# Patient Record
Sex: MALE | Race: WHITE | NOT HISPANIC OR LATINO | Employment: FULL TIME | URBAN - METROPOLITAN AREA
[De-identification: names, ages, dates, MRNs, and addresses within clinical notes are randomized per-mention and may not be internally consistent; named-entity substitution may affect disease eponyms.]

---

## 2017-06-06 ENCOUNTER — OFFICE VISIT (OUTPATIENT)
Dept: FAMILY MEDICINE | Facility: CLINIC | Age: 47
End: 2017-06-06
Payer: COMMERCIAL

## 2017-06-06 VITALS
WEIGHT: 187 LBS | SYSTOLIC BLOOD PRESSURE: 137 MMHG | DIASTOLIC BLOOD PRESSURE: 88 MMHG | HEART RATE: 64 BPM | BODY MASS INDEX: 27.7 KG/M2 | HEIGHT: 69 IN | TEMPERATURE: 99.7 F | OXYGEN SATURATION: 96 %

## 2017-06-06 DIAGNOSIS — Z00.01 ENCOUNTER FOR ROUTINE ADULT HEALTH EXAMINATION WITH ABNORMAL FINDINGS: Primary | ICD-10-CM

## 2017-06-06 DIAGNOSIS — Z13.1 SCREENING FOR DIABETES MELLITUS: ICD-10-CM

## 2017-06-06 DIAGNOSIS — Z13.6 CARDIOVASCULAR SCREENING; LDL GOAL LESS THAN 160: ICD-10-CM

## 2017-06-06 DIAGNOSIS — J01.90 ACUTE SINUSITIS WITH SYMPTOMS GREATER THAN 10 DAYS: ICD-10-CM

## 2017-06-06 LAB
CHOLEST SERPL-MCNC: 159 MG/DL
GLUCOSE SERPL-MCNC: 97 MG/DL (ref 70–99)
HDLC SERPL-MCNC: 45 MG/DL
LDLC SERPL CALC-MCNC: 61 MG/DL
NONHDLC SERPL-MCNC: 114 MG/DL
TRIGL SERPL-MCNC: 263 MG/DL

## 2017-06-06 PROCEDURE — 82947 ASSAY GLUCOSE BLOOD QUANT: CPT | Performed by: FAMILY MEDICINE

## 2017-06-06 PROCEDURE — 36415 COLL VENOUS BLD VENIPUNCTURE: CPT | Performed by: FAMILY MEDICINE

## 2017-06-06 PROCEDURE — 99213 OFFICE O/P EST LOW 20 MIN: CPT | Mod: 25 | Performed by: FAMILY MEDICINE

## 2017-06-06 PROCEDURE — 99386 PREV VISIT NEW AGE 40-64: CPT | Performed by: FAMILY MEDICINE

## 2017-06-06 PROCEDURE — 80061 LIPID PANEL: CPT | Performed by: FAMILY MEDICINE

## 2017-06-06 RX ORDER — AMOXICILLIN 500 MG/1
500 CAPSULE ORAL 3 TIMES DAILY
Qty: 30 CAPSULE | Refills: 0 | Status: SHIPPED | OUTPATIENT
Start: 2017-06-06 | End: 2017-06-16

## 2017-06-06 RX ORDER — AMOXICILLIN 500 MG/1
500 CAPSULE ORAL 3 TIMES DAILY
Qty: 30 CAPSULE | Refills: 0 | Status: SHIPPED | OUTPATIENT
Start: 2017-06-06 | End: 2017-06-06

## 2017-06-06 NOTE — PROGRESS NOTES
SUBJECTIVE:     CC: Pedrito Jenkins is an 46 year old male who presents for preventative health visit.     Healthy Habits:    Do you get at least three servings of calcium containing foods daily (dairy, green leafy vegetables, etc.)? yes    Amount of exercise or daily activities, outside of work: 4-5 day(s) per week    Problems taking medications regularly No    Medication side effects: No    Have you had an eye exam in the past two years? no    Do you see a dentist twice per year? yes    Do you have sleep apnea, excessive snoring or daytime drowsiness? yes      RESPIRATORY SYMPTOMS      Duration: 1 month    Description  cough    Severity: moderate    Accompanying signs and symptoms: headache and nausea    History (predisposing factors):  none    Precipitating or alleviating factors: None    Therapies tried and outcome:  Cough med otc, no improvement     Cough is dry. Started with cold symptoms about 1 month. After a week from onset, cold symptoms had resolved, however coughing had not. Also reports mild nausea with coughing.     For exercise, patient runs 20-25 miles a week.    Today's PHQ-2 Score:   PHQ-2 ( 1999 Pfizer) 6/6/2017 10/18/2013   Q1: Little interest or pleasure in doing things 0 0   Q2: Feeling down, depressed or hopeless 0 0   PHQ-2 Score 0 0       Abuse: Current or Past(Physical, Sexual or Emotional)- No  Do you feel safe in your environment - Yes    Social History   Substance Use Topics     Smoking status: Never Smoker     Smokeless tobacco: Never Used     Alcohol use 1.2 - 1.8 oz/week     2 - 3 Standard drinks or equivalent per week     The patient does not drink >3 drinks per day nor >7 drinks per week.    Last PSA: No results found for: PSA    No results for input(s): CHOL, HDL, LDL, TRIG, CHOLHDLRATIO, NHDL in the last 41450 hours.      ROS:  C: NEGATIVE for fever, chills, change in weight  I: NEGATIVE for worrisome rashes, moles or lesions  E: NEGATIVE for vision changes or irritation  ENT: See  "above.  R: NEGATIVE for significant SOB  CV: NEGATIVE for chest pain, palpitations or peripheral edema  GI: NEGATIVE for nausea, abdominal pain, heartburn, or change in bowel habits   male: negative for dysuria, hematuria, decreased urinary stream, erectile dysfunction, urethral discharge  M: NEGATIVE for significant arthralgias or myalgia  N: NEGATIVE for weakness, dizziness or paresthesias  P: NEGATIVE for changes in mood or affect    Problem list, Medication list, Allergies, and Medical/Social/Surgical histories reviewed in [facility] and updated as appropriate.    This document serves as a record of the services and decisions personally performed and made by Derrick Carter MD. It was created on his behalf by Radha Whitney, a trained medical scribe. The creation of this document is based the provider's statements to the medical scribe.    Scribe Radha Whitney 3:03 PM 6/6/2017    OBJECTIVE:     /88 (BP Location: Left arm, Patient Position: Chair, Cuff Size: Adult Regular)  Pulse 64  Temp 99.7  F (37.6  C) (Oral)  Ht 1.74 m (5' 8.5\")  Wt 84.8 kg (187 lb)  SpO2 96%  BMI 28.02 kg/m2  EXAM:  GENERAL: healthy, alert and no distress, coughing frequently.  EYES: Eyes grossly normal to inspection, PERRL and conjunctivae and sclerae normal  HENT: ear canals and TM's normal, nose and mouth without ulcers or lesions  NECK: no adenopathy, no asymmetry, masses, or scars and thyroid normal to palpation  RESP: lungs clear to auscultation - no rales, rhonchi or wheezes  CV: regular rate and rhythm, normal S1 S2, no S3 or S4, no murmur, click or rub, no peripheral edema and peripheral pulses strong  ABDOMEN: soft, nontender, no hepatosplenomegaly, no masses and bowel sounds normal   (male): normal male genitalia without lesions or urethral discharge, no hernia  MS: no gross musculoskeletal defects noted, no edema  SKIN: no suspicious lesions or rashes  NEURO: Normal strength and tone, mentation intact and speech " "normal  PSYCH: mentation appears normal, affect normal/bright    ASSESSMENT/PLAN:     (Z00.01) Encounter for routine adult health examination with abnormal findings  (primary encounter diagnosis)  Comment: Negative screening exam; up-to-date on preventive services.   Plan: GLUCOSE, Lipid panel reflex to direct LDL          (J01.90) Acute sinusitis with symptoms greater than 10 days  Comment: Based on history  Plan: amoxicillin (AMOXIL) 500 MG capsule,         Follow if symptoms fail to resolve within 14 days.    (Z13.6) CARDIOVASCULAR SCREENING; LDL GOAL LESS THAN 160  Comment: Non-fasting  Plan: Lipid panel reflex to direct LDL        Follow up as needed     (Z13.1) Screening for diabetes mellitus  Comment:   Plan: GLUCOSE              COUNSELING:  Reviewed preventive health counseling, as reflected in patient instructions       reports that he has never smoked. He has never used smokeless tobacco.    Estimated body mass index is 28.02 kg/(m^2) as calculated from the following:    Height as of this encounter: 1.74 m (5' 8.5\").    Weight as of this encounter: 84.8 kg (187 lb).   Weight management plan: Exercise routine reviewed and approved (runner).    Counseling Resources:  ATP IV Guidelines  Pooled Cohorts Equation Calculator  FRAX Risk Assessment  ICSI Preventive Guidelines  Dietary Guidelines for Americans, 2010  USDA's MyPlate  ASA Prophylaxis  Lung CA Screening    The information in this document, created by a scribe for me, accurately reflects the services I personally performed and the decisions made by me. I have reviewed and approved this document for accuracy.      Derrick Carter MD  [facility]  "

## 2017-06-06 NOTE — NURSING NOTE
".  Chief Complaint   Patient presents with     Physical       Initial /88 (BP Location: Left arm, Patient Position: Chair, Cuff Size: Adult Regular)  Pulse 64  Temp 99.7  F (37.6  C) (Oral)  Ht 5' 8.5\" (1.74 m)  Wt 187 lb (84.8 kg)  SpO2 96%  BMI 28.02 kg/m2 Estimated body mass index is 28.02 kg/(m^2) as calculated from the following:    Height as of this encounter: 5' 8.5\" (1.74 m).    Weight as of this encounter: 187 lb (84.8 kg).  Medication Reconciliation: complete     Kris Garibay MA      "

## 2017-06-06 NOTE — PATIENT INSTRUCTIONS
Preventive Health Recommendations  Male Ages 40 to 49    Yearly exam:             See your health care provider every year in order to  o   Review health changes.   o   Discuss preventive care.    o   Review your medicines if your doctor has prescribed any.    You should be tested each year for STDs (sexually transmitted diseases) if you re at risk.     Have a cholesterol test every 5 years.     Have a colonoscopy (test for colon cancer) if someone in your family has had colon cancer or polyps before age 50.     After age 45, have a diabetes test (fasting glucose). If you are at risk for diabetes, you should have this test every 3 years.      Talk with your health care provider about whether or not a prostate cancer screening test (PSA) is right for you.    Shots: Get a flu shot each year. Get a tetanus shot every 10 years.     Nutrition:    Eat at least 5 servings of fruits and vegetables daily.     Eat whole-grain bread, whole-wheat pasta and brown rice instead of white grains and rice.     Talk to your provider about Calcium and Vitamin D.     Lifestyle    Exercise for at least 150 minutes a week (30 minutes a day, 5 days a week). This will help you control your weight and prevent disease.     Limit alcohol to one drink per day.     No smoking.     Wear sunscreen to prevent skin cancer.     See your dentist every six months for an exam and cleaning.      This summary includes the important diagnoses, test, medications and other important parts of your medical history.  Below are a few good we sites you can use to learn more about these.     Www.comment.com.org : Up to date and easily searchable information on multiple topics.  Www.comment.com.org/Pharmacy/c_539084.asp : [facility] $4.99 medications  Www.medlineplus.gov : medication info, interactive tutorials, watch real surgeries online  Www.familydoctor.org : good info from the Academy of Family Physicians  Www.mayoclinic.com : good info from the [facility]  Www.cdc.gov : public health info, travel advisories, epidemics (H1N1)  Www.aap.org : children's health info, normal development, vaccinations  Www.health.[facility].[place].us : [place] dept of heat, public health issues in [place], N1N1    Based on your medical history and these are the current health maintenance or preventive care services that you are due for (some may have been done at this visit:)  Health Maintenance Due   Topic Date Due     LIPID SCREEN Q5 YR MALE (SYSTEM ASSIGNED)  10/24/2013     =================================================================================  Normal Values   Blood pressure  <140/90 for most adults    <130/80 for some chronic diseases (ask your care team about yours)    BMI (body mass index)  18.5-25 kg/m2 (based on height and weight)     Thank you for visiting [facility]    Normal or non-critical lab and imaging results will be communicated to you by MyChart, letter or phone within 7 days.  If you do not hear from us within 10 days, please call the clinic. If you have a critical or abnormal lab result, we will notify you by phone as soon as possible.     If you have any questions regarding your visit please contact:     Team Comfort:   Clinic Hours Telephone Number   Dr. Derrick Moralez   [facility]   7am-5pm  Monday - Friday [phone]  [facility]   Pharmacy 8am-8pm Monday-Thursday      8am-6pm Friday  9am-5pm Saturday-Sunday [phone]   Urgent Care 11am-8pm Monday-Friday        9am-5pm Saturday-Sunday [phone]     After hours, weekend or if you need to make an appointment with your primary provider please call [phone].   After Hours nurse advise: call [facility]: [phone]    Medication Refills:  Call your pharmacy and they will forward the refill to us. Please allow 3 business days for your refills to be completed.    Use Sendoid (secure email communication and  access to your chart) to send your primary care provider a message or make an appointment. Ask someone on your Team how to sign up for Keepy. To log on to [employer] or for more information in NanoInk please visit the website at [email or web address].  As of October 8, 2013, all password changes, disabled accounts, or ID changes in Keepy/MyHealth will be done by our Access Services Department.   If you need help with your account or password, call: [phone]. Clinic staff no longer has the ability to change passwords.

## 2017-06-06 NOTE — MR AVS SNAPSHOT
After Visit Summary   6/6/2017    Pedrito Jenkins    MRN: [id number]           Patient Information     Date Of Birth          1970        Visit Information        Provider Department      6/6/2017 2:00 PM Derrick Carter MD [facility]        Today's Diagnoses     Encounter for routine adult health examination with abnormal findings    -  1    Acute sinusitis with symptoms greater than 10 days        CARDIOVASCULAR SCREENING; LDL GOAL LESS THAN 160        Screening for diabetes mellitus          Care Instructions      Preventive Health Recommendations  Male Ages 40 to 49    Yearly exam:             See your health care provider every year in order to  o   Review health changes.   o   Discuss preventive care.    o   Review your medicines if your doctor has prescribed any.    You should be tested each year for STDs (sexually transmitted diseases) if you re at risk.     Have a cholesterol test every 5 years.     Have a colonoscopy (test for colon cancer) if someone in your family has had colon cancer or polyps before age 50.     After age 45, have a diabetes test (fasting glucose). If you are at risk for diabetes, you should have this test every 3 years.      Talk with your health care provider about whether or not a prostate cancer screening test (PSA) is right for you.    Shots: Get a flu shot each year. Get a tetanus shot every 10 years.     Nutrition:    Eat at least 5 servings of fruits and vegetables daily.     Eat whole-grain bread, whole-wheat pasta and brown rice instead of white grains and rice.     Talk to your provider about Calcium and Vitamin D.     Lifestyle    Exercise for at least 150 minutes a week (30 minutes a day, 5 days a week). This will help you control your weight and prevent disease.     Limit alcohol to one drink per day.     No smoking.     Wear sunscreen to prevent skin cancer.     See your dentist every six months for an exam and cleaning.      This  summary includes the important diagnoses, test, medications and other important parts of your medical history.  Below are a few good we sites you can use to learn more about these.     Www.BonitaSoft.org : Up to date and easily searchable information on multiple topics.  Www.BonitaSoft.org/Pharmacy/c_539084.asp : [facility] $4.99 medications  Www.ZoomForth.gov : medication info, interactive tutorials, watch real surgeries online  Www.familydoctor.org : good info from the Academy of Family Physicians  Www.mayoAginovainic.com : good info from the [facility]  Www.cdc.gov : public health info, travel advisories, epidemics (H1N1)  Www.aap.org : children's health info, normal development, vaccinations  Www.health.[facility].[place].us : [place] dept of heatl, public health issues in [place], N1N1    Based on your medical history and these are the current health maintenance or preventive care services that you are due for (some may have been done at this visit:)  Health Maintenance Due   Topic Date Due     LIPID SCREEN Q5 YR MALE (SYSTEM ASSIGNED)  10/24/2013     =================================================================================  Normal Values   Blood pressure  <140/90 for most adults    <130/80 for some chronic diseases (ask your care team about yours)    BMI (body mass index)  18.5-25 kg/m2 (based on height and weight)     Thank you for visiting [facility]    Normal or non-critical lab and imaging results will be communicated to you by MyChart, letter or phone within 7 days.  If you do not hear from us within 10 days, please call the clinic. If you have a critical or abnormal lab result, we will notify you by phone as soon as possible.     If you have any questions regarding your visit please contact:     Team Comfort:   Clinic Hours Telephone Number   Dr. Derrick Turner   7am-5pm  Monday - Friday [phone]  [facility]    Pharmacy 8am-8pm Monday-Thursday      8am-6pm Friday  9am-5pm Saturday-Sunday [phone]   Urgent Care 11am-8pm Monday-Friday        9am-5pm Saturday-Sunday [phone]     After hours, weekend or if you need to make an appointment with your primary provider please call [phone].   After Hours nurse advise: call [facility]: [phone]    Medication Refills:  Call your pharmacy and they will forward the refill to us. Please allow 3 business days for your refills to be completed.    Use iCrumz (secure email communication and access to your chart) to send your primary care provider a message or make an appointment. Ask someone on your Team how to sign up for iCrumz. To log on to [employer] or for more information in [employer] please visit the website at [email or web address].  As of October 8, 2013, all password changes, disabled accounts, or ID changes in iCrumz/MyHealth will be done by our Access Services Department.   If you need help with your account or password, call: [phone]. Clinic staff no longer has the ability to change passwords.             Follow-ups after your visit        Follow-up notes from your care team     Return if symptoms worsen or fail to resolve by 6/20/2017.      Your next 10 appointments already scheduled     Jun 29, 2017  4:40 PM CDT   PHYSICAL with Derrick Carter MD   [facility]    [address] [place] [id number] [phone]              Who to contact     If you have questions or need follow up information about today's clinic visit or your schedule please contact [facility] directly at [phone].  Normal or non-critical lab and imaging results will be communicated to you by MyChart, letter or phone within 4 business days after the clinic has received the results. If you do not hear from us within 7 days, please contact the clinic through iCrumz or  "phone. If you have a critical or abnormal lab result, we will notify you by phone as soon as possible.  Submit refill requests through InnerRewards or call your pharmacy and they will forward the refill request to us. Please allow 3 business days for your refill to be completed.          Additional Information About Your Visit        [employer] Information     InnerRewards lets you send messages to your doctor, view your test results, renew your prescriptions, schedule appointments and more. To sign up, go to [email or web address] . Click on \"Log in\" on the left side of the screen, which will take you to the Welcome page. Then click on \"Sign up Now\" on the right side of the page.     You will be asked to enter the access code listed below, as well as some personal information. Please follow the directions to create your username and password.     Your access code is: [email or web address]  Expires: 2017  2:54 PM     Your access code will  in 90 days. If you need help or a new code, please call your [facility] clinic or [phone].        Care EveryWhere ID     This is your Care EveryWhere ID. This could be used by other organizations to access your [facility] medical records  [id number]        Your Vitals Were     Pulse Temperature Height Pulse Oximetry BMI (Body Mass Index)       64 99.7  F (37.6  C) (Oral) 5' 8.5\" (1.74 m) 96% 28.02 kg/m2        Blood Pressure from Last 3 Encounters:   17 137/88   10/18/13 136/86   12 124/77    Weight from Last 3 Encounters:   17 187 lb (84.8 kg)   10/18/13 182 lb 4 oz (82.7 kg)   12 180 lb (81.6 kg)              We Performed the Following     GLUCOSE     Lipid panel reflex to direct LDL          Today's Medication Changes          These changes are accurate as of: 17  2:55 PM.  If you have any questions, ask your nurse or doctor.               Start taking these medicines.        Dose/Directions    amoxicillin 500 MG capsule   Commonly known as:  AMOXIL   Used " for:  Acute sinusitis with symptoms greater than 10 days   Started by:  Derrick Carter MD        Dose:  500 mg   Take 1 capsule (500 mg) by mouth 3 times daily for 10 days for sinus infection   Quantity:  30 capsule   Refills:  0            Where to get your medicines      These medications were sent to [facility] [place], [place] - [address]  [address], [facility] [address]     Phone:  [phone]     amoxicillin 500 MG capsule                Primary Care Provider Office Phone # Fax #    Angela Porshamadiha Jean-Baptiste [phone]       [facility] [address]  [facility] [address]        Thank you!     Thank you for choosing [facility]  for your care. Our goal is always to provide you with excellent care. Hearing back from our patients is one way we can continue to improve our services. Please take a few minutes to complete the written survey that you may receive in the mail after your visit with us. Thank you!             Your Updated Medication List - Protect others around you: Learn how to safely use, store and throw away your medicines at www.disposemymeds.org.          This list is accurate as of: 6/6/17  2:55 PM.  Always use your most recent med list.                   Brand Name Dispense Instructions for use    amoxicillin 500 MG capsule    AMOXIL    30 capsule    Take 1 capsule (500 mg) by mouth 3 times daily for 10 days for sinus infection

## 2023-01-27 ENCOUNTER — TELEPHONE (OUTPATIENT)
Dept: FAMILY MEDICINE | Facility: CLINIC | Age: 53
End: 2023-01-27

## 2023-01-27 ENCOUNTER — OFFICE VISIT (OUTPATIENT)
Dept: FAMILY MEDICINE | Facility: CLINIC | Age: 53
End: 2023-01-27
Payer: COMMERCIAL

## 2023-01-27 VITALS
HEIGHT: 69 IN | SYSTOLIC BLOOD PRESSURE: 132 MMHG | BODY MASS INDEX: 28.88 KG/M2 | DIASTOLIC BLOOD PRESSURE: 74 MMHG | TEMPERATURE: 98.9 F | OXYGEN SATURATION: 97 % | HEART RATE: 59 BPM | RESPIRATION RATE: 16 BRPM | WEIGHT: 195 LBS

## 2023-01-27 DIAGNOSIS — Z12.11 SCREEN FOR COLON CANCER: ICD-10-CM

## 2023-01-27 DIAGNOSIS — Z28.21 INFLUENZA VACCINATION DECLINED: ICD-10-CM

## 2023-01-27 DIAGNOSIS — Z12.5 SCREENING FOR PROSTATE CANCER: ICD-10-CM

## 2023-01-27 DIAGNOSIS — H61.23 BILATERAL IMPACTED CERUMEN: ICD-10-CM

## 2023-01-27 DIAGNOSIS — R35.0 URINARY FREQUENCY: Primary | ICD-10-CM

## 2023-01-27 DIAGNOSIS — Z00.00 ROUTINE GENERAL MEDICAL EXAMINATION AT A HEALTH CARE FACILITY: Primary | ICD-10-CM

## 2023-01-27 DIAGNOSIS — Z28.21 HERPES ZOSTER VACCINATION DECLINED: ICD-10-CM

## 2023-01-27 DIAGNOSIS — Z13.1 SCREENING FOR DIABETES MELLITUS: ICD-10-CM

## 2023-01-27 DIAGNOSIS — R35.0 URINARY FREQUENCY: ICD-10-CM

## 2023-01-27 DIAGNOSIS — E66.3 OVERWEIGHT (BMI 25.0-29.9): ICD-10-CM

## 2023-01-27 DIAGNOSIS — Z28.21 COVID-19 VACCINATION DECLINED: ICD-10-CM

## 2023-01-27 DIAGNOSIS — Z28.21 TETANUS, DIPHTHERIA, AND ACELLULAR PERTUSSIS (TDAP) VACCINATION DECLINED: ICD-10-CM

## 2023-01-27 DIAGNOSIS — Z13.220 SCREENING FOR HYPERLIPIDEMIA: ICD-10-CM

## 2023-01-27 LAB
ALBUMIN UR-MCNC: NEGATIVE MG/DL
ANION GAP SERPL CALCULATED.3IONS-SCNC: 7 MMOL/L (ref 3–14)
APPEARANCE UR: CLEAR
BACTERIA #/AREA URNS HPF: ABNORMAL /HPF
BILIRUB UR QL STRIP: NEGATIVE
BUN SERPL-MCNC: 18 MG/DL (ref 7–30)
CALCIUM SERPL-MCNC: 9.4 MG/DL (ref 8.5–10.1)
CHLORIDE BLD-SCNC: 107 MMOL/L (ref 94–109)
CHOLEST SERPL-MCNC: 194 MG/DL
CO2 SERPL-SCNC: 25 MMOL/L (ref 20–32)
COLOR UR AUTO: YELLOW
CREAT SERPL-MCNC: 0.96 MG/DL (ref 0.66–1.25)
FASTING STATUS PATIENT QL REPORTED: YES
GFR SERPL CREATININE-BSD FRML MDRD: >90 ML/MIN/1.73M2
GLUCOSE BLD-MCNC: 105 MG/DL (ref 70–99)
GLUCOSE UR STRIP-MCNC: NEGATIVE MG/DL
HDLC SERPL-MCNC: 51 MG/DL
HGB UR QL STRIP: NEGATIVE
KETONES UR STRIP-MCNC: NEGATIVE MG/DL
LDLC SERPL CALC-MCNC: 120 MG/DL
LEUKOCYTE ESTERASE UR QL STRIP: NEGATIVE
MUCOUS THREADS #/AREA URNS LPF: PRESENT /LPF
NITRATE UR QL: NEGATIVE
NONHDLC SERPL-MCNC: 143 MG/DL
PH UR STRIP: 6 [PH] (ref 5–7)
POTASSIUM BLD-SCNC: 4.2 MMOL/L (ref 3.4–5.3)
PSA SERPL-MCNC: 0.75 UG/L (ref 0–4)
RBC #/AREA URNS AUTO: ABNORMAL /HPF
SODIUM SERPL-SCNC: 139 MMOL/L (ref 133–144)
SP GR UR STRIP: 1.02 (ref 1–1.03)
SQUAMOUS #/AREA URNS AUTO: ABNORMAL /LPF
TRIGL SERPL-MCNC: 113 MG/DL
UROBILINOGEN UR STRIP-ACNC: 0.2 E.U./DL
WBC #/AREA URNS AUTO: ABNORMAL /HPF

## 2023-01-27 PROCEDURE — 81001 URINALYSIS AUTO W/SCOPE: CPT | Performed by: FAMILY MEDICINE

## 2023-01-27 PROCEDURE — 69209 REMOVE IMPACTED EAR WAX UNI: CPT | Mod: 50 | Performed by: FAMILY MEDICINE

## 2023-01-27 PROCEDURE — 99386 PREV VISIT NEW AGE 40-64: CPT | Mod: 25 | Performed by: FAMILY MEDICINE

## 2023-01-27 PROCEDURE — G0103 PSA SCREENING: HCPCS | Performed by: FAMILY MEDICINE

## 2023-01-27 PROCEDURE — 87086 URINE CULTURE/COLONY COUNT: CPT | Performed by: FAMILY MEDICINE

## 2023-01-27 PROCEDURE — 36415 COLL VENOUS BLD VENIPUNCTURE: CPT | Performed by: FAMILY MEDICINE

## 2023-01-27 PROCEDURE — 99213 OFFICE O/P EST LOW 20 MIN: CPT | Mod: 25 | Performed by: FAMILY MEDICINE

## 2023-01-27 PROCEDURE — 80061 LIPID PANEL: CPT | Performed by: FAMILY MEDICINE

## 2023-01-27 PROCEDURE — 80048 BASIC METABOLIC PNL TOTAL CA: CPT | Performed by: FAMILY MEDICINE

## 2023-01-27 ASSESSMENT — ENCOUNTER SYMPTOMS
NAUSEA: 0
PALPITATIONS: 0
CONSTIPATION: 0
FEVER: 0
FREQUENCY: 0
ABDOMINAL PAIN: 0
EYE PAIN: 0
NERVOUS/ANXIOUS: 0
HEMATOCHEZIA: 0
WEAKNESS: 0
DIZZINESS: 0
HEMATURIA: 0
CHILLS: 0
DYSURIA: 0
HEADACHES: 0
JOINT SWELLING: 0
HEARTBURN: 0
MYALGIAS: 0
SHORTNESS OF BREATH: 0
PARESTHESIAS: 0
ARTHRALGIAS: 0
DIARRHEA: 0
SORE THROAT: 0
COUGH: 0

## 2023-01-27 ASSESSMENT — PAIN SCALES - GENERAL: PAINLEVEL: NO PAIN (0)

## 2023-01-27 NOTE — RESULT ENCOUNTER NOTE
"Please inform of results if patient has not viewed in [email or web address] within 3 business days.    PSA - Your Prostate cancer screening lab results were normal.    Lipids - Your \"bad\" cholesterol was elevated. This can be improved with diet and exercise. All animal based foods such as meat, dairy, and eggs contain cholesterol. All plant based foods such as fruits, nuts, and vegetables do not.    You do not need a medication for this at this time.    BMP - Your blood sugar was 105. Your kidney function and electrolytes were normal.    Please call the clinic with any questions you may have.     Have a great day,    Dr. Saucedo    The 10-year ASCVD risk score (Stephy CABALLERO, et al., 2019) is: 4.2%    Values used to calculate the score:      Age: 52 years      Sex: Male      Is Non- : No      Diabetic: No      Tobacco smoker: No      Systolic Blood Pressure: 132 mmHg      Is BP treated: No      HDL Cholesterol: 51 mg/dL      Total Cholesterol: 194 mg/dL"

## 2023-01-27 NOTE — TELEPHONE ENCOUNTER
----- Message from Loulou Reid sent at 1/27/2023  9:46 AM CST -----    ----- Message -----  From: Migue Martinez MD  Sent: 1/27/2023   9:37 AM CST  To: [facility] Patient Care Team    Please inform of results if patient has not viewed in scPharmaceuticalst within 3 business days.    There were a few bacteria present on your urine, test. However, no other signs of infection. I will send this for a culture to determine if this could be a actual UTI or a contaminant from your skin.    Please call the clinic with any questions you may have.     Have a great day,    Dr. Saucedo

## 2023-01-27 NOTE — PROGRESS NOTES
SUBJECTIVE:   CC: Pedrito is an 52 year old who presents for preventative health visit.     Patient has been advised of split billing requirements and indicates understanding: Yes  Healthy Habits:     Getting at least 3 servings of Calcium per day:  Yes    Bi-annual eye exam:  NO    Dental care twice a year:  Yes    Sleep apnea or symptoms of sleep apnea:  None    Diet:  Regular (no restrictions)    Frequency of exercise:  2-3 days/week    Duration of exercise:  15-30 minutes    Taking medications regularly:  Yes    Medication side effects:  Not applicable    PHQ-2 Total Score: 0    Additional concerns today:  No    Having some frequency at night and during the day. Has to start planning around meetings etc. No dysuria, hematuria, abdominal pain, etc.    No personal or family history of prostate cancers.    Today's PHQ-2 Score:   PHQ-2 ( 1999 [employer]) 1/27/2023   Q1: Little interest or pleasure in doing things 0   Q2: Feeling down, depressed or hopeless 0   PHQ-2 Score 0   Q1: Little interest or pleasure in doing things Not at all   Q2: Feeling down, depressed or hopeless Not at all   PHQ-2 Score 0     Have you ever done Advance Care Planning? (For example, a Health Directive, POLST, or a discussion with a medical provider or your loved ones about your wishes): No, advance care planning information given to patient to review.  Advanced care planning was discussed at today's visit.    Social History     Tobacco Use     Smoking status: Never     Smokeless tobacco: Never   Substance Use Topics     Alcohol use: Yes     Alcohol/week: 2.0 - 3.0 standard drinks     Types: 2 - 3 Standard drinks or equivalent per week     Alcohol Use 1/27/2023   Prescreen: >3 drinks/day or >7 drinks/week? No   Prescreen: >3 drinks/day or >7 drinks/week? -     Last PSA: No results found for: PSA    Reviewed orders with patient. Reviewed health maintenance and updated orders accordingly - Yes  Lab work is in process  BP Readings from Last 3  Encounters:   01/27/23 132/74   06/06/17 137/88   10/18/13 136/86    Wt Readings from Last 3 Encounters:   01/27/23 88.5 kg (195 lb)   06/06/17 84.8 kg (187 lb)   10/18/13 82.7 kg (182 lb 4 oz)          Patient Active Problem List   Diagnosis     CARDIOVASCULAR SCREENING; LDL GOAL LESS THAN 160     Past Surgical History:   Procedure Laterality Date     VASECTOMY      Dr. Narayanan       Social History     Tobacco Use     Smoking status: Never     Smokeless tobacco: Never   Substance Use Topics     Alcohol use: Yes     Alcohol/week: 2.0 - 3.0 standard drinks     Types: 2 - 3 Standard drinks or equivalent per week     Family History   Problem Relation Age of Onset     Parkinsonism Father      Hypertension No family hx of      Coronary Artery Disease No family hx of      Cerebrovascular Disease No family hx of      Diabetes No family hx of      Cancer No family hx of          No current outpatient medications on file.     No Known Allergies    Reviewed and updated as needed this visit by clinical staff   Tobacco  Allergies  Meds  Problems  Med Hx  Surg Hx  Fam Hx          Reviewed and updated as needed this visit by Provider   Tobacco  Allergies  Meds  Problems  Med Hx  Surg Hx  Fam Hx       Social history reviewed  Past Medical History:   Diagnosis Date     NO ACTIVE PROBLEMS       Past Surgical History:   Procedure Laterality Date     VASECTOMY      Dr. Narayanan       Review of Systems   Constitutional: Negative for chills and fever.   HENT: Negative for congestion, ear pain, hearing loss and sore throat.    Eyes: Negative for pain and visual disturbance.   Respiratory: Negative for cough and shortness of breath.    Cardiovascular: Negative for chest pain, palpitations and peripheral edema.   Gastrointestinal: Negative for abdominal pain, constipation, diarrhea, heartburn, hematochezia and nausea.   Genitourinary: Positive for urgency. Negative for dysuria, frequency, genital sores, hematuria, impotence  "and penile discharge.   Musculoskeletal: Negative for arthralgias, joint swelling and myalgias.   Skin: Negative for rash.   Neurological: Negative for dizziness, weakness, headaches and paresthesias.   Psychiatric/Behavioral: Negative for mood changes. The patient is not nervous/anxious.      OBJECTIVE:   /74   Pulse 59   Temp 98.9  F (37.2  C) (Temporal)   Resp 16   Ht 1.744 m (5' 8.66\")   Wt 88.5 kg (195 lb)   SpO2 97%   BMI 29.08 kg/m      Physical Exam  GENERAL: healthy, alert and no distress  EYES: Eyes grossly normal to inspection, PERRL and conjunctivae and sclerae normal  HENT: ear canals and TM's normal, nose and mouth without ulcers or lesions  NECK: no adenopathy, no asymmetry, masses, or scars and thyroid normal to palpation  RESP: lungs clear to auscultation - no rales, rhonchi or wheezes  CV: regular rate and rhythm, normal S1 S2, no S3 or S4, no murmur, click or rub, no peripheral edema and peripheral pulses strong  ABDOMEN: soft, nontender, no hepatosplenomegaly, no masses and bowel sounds normal  MS: no gross musculoskeletal defects noted, no edema  SKIN: no suspicious lesions or rashes  NEURO: Normal strength and tone, mentation intact and speech normal  PSYCH: mentation appears normal, affect normal/bright    Labs: pending    ASSESSMENT/PLAN:   1. Routine general medical examination at a health care facility: Discussed personal health and safety. Routine screenings as below. Appropriate anticipatory guidance, vaccinations, and health screening recommendations delivered according to the USPSTF and other appropriate society guidelines.  Patient understands and is agreeable with the plan ordered below.  - REVIEW OF HEALTH MAINTENANCE PROTOCOL ORDERS  - Colonoscopy Screening  Referral; Future  - PRIMARY CARE FOLLOW-UP SCHEDULING; Future  - Lipid panel reflex to direct LDL Fasting; Future  - Basic metabolic panel; Future  - PSA, screen; Future    2. Screening for " hyperlipidemia  - Lipid panel reflex to direct LDL Fasting; Future    3. Screening for diabetes mellitus  - Basic metabolic panel; Future    4. Screen for colon cancer  - Colonoscopy Screening  Referral; Future    5. Overweight (BMI 25.0-29.9): Encourage diet and exercise changes for overall health improvement.    6. Urinary frequency: Work up as below. Not interested in medication at this time.  - UA with Microscopic reflex to Culture - lab collect; Future  - PSA, screen; Future    7. Screening for prostate cancer: Patient elects to undergo PSA screening after informed decision making process. This discussion with the patient included reviewing the benefits of testing such as: Ability to easily add on to existing blood work, screening for a common cancer in men which has treatment options and otherwise may have been silent, and possibility for early detection to prevent morbidity from future metastasis and/or death. Additionally, risks were discussed including possibility of false positive testing (leading to anxiety and further unnecessary testing/biopsies), possibility of over treating a cancer that may not affect a man in his lifetime, and the side effects of the current treatment options for prosate cancer (urinary incontinence, ED, etc).  - PSA, screen; Future    8. Bilateral impacted cerumen: Noted on exam and flushed by MA staff.  - AR REMOVAL IMPACTED CERUMEN IRRIGATION/LVG UNILAT    9. Tetanus, diphtheria, and acellular pertussis (Tdap) vaccination declined    10. Influenza vaccination declined    11. COVID-19 vaccination declined    12. Herpes zoster vaccination declined    COUNSELING:   Reviewed preventive health counseling, as reflected in patient instructions       Regular exercise       Healthy diet/nutrition       Vision screening       Hearing screening       Colorectal cancer screening       Prostate cancer screening    He reports that he has never smoked. He has never used smokeless  tobacco.    Migue Martinez MD  [facility]    Disclaimer: This note consists of symbols derived from keyboarding, dictation and/or voice recognition software. As a result, there may be errors in the script that have gone undetected. Please consider this when interpreting information found in this chart.

## 2023-01-28 LAB — BACTERIA UR CULT: NO GROWTH

## 2023-01-30 NOTE — RESULT ENCOUNTER NOTE
Please inform of results if patient has not viewed in [facility] within 3 business days.    Your urine culture was normal without signs of infection.    Please call the clinic with any questions you may have.     Have a great day,    Dr. Saucedo

## 2023-02-09 ENCOUNTER — TELEPHONE (OUTPATIENT)
Dept: GASTROENTEROLOGY | Facility: CLINIC | Age: 53
End: 2023-02-09
Payer: COMMERCIAL

## 2023-02-09 NOTE — TELEPHONE ENCOUNTER
Screening Questions  BLUE  KIND OF PREP RED  LOCATION [review exclusion criteria] GREEN  SEDATION TYPE        Y Are you active on mychart?       Migue Martinez MD    Ordering/Referring Provider?        [facility] What type of coverage do you have?      N Have you had a positive covid test in the last 14 days?     28.1 1. BMI  [BMI 40+ - review exclusion criteria]    Y  2. Are you able to give consent for your medical care? [IF NO,RN REVIEW]          N  3. Are you taking any prescription pain medications on a routine schedule   (ex narcotics: oxycodone, roxicodone, oxycontin,  and percocet)? [RN Review]          3a. EXTENDED PREP What kind of prescription?     N 4. Do you have any chemical dependencies such as alcohol, street drugs, or methadone?        **If yes 3- 5 , please schedule with MAC sedation.**          IF YES TO ANY 6 - 10 - HOSPITAL SETTING ONLY.     N 6.   Do you need assistance transferring?     N 7.   Have you had a heart or lung transplant?    N 8.   Are you currently on dialysis?   N 9.   Do you use daily home oxygen?   N 10. Do you take nitroglycerin?   10a.  If yes, how often?     11. [FEMALES]  N/A Are you currently pregnant?    11a.  If yes, how many weeks? [ Greater than 12 weeks, OR NEEDED]    N 12. Do you have Pulmonary Hypertension? *NEED PAC APPT AT [facility] w/ [facility]*     N 13. [review exclusion criteria]  Do you have any implantable devices in your body (pacemaker, defib, LVAD)?    N 14. In the past 6 months, have you had any heart related issues including cardiomyopathy or heart attack?     14a.  If yes, did it require cardiac stenting if so when?     N 15. Have you had a stroke or Transient ischemic attack (TIA - aka  mini stroke ) within 6 months?      N 16. Do you have mod to severe Obstructive Sleep Apnea?  [Hospital only]    N 17. Do you have SEVERE AND UNCONTROLLED asthma? *NEED PAC APPT AT [facility] w/MAC*     N 18. Are you currently taking any blood thinners?     18a. If yes, inform  "patient to \"follow up w/ ordering provider for bridging instructions.\"    N 19. Do you take the medication Phentermine?    19a. If yes, \"Hold for 7 days before procedure.  Please consult your prescribing provider if you have questions about holding this medication.\"     N  20. Do you have chronic kidney disease?      N  21. Do you have a diagnosis of diabetes?     N  22. On a regular basis do you go 3-5 days between bowel movements?      23. Preferred [facility] for Pre Prescription    [ LIST ONLY ONE PHARMACY]        [id number] - [place], [place] - [id number]      - CLOSING REMINDERS -    Informed patient they will need an adult    Cannot take any type of public or medical transportation alone    Conscious Sedation- Needs  for 6 hours after the procedure       MAC/General-Needs  for 24 hours after procedure    Pre-Procedure Covid test to be completed [[facility] PCR Testing Required]    Confirmed Nurse will call to complete assessment       - SCHEDULING DETAILS -  NO Hospital Setting Required? If yes, what is the exclusion?:    BILLIE  Surgeon    4/6  Date of Procedure  Lower Endoscopy [Colonoscopy]  Type of Procedure Scheduled  [facility]   STANDARD [facility]-If you answer yes to questions #8, #20, #21Which Colonoscopy Prep was Sent?     MODERATE Sedation Type     N PAC / Pre-op Required                 "

## 2023-02-27 ENCOUNTER — TELEPHONE (OUTPATIENT)
Dept: GASTROENTEROLOGY | Facility: CLINIC | Age: 53
End: 2023-02-27
Payer: COMMERCIAL

## 2023-02-27 NOTE — TELEPHONE ENCOUNTER
Caller: Writer to patient   Reason for Reschedule/Cancellation (please be detailed, any staff messages or encounters to note?): [facility] out      Prior to reschedule please review:    Ordering Provider:REBECCA MAHONEY    Sedation per order: Moderate    Does patient have any ASC Exclusions, please identify?: No      Notes on Cancelled Procedure:    Procedure:Lower Endoscopy [Colonoscopy]     Date: 4/6/2023    Location:[facility]; [address], [place] [address]    Surgeon: Aundrea        Rescheduled: Yes    Procedure: Lower Endoscopy [Colonoscopy]     Date: 4/18/2023    Location:[facility]; [address], [place] [address]    Surgeon: Kelsey    Sedation Level Scheduled  Moderate,  Reason for Sedation Level Order    Prep/Instructions updated and sent: Yes, mailed.

## 2023-04-05 ENCOUNTER — TELEPHONE (OUTPATIENT)
Dept: GASTROENTEROLOGY | Facility: CLINIC | Age: 53
End: 2023-04-05
Payer: COMMERCIAL

## 2023-04-05 DIAGNOSIS — Z12.11 ENCOUNTER FOR SCREENING COLONOSCOPY: Primary | ICD-10-CM

## 2023-04-05 RX ORDER — BISACODYL 5 MG/1
TABLET, DELAYED RELEASE ORAL
Qty: 4 TABLET | Refills: 0 | Status: SHIPPED | OUTPATIENT
Start: 2023-04-05 | End: 2024-01-29

## 2023-04-05 NOTE — TELEPHONE ENCOUNTER
Attempted to contact patient regarding upcoming Colonoscopy  procedure on 4/18/23 for pre assessment questions. No answer.     Left message to return call to [phone] #4    Discuss Covid policy and designated  policy.    Pre op exam? N/A    Arrival time: 0930. Procedure time: 1015    Facility location: [facility]; [address], [place] [address]    Sedation type: Conscious sedation     Anticoagulants: No    Electronic implanted devices? No    Diabetic? No    Indication for procedure: screening colonoscopy    Bowel prep recommendation: Standard Golytely  d/t mg citrate recall    Prep instructions sent via letter at time of scheduling. Writer will resend.   Bowel prep script sent to    [id number] - [place], [place] - [id number], RN  Endoscopy Procedure Pre Assessment RN

## 2023-04-05 NOTE — LETTER
April 5, 2023      Pedrito Jenkins  [address] [place] [address]              Dear Pedrito,          Colonoscopy      Procedure date: 4/18/23    Anticipated arrival time: 9:30 AM   (Procedure Times are Subject to Change)    Facility location: [facility]; [address], [place] [address] - Check in location: 2nd Floor at Surgery desk    Prep Instructions: Instructions on how to prepare for your upcoming procedure are found below. Deviation from instructions may result in less than desired outcomes and procedure may need to be rescheduled.      Policy: Please arrive with an adult who can drive you home after the exam and stay with you for the next 24 hours, unless your provider says otherwise. The  will need to be confirmed prior to the start of your procedure.     The medicines used in the exam will make you sleepy. You will not be able to drive. You cannot take public transportation,  ride share services, or non-medical taxi service without a responsible caregiver.  Medical transport services are allowed with the requirement that a responsible caregiver will receive you at your destination.  We will require confirmation of availability from caregiver prior to procedure.      If you are taking blood thinning medication: Medications such as Coumadin, Plavix, Rivaroxaban (Xarelto)..etc, may need to be temporarily stopped for multiple days before your scheduled procedure. Talk to your doctor. Your prescribing doctor will give you directions to see if these medications should be changed or stopped prior to your exam. Procedure may be canceled if medications are not accurately held.     If you have Diabetes: Ask to have your exam early in the morning if possible. Call your doctor if you have questions regarding your diet modifications and/or diabetic medications during prep.       If you take Phentermine (Adipex-P, Lomaira): This MUST be held 7 days prior to  your scheduled procedure. Your procedure will be canceled if this medication has not been held.     To reschedule or cancel your procedure: Please call our scheduling team at:   [facility]: [phone], option 2  Monday through Friday, 8 a.m. to 4:30 p.m.    ---------------------------------------------------------------------------------------------------------------------   STANDARD Golytely (Colyte, Nulytely)  Prep Instructions for your Colonoscopy      Please read these instructions carefully at least 7 days prior to your colonoscopy procedure. Be sure to follow all directions completely. The inside of your colon must be clean to allow for a complete examination for the presence of any growths, polyps, and/or abnormalities, as well as their biopsy or removal. A number of tips are included in order to make this part of the procedure as comfortable as possible.    Getting ready:   A nurse will call you within a week of your exam to prescribe your bowel prep, review the prep instructions, and answer any other questions you have.   You must arrange for an adult to drive you home after your exam. Your colonoscopy cannot be done unless you have a ride. If you need to use public transportation, someone must ride with you and stay with you for a minimum of 6-24 hours.  Check with your insurance company to be sure they will cover this exam.    7 days before the exam:  Talk to your doctor:  If you take blood-thinners (such as Coumadin, Plavix, Xarelto), your prescription or schedule may need to change before the test.  Stop taking fiber supplements, multi-vitamins with iron, and medicines that contain iron.  Continue taking prescribed aspirin; talk to your prescribing doctor with any concerns.  Stop eating corn, nuts and foods with seeds.  These can stay in the colon for days.  If you have diabetes:  Ask to have your exam early in the morning.  Also, ask your doctor if you should change your diet  or medicines.    3 days before the exam:  Begin a low-fiber diet: No raw fruits or vegetables, whole wheat, seeds, nuts, popcorn or other high-fiber foods (see list on page). No binding agents: (bran, Metamucil, Fibercon) and no Olestra (a fat substitute).    One day before the exam:  You can have a light, low-fiber breakfast. But drink only clear liquids after 9 a.m. (see list below). Drink at least 8 to 10 full glasses of clear liquids during the day.   Fill the jug that contains the Golytely powder with warm water. Cover and shake until well mixed. Use a full gallon of water. Chill for 3 hours, but do not add ice.  You will start drinking half of the Golytely solution at 6 p.m. The timing of drinking the 2nd half of the Golytely solution depends on your exam time. See Step 2 below.  After you start drinking the solution, stay near a toilet. You may have watery stools (diarrhea), mild cramping, bloating , and nausea.     Step One:  At 3 p.m., take 2 tablets of Dulcolax (bisacodyl).  At 6 p.m. start drinking the Golytely solution. Drink an 8-ounce glass every 15 minutes until the jug is half empty. Drink each glass quickly.     Step Two:   If you arrive before 11 AM:  At 11 p.m. on the day before your exam:  Take 2 Dulcolax (Bisacodyl) tablets.   Start drinking the other half of the Golytely jug. Drink one 8-ounce glass every 15 minutes until the jug is empty. Drink each glass quickly.  If you arrive after 11 AM:  At 6 AM on the day of the exam:  Take 2 tablets of Dulcolax (Bisacodyl).   Drink the other half of the Golytely jug.   Drink one 8-ounce glass every 15 minutes until the jug is empty.   Drink each glass quickly.   You should finish the prep 4 hours before the exam.      Day of exam:    You may take your necessary morning medications with sips of water  Do not take diabetes medicine by mouth until after your exam.  You may drink clear liquids only up until 2 hours before your arrival time.  Do not smoke,  chew tobacco, or swallow anything, including water or gum for at least 2 hours before your arrival time. This is a safety issue. Your procedure could be cancelled if you do not follow directions.  Please arrive with a responsible adult who can take you home after the test and stay with you for a minimum of 24 hours: The medicine used will make you sleepy and forgetful. If you do not have someone to take you home, we will cancel your procedure. If using public transportation you must have someone to ride with you.  Please perform your nebulizer treatments and airway clearance therapy in the morning prior to the procedure (if applicable).  If you have asthma, bring your inhalers.      CLEAR LIQUIDS   You may have:  Water, tea, coffee (no milk or cream)  Soda pop, Gatorade (not red or purple)  Jell-O, Popsicles (no milk or fruit pieces - not red or purple)  Fat-free soup broth or bouillon  Plain hard candy, such as clear life savers (not red or purple)  Clear juices and fruit-flavored drinks, such as apple juice, white grape juice, Hi-C, and Sergei-Aid (not red or purple)   Do not have:  Milk or milk products such as ice cream, malts or shakes, or coffee creamer  Red or purple drinks of any kind such as cranberry juice or grape juice. Avoid red or purple Jell-O, Popsicles, Sergei-Aid, sorbet, sherbet and candy  Juices with pulp such as orange, grapefruit, pineapple or tomato juice  Cream soups of any kind  Alcohol and beer  Protein drinks or protein powder     LOW FIBER DIET   You may have:    Starches: White bread, rolls, biscuits, croissants, Shelia toast, white flour tortillas, waffles, pancakes, Italian toast; white rice, noodles, pasta, macaroni; cooked and peeled potatoes; plain crackers, saltines; cooked farina or cream of rice; puffed rice, corn flakes, Rice Krispies, Special K   Vegetables: tender cooked and canned, vegetable broths  Fruits and fruit juices: Strained fruit juice, canned fruit without seeds or skin  (not pineapple), applesauce, pear sauce, ripe bananas, melons (not watermelon)   Milk products: Milk (plain or flavored), cheese, cottage cheese, yogurt (no berries), custard, ice cream    Proteins: Tender, well-cooked ground beef, lamb, veal, ham, pork, chicken, turkey, fish or organ meats; eggs; creamy peanut butter   Fats and condiments:  Margarine, butter, oils, mayonnaise, sour cream, salad dressing, plain gravy; spices, cooked herbs; sugar, clear jelly, honey, syrup   Snacks, sweets and drinks: Pretzels, hard candy; plain cakes and cookies (no nuts or seeds); gelatin, plain pudding, sherbet, Popsicles; coffee, tea, carbonated ( fizzy ) drinks Do not have:    Starches: Breads or rolls that contain nuts, seeds or fruit; whole wheat or whole grain breads that contain more than 1 gram of fiber per slice; cornbread; corn or whole wheat tortillas; potatoes with skin; brown rice, wild rice, kasha (buckwheat), and oatmeal   Vegetables: Any raw or steamed vegetables; vegetables with seeds; corn in any form   Fruits and fruit juices: Prunes, prune juice, raisins and other dried fruits, berries and other fruits with seeds, canned pineapple juices with pulp such as orange, grapefruit, pineapple or tomato juice  Milk products: Any yogurt with nuts, seeds or berries   Proteins: Tough, fibrous meats with gristle; cooked dried beans, peas or lentils; crunchy peanut butter  Fats and condiments: Pickles, olives, relish, horseradish; jam, marmalade, preserves   Snacks, sweets and drinks: Popcorn, nuts, seeds, granola, coconut, candies made with nuts or seeds; all desserts that contain nuts, seeds, raisins and other dried fruits, coconut, whole grains or bran.        FAQ:    How do you know if your colon is cleaned out?   After completing the bowel prep, your bowel movements should be all liquid and yellow. Your bowel movements will look similar to urine in the toilet. If there are pieces of stool (poop) in the toilet, or if you  can't see to the bottom of the toilet, please call our office for advice. Call [phone] and ask to speak with a nurse.   Why do you need a responsible  to take you home and stay with you?  We require a responsible adult to take you home for your safety. The sedation medicines used to relax you during the procedure can impair your judgement and reaction time, make you forgetful and possible a little unsteady. Do not drive, make any important decisions, or sign any legal documents for 24 hours after your procedure.   It is normal to feel bloated and gassy after your procedure. Walking will help move the air through your colon. You can take non-aspirin pain relievers that contain acetaminophen (Tylenol).   When can you eat after your procedure?  You may resume your normal diet when you feel ready, unless advised otherwise by the doctor performing your procedure. Do not drink alcohol for 24 hours after your procedure.   You many resume normal activities (work, exercise, etc.) after 24 hours.   When will you get test results?  You should have your procedure results and any lab results (if applicable) by letter, [employer] message, or phone call within 2 weeks. If you have any questions, please call the doctor that referred you for the procedure.       Thank you for choosing  [facility], for your procedure. If you are sent a survey regarding your care, please take the time to complete the questionnaire. We value your feedback!             Updated: 6/22/2022

## 2023-04-07 NOTE — TELEPHONE ENCOUNTER
Pre assessment questions completed for upcoming Colonoscopy  procedure scheduled on 4/18/23    COVID policy reviewed.     Pre-op exam? N/A    Reviewed procedural arrival time 0945, procedure time 1030 and facility location [facility]; [address], [place] [address] -- This is time change     Designated  policy reviewed. Instructed to have someone stay 6 hours post procedure.     Anticoagulation/blood thinners? No    Electronic implanted devices? No    Diabetic? No    Reviewed procedure prep instructions.     Prep instructions sent via email.  Pt has not received letter yet. Email sent to: [email or web address]    Patient verbalized understanding and had no questions or concerns at this time.    Skylar Martinez RN  Endoscopy Procedure Pre Assessment RN

## 2023-04-18 ENCOUNTER — HOSPITAL ENCOUNTER (OUTPATIENT)
Facility: AMBULATORY SURGERY CENTER | Age: 53
Discharge: HOME | End: 2023-04-18
Attending: SPECIALIST | Admitting: SPECIALIST
Payer: COMMERCIAL

## 2023-04-18 VITALS
DIASTOLIC BLOOD PRESSURE: 79 MMHG | HEART RATE: 53 BPM | RESPIRATION RATE: 16 BRPM | OXYGEN SATURATION: 98 % | BODY MASS INDEX: 28.34 KG/M2 | TEMPERATURE: 97 F | SYSTOLIC BLOOD PRESSURE: 103 MMHG | WEIGHT: 190 LBS

## 2023-04-18 LAB — COLONOSCOPY: NORMAL

## 2023-04-18 PROCEDURE — 45378 DIAGNOSTIC COLONOSCOPY: CPT

## 2023-04-18 PROCEDURE — G8918 PT W/O PREOP ORDER IV AB PRO: HCPCS

## 2023-04-18 PROCEDURE — G8907 PT DOC NO EVENTS ON DISCHARG: HCPCS

## 2023-04-18 RX ORDER — FENTANYL CITRATE 50 UG/ML
INJECTION, SOLUTION INTRAMUSCULAR; INTRAVENOUS PRN
Status: DISCONTINUED | OUTPATIENT
Start: 2023-04-18 | End: 2023-04-18 | Stop reason: HOSPADM

## 2023-04-18 RX ORDER — LIDOCAINE 40 MG/G
CREAM TOPICAL
Status: DISCONTINUED | OUTPATIENT
Start: 2023-04-18 | End: 2023-04-19 | Stop reason: HOSPADM

## 2023-04-18 RX ORDER — ONDANSETRON 2 MG/ML
4 INJECTION INTRAMUSCULAR; INTRAVENOUS
Status: DISCONTINUED | OUTPATIENT
Start: 2023-04-18 | End: 2023-04-19 | Stop reason: HOSPADM

## 2023-04-18 NOTE — H&P
Pre-Endoscopy History and Physical     Pedrito Jenkins MRN# [id number]   YOB: 1970 Age: 52 year old     Date of Procedure: 4/18/2023  Primary care provider: Migue Martinez  Type of Endoscopy: colonoscopy  Reason for Procedure: screen for colorectal cancer  Type of Anesthesia Anticipated: Moderate sedation    HPI:    Pedrito is a 52 year old male who will be undergoing the above procedure.      A history and physical has been performed. The patient's medications and allergies have been reviewed. The risks and benefits of the procedure and the sedation options and risks were discussed with the patient.  All questions were answered and informed consent was obtained.      He denies a personal or family history of anesthesia complications or bleeding disorders.     No Known Allergies     Current Outpatient Medications   Medication     bisacodyl (DULCOLAX) 5 MG EC tablet     polyethylene glycol (GOLYTELY) 236 g suspension     No current facility-administered medications for this encounter.       Patient Active Problem List   Diagnosis     CARDIOVASCULAR SCREENING; LDL GOAL LESS THAN 160        Past Medical History:   Diagnosis Date     NO ACTIVE PROBLEMS         Past Surgical History:   Procedure Laterality Date     VASECTOMY      Dr. Narayanan       Social History     Tobacco Use     Smoking status: Never     Smokeless tobacco: Never   Vaping Use     Vaping status: Never Used   Substance Use Topics     Alcohol use: Yes     Alcohol/week: 2.0 - 3.0 standard drinks of alcohol     Types: 2 - 3 Standard drinks or equivalent per week     Comment: Couple times/month       Family History   Problem Relation Age of Onset     Parkinsonism Father      Hypertension No family hx of      Coronary Artery Disease No family hx of      Cerebrovascular Disease No family hx of      Diabetes No family hx of      Cancer No family hx of        REVIEW OF SYSTEMS:   5 point ROS negative except as noted above in HPI, including  "Gen., Resp., CV, GI &  system review.    PHYSICAL EXAM:   /77   Pulse 53   Temp 97  F (36.1  C) (Temporal)   Resp 16   Wt 86.2 kg (190 lb)   SpO2 95%   BMI 28.34 kg/m   Estimated body mass index is 28.34 kg/m  as calculated from the following:    Height as of 1/27/23: 1.744 m (5' 8.66\").    Weight as of this encounter: 86.2 kg (190 lb).   GENERAL APPEARANCE: healthy  MENTAL STATUS: alert  AIRWAY EXAM: Mallampatti Class I (visualization of the soft palate, fauces, uvula, anterior and posterior pillars)  RESP: lungs clear to auscultation - no rales, rhonchi or wheezes  CV: regular rates and rhythm, normal S1 S2, no S3 or S4 and no murmur, click or rub    DIAGNOSTICS:    Not indicated    IMPRESSION   ASA Class 1 - Healthy patient, no medical problems    PLAN:   Colonoscopy    The above has been forwarded to the consulting provider.      Signed Electronically by: Terrence Cole MD  April 18, 2023        "

## 2024-01-28 ASSESSMENT — ENCOUNTER SYMPTOMS
MYALGIAS: 0
DIARRHEA: 0
SORE THROAT: 0
JOINT SWELLING: 0
DYSURIA: 0
ABDOMINAL PAIN: 0
FEVER: 0
PARESTHESIAS: 1
NERVOUS/ANXIOUS: 0
ARTHRALGIAS: 1
EYE PAIN: 0
SHORTNESS OF BREATH: 0
CHILLS: 0
HEMATOCHEZIA: 0
WEAKNESS: 0
HEMATURIA: 0
PALPITATIONS: 0
CONSTIPATION: 0
HEARTBURN: 0
HEADACHES: 0
COUGH: 0
DIZZINESS: 0
NAUSEA: 0
FREQUENCY: 0

## 2024-01-29 ENCOUNTER — OFFICE VISIT (OUTPATIENT)
Dept: FAMILY MEDICINE | Facility: CLINIC | Age: 54
End: 2024-01-29
Attending: FAMILY MEDICINE
Payer: COMMERCIAL

## 2024-01-29 VITALS
DIASTOLIC BLOOD PRESSURE: 80 MMHG | TEMPERATURE: 97.2 F | WEIGHT: 199 LBS | SYSTOLIC BLOOD PRESSURE: 128 MMHG | OXYGEN SATURATION: 98 % | HEIGHT: 69 IN | RESPIRATION RATE: 16 BRPM | HEART RATE: 54 BPM | BODY MASS INDEX: 29.47 KG/M2

## 2024-01-29 DIAGNOSIS — R07.89 CHEST WALL PAIN: ICD-10-CM

## 2024-01-29 DIAGNOSIS — Z12.5 SCREENING FOR PROSTATE CANCER: ICD-10-CM

## 2024-01-29 DIAGNOSIS — G56.22 ENTRAPMENT OF LEFT ULNAR NERVE: ICD-10-CM

## 2024-01-29 DIAGNOSIS — Z13.1 SCREENING FOR DIABETES MELLITUS: ICD-10-CM

## 2024-01-29 DIAGNOSIS — Z00.00 ROUTINE GENERAL MEDICAL EXAMINATION AT A HEALTH CARE FACILITY: Primary | ICD-10-CM

## 2024-01-29 DIAGNOSIS — E66.3 OVERWEIGHT (BMI 25.0-29.9): ICD-10-CM

## 2024-01-29 DIAGNOSIS — E78.5 HYPERLIPIDEMIA LDL GOAL <100: ICD-10-CM

## 2024-01-29 DIAGNOSIS — R06.83 SNORING: ICD-10-CM

## 2024-01-29 DIAGNOSIS — R39.9 LOWER URINARY TRACT SYMPTOMS (LUTS): ICD-10-CM

## 2024-01-29 LAB
ALBUMIN SERPL BCG-MCNC: 4.4 G/DL (ref 3.5–5.2)
ALBUMIN UR-MCNC: NEGATIVE MG/DL
ALP SERPL-CCNC: 93 U/L (ref 40–150)
ALT SERPL W P-5'-P-CCNC: 19 U/L (ref 0–70)
ANION GAP SERPL CALCULATED.3IONS-SCNC: 11 MMOL/L (ref 7–15)
APPEARANCE UR: CLEAR
AST SERPL W P-5'-P-CCNC: 25 U/L (ref 0–45)
BACTERIA #/AREA URNS HPF: NORMAL /HPF
BILIRUB SERPL-MCNC: 0.5 MG/DL
BILIRUB UR QL STRIP: NEGATIVE
BUN SERPL-MCNC: 17.6 MG/DL (ref 6–20)
CALCIUM SERPL-MCNC: 9.5 MG/DL (ref 8.6–10)
CHLORIDE SERPL-SCNC: 104 MMOL/L (ref 98–107)
CHOLEST SERPL-MCNC: 161 MG/DL
COLOR UR AUTO: YELLOW
CREAT SERPL-MCNC: 1.09 MG/DL (ref 0.67–1.17)
DEPRECATED HCO3 PLAS-SCNC: 25 MMOL/L (ref 22–29)
EGFRCR SERPLBLD CKD-EPI 2021: 81 ML/MIN/1.73M2
ERYTHROCYTE [DISTWIDTH] IN BLOOD BY AUTOMATED COUNT: 12.2 % (ref 10–15)
FASTING STATUS PATIENT QL REPORTED: YES
GLUCOSE SERPL-MCNC: 105 MG/DL (ref 70–99)
GLUCOSE UR STRIP-MCNC: NEGATIVE MG/DL
HBA1C MFR BLD: 5.7 % (ref 0–5.6)
HCT VFR BLD AUTO: 43.2 % (ref 40–53)
HDLC SERPL-MCNC: 48 MG/DL
HGB BLD-MCNC: 15 G/DL (ref 13.3–17.7)
HGB UR QL STRIP: ABNORMAL
KETONES UR STRIP-MCNC: NEGATIVE MG/DL
LDLC SERPL CALC-MCNC: 92 MG/DL
LEUKOCYTE ESTERASE UR QL STRIP: NEGATIVE
MCH RBC QN AUTO: 30.9 PG (ref 26.5–33)
MCHC RBC AUTO-ENTMCNC: 34.7 G/DL (ref 31.5–36.5)
MCV RBC AUTO: 89 FL (ref 78–100)
NITRATE UR QL: NEGATIVE
NONHDLC SERPL-MCNC: 113 MG/DL
PH UR STRIP: 6 [PH] (ref 5–7)
PLATELET # BLD AUTO: 216 10E3/UL (ref 150–450)
POTASSIUM SERPL-SCNC: 4.4 MMOL/L (ref 3.4–5.3)
PROT SERPL-MCNC: 6.6 G/DL (ref 6.4–8.3)
PSA SERPL DL<=0.01 NG/ML-MCNC: 0.63 NG/ML (ref 0–3.5)
RBC # BLD AUTO: 4.86 10E6/UL (ref 4.4–5.9)
RBC #/AREA URNS AUTO: NORMAL /HPF
SODIUM SERPL-SCNC: 140 MMOL/L (ref 135–145)
SP GR UR STRIP: 1.02 (ref 1–1.03)
TRIGL SERPL-MCNC: 104 MG/DL
UROBILINOGEN UR STRIP-ACNC: 0.2 E.U./DL
WBC # BLD AUTO: 6.4 10E3/UL (ref 4–11)
WBC #/AREA URNS AUTO: NORMAL /HPF

## 2024-01-29 PROCEDURE — 36415 COLL VENOUS BLD VENIPUNCTURE: CPT | Performed by: FAMILY MEDICINE

## 2024-01-29 PROCEDURE — 99214 OFFICE O/P EST MOD 30 MIN: CPT | Mod: 25 | Performed by: FAMILY MEDICINE

## 2024-01-29 PROCEDURE — 80061 LIPID PANEL: CPT | Performed by: FAMILY MEDICINE

## 2024-01-29 PROCEDURE — 81001 URINALYSIS AUTO W/SCOPE: CPT | Performed by: FAMILY MEDICINE

## 2024-01-29 PROCEDURE — 99396 PREV VISIT EST AGE 40-64: CPT | Performed by: FAMILY MEDICINE

## 2024-01-29 PROCEDURE — 80053 COMPREHEN METABOLIC PANEL: CPT | Performed by: FAMILY MEDICINE

## 2024-01-29 PROCEDURE — G0103 PSA SCREENING: HCPCS | Performed by: FAMILY MEDICINE

## 2024-01-29 PROCEDURE — 83036 HEMOGLOBIN GLYCOSYLATED A1C: CPT | Performed by: FAMILY MEDICINE

## 2024-01-29 PROCEDURE — 85027 COMPLETE CBC AUTOMATED: CPT | Performed by: FAMILY MEDICINE

## 2024-01-29 ASSESSMENT — ENCOUNTER SYMPTOMS
ARTHRALGIAS: 1
NAUSEA: 0
WEAKNESS: 0
MYALGIAS: 0
COUGH: 0
DYSURIA: 0
CHILLS: 0
SHORTNESS OF BREATH: 0
JOINT SWELLING: 0
CONSTIPATION: 0
HEMATURIA: 0
EYE PAIN: 0
DIZZINESS: 0
PALPITATIONS: 0
SORE THROAT: 0
DIARRHEA: 0
FREQUENCY: 0
HEADACHES: 0
FEVER: 0
ABDOMINAL PAIN: 0
NERVOUS/ANXIOUS: 0

## 2024-01-29 ASSESSMENT — PAIN SCALES - GENERAL: PAINLEVEL: NO PAIN (0)

## 2024-01-29 NOTE — PATIENT INSTRUCTIONS
Important Takeaway Points From This Visit:  Call your insurance about where to get the shingles vaccine if interested.       As always, please call with any questions or concerns. I look forward to seeing you again soon!    Take care,  Dr. Martinez    Your current medication list is printed. Please keep this with you - it is helpful to bring this current list to any other medical appointments. It can also be helpful if you ever go to the emergency room or hospital.    If you had lab testing today we will call you with the results. The phone number we will call with your results is # [phone] (home) . If this is not the best number please call our clinic and change the number.    If you need any refills, please call your pharmacy and they will contact us.    If you have any further concerns or wish to schedule another appointment, please call our office at [phone].    If you have a medical emergency, please call 911.    Thank you for coming to [facility]!         Preventive Health Recommendations  Male Ages 50 - 64    Yearly exam:             See your health care provider every year in order to  o   Review health changes.   o   Discuss preventive care.    o   Review your medicines if your doctor has prescribed any.   Have a cholesterol test every 5 years, or more frequently if you are at risk for high cholesterol/heart disease.   Have a diabetes test (fasting glucose) every three years. If you are at risk for diabetes, you should have this test more often.   Have a colonoscopy at age 45, or have a yearly FIT test (stool test). These exams will check for colon cancer.    Talk with your health care provider about whether or not a prostate cancer screening test (PSA) is right for you.  You should be tested each year for STDs (sexually transmitted diseases), if you re at risk.     Shots: Get a flu shot each year. Get a tetanus shot every 10 years.     Nutrition:  Eat at least 5 servings of  fruits and vegetables daily.   Eat whole-grain bread, whole-wheat pasta and brown rice instead of white grains and rice.   Get adequate Calcium and Vitamin D.     Lifestyle  Exercise for at least 150 minutes a week (30 minutes a day, 5 days a week). This will help you control your weight and prevent disease.   Limit alcohol to one drink per day.   No smoking.   Wear sunscreen to prevent skin cancer.   See your dentist every six months for an exam and cleaning.   See your eye doctor every 1 to 2 years.

## 2024-01-29 NOTE — RESULT ENCOUNTER NOTE
Cyndie Major,    If you have not viewed these results on [employer] within 3 days, we will use an alternative method to contact you. We will contact you via the following protocol:    - Via letter if your results are normal.  - Via phone ([phone]) if your results are abnormal.     Here are my comments about your recent results:    UA - Your urine test was essentially normal. The machine thought there may be a small amount of blood in it; however, when reviewed further by our lab staff, no blood was seen when looked at under the microscope. No further follow-up is needed regarding these results.    Please call the clinic ([phone]), or message us on [employer] with any questions you may have.     Have a great day,    Dr. Saucedo

## 2024-01-29 NOTE — RESULT ENCOUNTER NOTE
Cyndie Major,    If you have not viewed these results on [employer] within 3 days, we will use an alternative method to contact you. We will contact you via the following protocol:    - Via letter if your results are normal.  - Via phone ([phone]) if your results are abnormal.     Here are my comments about your recent results:    CBC Results - Your cell counts were normal.    Please call the clinic ([phone]), or message us on [employer] with any questions you may have.     Have a great day,    Dr. Saucedo

## 2024-01-29 NOTE — PROGRESS NOTES
Preventive Care Visit  [facility] KYLE Martinez MD, Family Medicine  Jan 29, 2024    SUBJECTIVE:   Pedrito is a 53 year old, presenting for the following:  Physical        1/29/2024     7:14 AM   Additional Questions   Roomed by YK   Accompanied by self     Healthy Habits:     Getting at least 3 servings of Calcium per day:  NO    Bi-annual eye exam:  NO    Dental care twice a year:  Yes    Sleep apnea or symptoms of sleep apnea:  Excessive snoring    Diet:  Regular (no restrictions)    Frequency of exercise:  4-5 days/week    Duration of exercise:  30-45 minutes    Taking medications regularly:  Not Applicable    Medication side effects:  Not applicable    Additional concerns today:  Yes  Answers submitted by the patient for this visit:  Annual Preventive Visit (Submitted on 1/28/2024)  Chief Complaint: Annual Exam:  Blood in stool: No  heartburn: No  peripheral edema: No  mood changes: No  Skin sensation changes: Yes  impotence: No    Had a cough around November 2023. Coughed once, felt pull and now notices numbness left upper extremity pinky and ring fingers.    Noticing pain left lower sternal border. Occurs at random. Sharp pain. Not associated with exercise. No associated shortness of breath. Doesn't last very long. Palpation doesn't change it. Doesn't feel like heartburn. No radiation of symptoms.    Snoring excessively according to wife.    Still dealing with urinary frequency at times.    Today's PHQ-2 Score:       1/28/2024    10:18 AM   PHQ-2 ( 1999 [employer])   Q1: Little interest or pleasure in doing things 0   Q2: Feeling down, depressed or hopeless 0   PHQ-2 Score 0   Q1: Little interest or pleasure in doing things Not at all   Q2: Feeling down, depressed or hopeless Not at all   PHQ-2 Score 0     Social History     Tobacco Use    Smoking status: Never    Smokeless tobacco: Never   Substance Use Topics    Alcohol use: Yes     Alcohol/week: 2.0 - 3.0 standard drinks of  alcohol     Types: 2 - 3 Standard drinks or equivalent per week     Comment: Couple times/month         1/28/2024    10:17 AM   Alcohol Use   Prescreen: >3 drinks/day or >7 drinks/week? No          No data to display              Last PSA:   Prostate Specific Antigen Screen   Date Value Ref Range Status   01/27/2023 0.75 0.00 - 4.00 ug/L Final     Reviewed orders with patient. Reviewed health maintenance and updated orders accordingly - Yes  Lab work is in process  BP Readings from Last 3 Encounters:   01/29/24 128/80   04/18/23 103/79   01/27/23 132/74    Wt Readings from Last 3 Encounters:   01/29/24 90.3 kg (199 lb)   04/18/23 86.2 kg (190 lb)   01/27/23 88.5 kg (195 lb)                  Patient Active Problem List   Diagnosis    CARDIOVASCULAR SCREENING; LDL GOAL LESS THAN 160     Past Surgical History:   Procedure Laterality Date    COLONOSCOPY  4/18/23    COLONOSCOPY WITH CO2 INSUFFLATION N/A 04/18/2023    Procedure: COLONOSCOPY, WITH CO2 INSUFFLATION;  Surgeon: Terrence Cole MD;  Location: [facility]    VASECTOMY      Dr. Narayanan       Social History     Tobacco Use    Smoking status: Never    Smokeless tobacco: Never   Substance Use Topics    Alcohol use: Yes     Alcohol/week: 2.0 - 3.0 standard drinks of alcohol     Types: 2 - 3 Standard drinks or equivalent per week     Comment: Couple times/month     Family History   Problem Relation Age of Onset    Parkinsonism Father     Hypertension No family hx of     Coronary Artery Disease No family hx of     Cerebrovascular Disease No family hx of     Diabetes No family hx of     Cancer No family hx of          No current outpatient medications on file.     No Known Allergies    Reviewed and updated as needed this visit by clinical staff   Tobacco  Allergies  Meds  Problems  Med Hx  Surg Hx  Fam Hx          Reviewed and updated as needed this visit by Provider   Tobacco  Allergies  Meds  Problems  Med Hx  Surg Hx  Fam Hx        Social Hx Reviewed   Past  "Medical History:   Diagnosis Date    NO ACTIVE PROBLEMS       Past Surgical History:   Procedure Laterality Date    COLONOSCOPY  4/18/23    COLONOSCOPY WITH CO2 INSUFFLATION N/A 04/18/2023    Procedure: COLONOSCOPY, WITH CO2 INSUFFLATION;  Surgeon: Terrence Cole MD;  Location: [facility]    VASECTOMY      Dr. Narayanan     Review of Systems   Constitutional:  Negative for chills and fever.   HENT:  Negative for congestion, ear pain, hearing loss and sore throat.    Eyes:  Negative for pain and visual disturbance.   Respiratory:  Negative for cough and shortness of breath.    Cardiovascular:  Positive for chest pain. Negative for palpitations.   Gastrointestinal:  Negative for abdominal pain, constipation, diarrhea and nausea.   Genitourinary:  Positive for urgency. Negative for dysuria, frequency, genital sores, hematuria and penile discharge.   Musculoskeletal:  Positive for arthralgias. Negative for joint swelling and myalgias.   Skin:  Positive for rash.   Neurological:  Negative for dizziness, weakness and headaches.   Psychiatric/Behavioral:  The patient is not nervous/anxious.      OBJECTIVE:   /80   Pulse 54   Temp 97.2  F (36.2  C) (Temporal)   Resp 16   Ht 1.742 m (5' 8.58\")   Wt 90.3 kg (199 lb)   SpO2 98%   BMI 29.75 kg/m     Estimated body mass index is 29.75 kg/m  as calculated from the following:    Height as of this encounter: 1.742 m (5' 8.58\").    Weight as of this encounter: 90.3 kg (199 lb).  Physical Exam  GENERAL: alert and no distress  EYES: Eyes grossly normal to inspection, PERRL and conjunctivae and sclerae normal  HENT: ear canals and TM's normal, nose and mouth without ulcers or lesions  NECK: no adenopathy, no asymmetry, masses, or scars  RESP: lungs clear to auscultation - no rales, rhonchi or wheezes  CV: regular rate and rhythm, normal S1 S2, no S3 or S4, no murmur, click or rub, no peripheral edema  ABDOMEN: soft, nontender, no hepatosplenomegaly, no masses and bowel sounds " normal  MS: no gross musculoskeletal defects noted, no edema  SKIN: no suspicious lesions or rashes  NEURO: Normal strength and tone, mentation intact and speech normal  PSYCH: mentation appears normal, affect normal/bright  LYMPH: no cervical, supraclavicular, axillary, or inguinal adenopathy    Labs: pending    ASSESSMENT/PLAN:   1. Routine general medical examination at a health care facility  Discussed personal health and safety. Routine screenings as below. Appropriate anticipatory guidance, vaccinations, and health screening recommendations delivered according to the USPSTF and other appropriate society guidelines.  Patient understands and is agreeable with the plan ordered below.  - PRIMARY CARE FOLLOW-UP SCHEDULING  - REVIEW OF HEALTH MAINTENANCE PROTOCOL ORDERS  - Adult Sleep Eval & Management  Referral; Future  - UA with Microscopic reflex to Culture - lab collect; Future  - Lipid panel reflex to direct LDL Non-fasting; Future  - Prostate Specific Antigen Screen; Future  - CBC with platelets; Future  - Comprehensive metabolic panel; Future  - Hemoglobin A1c; Future  - UA with Microscopic reflex to Culture - lab collect  - Lipid panel reflex to direct LDL Non-fasting  - Prostate Specific Antigen Screen  - CBC with platelets  - Comprehensive metabolic panel  - Hemoglobin A1c  - UA Microscopic with Reflex to Culture    2. Chest wall pain  Reassured. Likely costochondritis, rib subluxation, intercostal muscle strain, etc. Symptoms are not exertional, improved somewhat with palpation. Discussed characteristics of angina and when to seek care/follow-up.    3. Hyperlipidemia LDL goal <100  Encourage diet and exercise changes. Discussed as a general rule, eat more plant based foods and smaller portions of animal based foods. Discussed reading nutrition labels. Consider dietary referral if desired. Does not meet current guidelines for statin therapy at this time.  - Lipid panel reflex to direct LDL  Non-fasting; Future  - Lipid panel reflex to direct LDL Non-fasting    4. Entrapment of left ulnar nerve  Discussed conservative cares. Refer for EMG, surgery if persistent/worsening.    5. Lower urinary tract symptoms (LUTS)  - UA with Microscopic reflex to Culture - lab collect; Future  - UA with Microscopic reflex to Culture - lab collect  - UA Microscopic with Reflex to Culture    6. Snoring  Referral given as requested. Agree with further evaluation by specialty based on patient symptoms.  - Adult Sleep Eval & Management  Referral; Future    7. Screening for diabetes mellitus  - Comprehensive metabolic panel; Future  - Hemoglobin A1c; Future  - Comprehensive metabolic panel  - Hemoglobin A1c    8. Screening for prostate cancer  Patient elects to undergo PSA screening after informed decision making process. This discussion with the patient included reviewing the benefits of testing such as: Ability to easily add on to existing blood work, screening for a common cancer in men which has treatment options and otherwise may have been silent, and possibility for early detection to prevent morbidity from future metastasis and/or death. Additionally, risks were discussed including possibility of false positive testing (leading to anxiety and further unnecessary testing/biopsies), possibility of over treating a cancer that may not affect a man in his lifetime, and the side effects of the current treatment options for prosate cancer (urinary incontinence, ED, etc).  - Prostate Specific Antigen Screen; Future  - Prostate Specific Antigen Screen    9. Overweight (BMI 25.0-29.9)  Noted. Encourage diet and exercise changes for weight loss and overall health improvement.    Counseling  Reviewed preventive health counseling, as reflected in patient instructions       Regular exercise       Healthy diet/nutrition       Vision screening       Hearing screening       Colorectal cancer screening       Prostate cancer  screening        He reports that he has never smoked. He has never used smokeless tobacco.      Signed Electronically by: Migue Martinez MD

## 2024-01-29 NOTE — RESULT ENCOUNTER NOTE
Cyndie Major,    If you have not viewed these results on [facility] within 3 days, we will use an alternative method to contact you. We will contact you via the following protocol:    - Via letter if your results are normal.  - Via phone ([phone]) if your results are abnormal.     Here are my comments about your recent results:    A1c - Your 3 month blood sugar average was slightly high between 5.7 and 6.4. This range is indicative of pre-diabetes. Individuals with pre-diabetes have a 10% per year of progressing to diabetes.    We should recheck this every year to monitor for progression to diabetes. Losing weight, a healthy diet, and exercise can help reduce your risk.      Please call the clinic ([phone]), or message us on [facility] with any questions you may have.     Have a great day,    Dr. Saucedo

## 2024-01-30 NOTE — RESULT ENCOUNTER NOTE
Cyndie Major,    If you have not viewed these results on [facility] within 3 days, we will use an alternative method to contact you. We will contact you via the following protocol:    - Via letter if your results are normal.  - Via phone ([phone]) if your results are abnormal.     Here are my comments about your recent results:    CMP Results - Your blood sugar was 105. Your kidney function, electrolytes, and liver function were normal.    Lipids - Your cholesterol lab results were normal.    PSA - Your Prostate cancer screening lab results were normal.        Please call the clinic ([phone]), or message us on [facility] with any questions you may have.     Have a great day,    Dr. Saucedo

## 2024-04-16 ENCOUNTER — OFFICE VISIT (OUTPATIENT)
Dept: URGENT CARE | Facility: URGENT CARE | Age: 54
End: 2024-04-16
Payer: COMMERCIAL

## 2024-04-16 VITALS
SYSTOLIC BLOOD PRESSURE: 143 MMHG | OXYGEN SATURATION: 98 % | TEMPERATURE: 98.5 F | DIASTOLIC BLOOD PRESSURE: 78 MMHG | RESPIRATION RATE: 18 BRPM | HEART RATE: 50 BPM | WEIGHT: 193.8 LBS | BODY MASS INDEX: 28.97 KG/M2

## 2024-04-16 DIAGNOSIS — J20.9 ACUTE BRONCHITIS WITH SYMPTOMS > 10 DAYS: Primary | ICD-10-CM

## 2024-04-16 PROCEDURE — 99213 OFFICE O/P EST LOW 20 MIN: CPT | Performed by: PHYSICIAN ASSISTANT

## 2024-04-16 RX ORDER — ALBUTEROL SULFATE 90 UG/1
2 AEROSOL, METERED RESPIRATORY (INHALATION) EVERY 6 HOURS PRN
Qty: 18 G | Refills: 0 | Status: SHIPPED | OUTPATIENT
Start: 2024-04-16 | End: 2024-05-29

## 2024-04-16 RX ORDER — AZITHROMYCIN 250 MG/1
TABLET, FILM COATED ORAL
Qty: 6 TABLET | Refills: 0 | Status: SHIPPED | OUTPATIENT
Start: 2024-04-16 | End: 2024-05-29

## 2024-04-16 RX ORDER — BENZONATATE 200 MG/1
200 CAPSULE ORAL 3 TIMES DAILY PRN
Qty: 30 CAPSULE | Refills: 0 | Status: SHIPPED | OUTPATIENT
Start: 2024-04-16 | End: 2024-04-26

## 2024-04-16 ASSESSMENT — ENCOUNTER SYMPTOMS
DIARRHEA: 0
SINUS PRESSURE: 0
COUGH: 1
PALPITATIONS: 0
CHILLS: 0
CHEST TIGHTNESS: 1
FATIGUE: 0
SINUS PAIN: 0
FEVER: 1
NAUSEA: 0
SORE THROAT: 0
WHEEZING: 1
CARDIOVASCULAR NEGATIVE: 1
RHINORRHEA: 1
SHORTNESS OF BREATH: 1
VOMITING: 0

## 2024-04-16 NOTE — PROGRESS NOTES
Winifred Major is a 53 year old, presenting for the following health issues:  URI (Chest congestion/tightness, cough x2 weeks/Feels feverish at night )    HPI   Acute Illness  Acute illness concerns:   Onset/Duration: 2weeks  Symptoms:  Fever: YES  Chills/Sweats: No  Headache (location?): No  Sinus Pressure: No  Conjunctivitis:  No  Ear Pain: no  Rhinorrhea: YES  Congestion: YES  Sore Throat: No  Cough: YES-productive of green sputum, with shortness of breath, chest tightness  Wheeze: YES  Decreased Appetite: No  Nausea: No  Vomiting: No  Diarrhea: No  Dysuria/Freq.: No  Dysuria or Hematuria: No  Fatigue/Achiness: YES  Sick/Strep Exposure: No  Therapies tried and outcome: rest,fluids,cough meds with minimal relief    Patient Active Problem List   Diagnosis    CARDIOVASCULAR SCREENING; LDL GOAL LESS THAN 160     No current outpatient medications on file.     No current facility-administered medications for this visit.      No Known Allergies    Review of Systems   Constitutional:  Positive for fever. Negative for chills and fatigue.   HENT:  Positive for congestion and rhinorrhea. Negative for ear pain, sinus pressure, sinus pain and sore throat.    Respiratory:  Positive for cough, chest tightness, shortness of breath and wheezing.    Cardiovascular: Negative.  Negative for chest pain, palpitations and leg swelling.   Gastrointestinal:  Negative for diarrhea, nausea and vomiting.   All other systems reviewed and are negative.          Objective    BP (!) 143/78   Pulse 50   Temp 98.5  F (36.9  C) (Oral)   Resp 18   Wt 87.9 kg (193 lb 12.8 oz)   SpO2 98%   BMI 28.97 kg/m    Body mass index is 28.97 kg/m .  Physical Exam  Vitals and nursing note reviewed.   Constitutional:       General: He is not in acute distress.     Appearance: Normal appearance. He is normal weight. He is not ill-appearing.   HENT:      Head: Normocephalic and atraumatic.      Ears:      Comments: TMs are intact without any erythema  or bulging bilaterally.  Airway is patent.     Nose: Nose normal.      Mouth/Throat:      Lips: Pink.      Mouth: Mucous membranes are moist.      Pharynx: Oropharynx is clear. Uvula midline. No pharyngeal swelling, oropharyngeal exudate, posterior oropharyngeal erythema or uvula swelling.      Tonsils: No tonsillar exudate or tonsillar abscesses.   Eyes:      General: No scleral icterus.     Conjunctiva/sclera: Conjunctivae normal.      Pupils: Pupils are equal, round, and reactive to light.   Neck:      Thyroid: No thyromegaly.   Cardiovascular:      Rate and Rhythm: Normal rate and regular rhythm.      Pulses: Normal pulses.      Heart sounds: Normal heart sounds, S1 normal and S2 normal. No murmur heard.     No friction rub. No gallop.   Pulmonary:      Effort: Pulmonary effort is normal. No tachypnea, accessory muscle usage, respiratory distress or retractions.      Breath sounds: Normal breath sounds and air entry. No stridor. No decreased breath sounds, wheezing, rhonchi or rales.   Musculoskeletal:      Cervical back: Normal range of motion and neck supple.   Lymphadenopathy:      Cervical: No cervical adenopathy.   Skin:     General: Skin is warm and dry.      Findings: No rash.   Neurological:      Mental Status: He is alert and oriented to person, place, and time.   Psychiatric:         Mood and Affect: Mood normal.         Behavior: Behavior normal.         Thought Content: Thought content normal.         Judgment: Judgment normal.             Assessment/Plan:  Acute bronchitis with symptoms > 10 days:  Will treat with zithromax X5days, tessalon perles, and albuterol inh as needed for symptoms.  Recommend treatment with rest, fluids and chicken soup. Tylenol/ibuprofen prn fever/pain.  Recheck in clinic if symptoms worsen or if symptoms do not improve.  To the ER if he develops hemoptysis, chest pain, fevers>102, worsening shortness of breath/wheezing.    -     azithromycin (ZITHROMAX) 250 MG tablet; 2  tablets the first day, then 1 tablet daily for the next 4 days  -     benzonatate (TESSALON) 200 MG capsule; Take 1 capsule (200 mg) by mouth 3 times daily as needed for cough  -     albuterol (PROAIR HFA/PROVENTIL HFA/VENTOLIN HFA) 108 (90 Base) MCG/ACT inhaler; Inhale 2 puffs into the lungs every 6 hours as needed for shortness of breath, wheezing or cough        Cass See JAY Song

## 2024-05-28 ASSESSMENT — SLEEP AND FATIGUE QUESTIONNAIRES
HOW LIKELY ARE YOU TO NOD OFF OR FALL ASLEEP WHILE SITTING AND READING: MODERATE CHANCE OF DOZING
HOW LIKELY ARE YOU TO NOD OFF OR FALL ASLEEP WHEN YOU ARE A PASSENGER IN A CAR FOR AN HOUR WITHOUT A BREAK: SLIGHT CHANCE OF DOZING
HOW LIKELY ARE YOU TO NOD OFF OR FALL ASLEEP WHILE SITTING INACTIVE IN A PUBLIC PLACE: WOULD NEVER DOZE
HOW LIKELY ARE YOU TO NOD OFF OR FALL ASLEEP WHILE SITTING AND TALKING TO SOMEONE: WOULD NEVER DOZE
HOW LIKELY ARE YOU TO NOD OFF OR FALL ASLEEP WHILE WATCHING TV: MODERATE CHANCE OF DOZING
HOW LIKELY ARE YOU TO NOD OFF OR FALL ASLEEP IN A CAR, WHILE STOPPED FOR A FEW MINUTES IN TRAFFIC: WOULD NEVER DOZE
HOW LIKELY ARE YOU TO NOD OFF OR FALL ASLEEP WHILE SITTING QUIETLY AFTER LUNCH WITHOUT ALCOHOL: SLIGHT CHANCE OF DOZING
HOW LIKELY ARE YOU TO NOD OFF OR FALL ASLEEP WHILE LYING DOWN TO REST IN THE AFTERNOON WHEN CIRCUMSTANCES PERMIT: HIGH CHANCE OF DOZING

## 2024-05-29 ENCOUNTER — OFFICE VISIT (OUTPATIENT)
Dept: SLEEP MEDICINE | Facility: CLINIC | Age: 54
End: 2024-05-29
Attending: FAMILY MEDICINE
Payer: COMMERCIAL

## 2024-05-29 VITALS
OXYGEN SATURATION: 95 % | DIASTOLIC BLOOD PRESSURE: 84 MMHG | HEIGHT: 68 IN | WEIGHT: 196.2 LBS | HEART RATE: 60 BPM | RESPIRATION RATE: 12 BRPM | SYSTOLIC BLOOD PRESSURE: 128 MMHG | BODY MASS INDEX: 29.73 KG/M2

## 2024-05-29 DIAGNOSIS — G47.10 ORGANIC HYPERSOMNIA: ICD-10-CM

## 2024-05-29 DIAGNOSIS — R06.83 SNORING: Primary | ICD-10-CM

## 2024-05-29 DIAGNOSIS — F51.04 CHRONIC INSOMNIA: ICD-10-CM

## 2024-05-29 PROCEDURE — 99204 OFFICE O/P NEW MOD 45 MIN: CPT | Performed by: INTERNAL MEDICINE

## 2024-05-29 NOTE — PATIENT INSTRUCTIONS
Read the book Say Good Night To Insomnia                                               Online CBT-I    Your health care provider has recommended our online cognitive-behavioral therapy program for insomnia (online CBT-I).  Our online CBT-I program is a collaboration between [facility] and the [facility], developers of the GO! To Sleep program.  The program followsinsomnia  treatment strategies and recommendations  similar to those used in our [facility]ics.     How It works:    Research indicates that online CBT-I can be as effective as in-person therapy.  The Go! to Sleep program uses proven methods to help you improves your sleep from the comfort and privacy of your own home.  A recent study in the journal SLEEP found that 91% of patients who completed a five-week online program for insomnia reported improvement in sleep.    What You Get:    The cost for an entire 6 week program is $40.  For this fee you will have an online guide to learning how to improve your sleep using daily sleep logs that help individualize your program.  You will learn the basic science of sleep and why certain behaviors can be detrimental to your sleep.  You will also be given activities to help you get the sleep you needs including specially crafted audio relaxation practices that will help you manage stress and improve your sleep.    Who its for:     GO! To Sleep was designed for those experiencing short-term  insomnia and long-term insomnia lasting more than six months.  If you use sleep medication on an occasional basis, the program will help you learn techniques that will help you sleep better without medication.      Click on the link below to get started today:                           [email or web address]             Once you are registered you can share your sleep log data with [facility] where your health provider will be able to review your sleep log and progress.  Once  "you begin the program, go to the left side navigation bar and click on the  share sleep log  button:                                        This takes you to the next page where you enter the provider code [email or web address] and click Locate:                 This brings you to the verification page where you should see [facility] identified as your provider                                                                           By clicking \"Submit\" your sleep log data will be sent to our secure [facility] portal for review by you provider.     For Help Contact Customer [facility] At:    [email or web address]            BMI Readings from Last 3 Encounters:   04/16/24 28.97 kg/m    01/29/24 29.75 kg/m    04/18/23 28.34 kg/m      What is BMI?  Body mass index (BMI) is one way to tell whether you are at a healthy weight, overweight, or obese. It measures your weight in relation to your height.  A BMI of 18.5 to 24.9 is in the healthy range. A person with a BMI of 25 to 29.9 is considered overweight, and someone with a BMI of 30 or greater is considered obese.  Another way to find out if you are at risk for health problems caused by overweight and obesity is to measure your waist. If you are a woman and your waist is more than 35 inches, or if you are a man and your waist is more than 40 inches, your risk of disease may be higher.  More than two-thirds of American adults are considered overweight or obese. Being overweight or obese increases the risk for further weight gain.  Excess weight may lead to heart disease and diabetes. Creating and following plans for healthy eating and physical activity may help you improve your health.    Methods for maintaining or losing weight.  Weight control is part of healthy lifestyle and includes exercise, emotional health, and healthy eating habits.  Careful eating habits lifelong is the mainstay of weight control.  Though there are significant health benefits from " weight loss, long-term weight loss with diet alone may be very difficult to achieve- studies show long-term success with dietary management in less than 10% of people. Attaining a healthy weight may be especially difficult to achieve in those with severe obesity. In some cases, medications, devices and surgical management might be considered.    What can you do?  If you are overweight or obese and are interested in methods for weight loss, you should discuss this with your provider. In addition, we recommend that you review healthy life styles and methods for weight loss available through the National Institutes of Health patient information sites:   http://win.niddk.nih.gov/publications/index.htm

## 2024-05-29 NOTE — NURSING NOTE
"Chief Complaint   Patient presents with    Sleep Problem     Patient presents to clinic for a consultation on snoring.       Initial /84   Pulse 60   Resp 12   Ht 1.727 m (5' 8\")   Wt 89 kg (196 lb 3.2 oz)   SpO2 95%   BMI 29.83 kg/m   Estimated body mass index is 29.83 kg/m  as calculated from the following:    Height as of this encounter: 1.727 m (5' 8\").    Weight as of this encounter: 89 kg (196 lb 3.2 oz).    Medication Reconciliation: complete  ESS: 9  Neck circumference: 15.50 inches / 39 centimeters.  DME: N/A  Chapis Badillo CMA      "

## 2024-05-29 NOTE — PROGRESS NOTES
Outpatient Sleep Medicine Consultation:      Name: Pedrito Jenkins MRN# [id number]   Age: 53 year old YOB: 1970     Date of Consultation: May 29, 2024  Consultation is requested by: Migue Martinez MD  [address] [facility] [place] [address] Migue Martinez  Primary care provider: Migue Martinez       Reason for Sleep Consult:     Pedrito Jenkins is sent by Migue Martinez for a sleep consultation regarding snoring.    Patient s Reason for visit  Pedrito Jenkins main reason for visit: I snore loudly and keep my spouse from sleeping.  Patient states problem(s) started: A few years ago  Pedrito Jenkins's goals for this visit: Advice/ tool to reduce snoring           Assessment and Plan:     Socially disruptive snoring, mild sleepiness in afternoon (ESS 9), sleep maintenance difficulties   - Patient appears to be a good candidate for Home Sleep Test STOPBANG 4  -  If HSAT normal would recommend attended Polysomnogram. If mild obstructive sleep apnea would want mandibular advancement device. If moderate-severe would recommend CPAP     Sleep onset, maintenance difficulties (MIRELA 15)  Suspect psychophysiologic insomnia.  We discussed stimulus control, sleep restriction and regular wake times.   - Read the book Say Good Night To Insomnia    - Consider referral for cognitive behavioral training          Summary Counseling:    Sleep Testing Reviewed  Obstructive Sleep Apnea Reviewed  Complications of Untreated Sleep Apnea Reviewed  Treatment options for obstructive sleep apnea reviewed       I spent 30 minutes with patient including counseling, and 5 minutes with chart review, and documentation     CC: Migue Martinez          History of Present Illness:        SLEEP-WAKE SCHEDULE:     Work/School Days: Patient goes to school/work: Yes   Usually gets into bed at 9:30  Takes patient about 20 minites to fall asleep  Has trouble falling asleep Every few days 2 nights per week due to an  over-active mind    Wakes up in the middle of the night 3 - 4 times.  Wakes up due to Snorting self awake;External stimuli (bed partner, pets, noise, etc)  He has trouble falling back asleep 3 times a week due to an over-active mind    It usually takes Half hour to get back to sleep  Patient is usually up at 5 - 5:30  Uses alarm: Yes    Weekends/Non-work Days/All Other Days:  Usually gets into bed at 10:30 - 11:00   Takes patient about 20 monutes to fall asleep  Patient is usually up at 7 - 8   Uses alarm: No    Sleep Need  Patient gets  6.5 - 7 sleep on average   Patient thinks he needs about 7-8 sleep    Pedrito Jenkins prefers to sleep in this position(s): Side   Patient states they do the following activities in bed: Read;Use phone, computer, or tablet     Naps  Patient takes a purposeful nap Never times a week    He dozes off unintentionally 3 times per week days per week  Patient has had a driving accident or near-miss due to sleepiness/drowsiness: No      SLEEP DISRUPTIONS:    Breathing/Snoring  Patient snores:Yes  Other people complain about his snoring: Yes  Patient has been told he stops breathing in his sleep:No  He has issues with the following: Morning mouth dryness    Movement:  Patient gets pain, discomfort, with an urge to move:  No  Patient has been told he kicks his legs at night:        Behaviors in Sleep:  Pedrito Jenkins has experienced the following behaviors while sleeping:    He has experienced sudden muscle weakness during the day: No      Is there anything else you would like your sleep provider to know:        CAFFEINE AND OTHER SUBSTANCES:    Patient consumes caffeinated beverages per day:  One coffee in the morning.  Occasionally a tea around 1pm  Last caffeine use is usually: 8am or  List of any prescribed or over the counter stimulants that patient takes: 2pm (tea)  List of any prescribed or over the counter sleep medication patient takes:    List of previous sleep medications that patient  has tried:    Patient drinks alcohol to help them sleep: No  Patient drinks alcohol near bedtime: No    Family History:  Patient has a family member been diagnosed with a sleep disorder: No            SCALES:    EPWORTH SLEEPINESS SCALE         5/29/2024     7:00 AM    [place] Sleepiness Scale ( SELENA Leblanc  [id number]<br>ESS - [place]/English - Final version - 21 Nov 07 - [facility] [place].)   [place] Score (Sleep) 9         INSOMNIA SEVERITY INDEX (MIRELA)          5/29/2024     7:00 AM   Insomnia Severity Index (MIRELA)   Difficulty falling asleep 1   Difficulty staying asleep 2   Problems waking up too early 2   How SATISFIED/DISSATISFIED are you with your CURRENT sleep pattern? 2   How NOTICEABLE to others do you think your sleep problem is in terms of impairing the quality of your life? 4   How WORRIED/DISTRESSED are you about your current sleep problem? 2   To what extent do you consider your sleep problem to INTERFERE with your daily functioning (e.g. daytime fatigue, mood, ability to function at work/daily chores, concentration, memory, mood, etc.) CURRENTLY? 2   MIRELA Total Score 15       Guidelines for Scoring/Interpretation:  Total score categories:  0-7 = No clinically significant insomnia   8-14 = Subthreshold insomnia   15-21 = Clinical insomnia (moderate severity)  22-28 = Clinical insomnia (severe)  Used via courtesy of www.[employer].va.gov with permission from Vinh Lagunas PhD., [facility]          Allergies:    No Known Allergies    Medications:    No current outpatient medications on file.       Problem List:  Patient Active Problem List    Diagnosis Date Noted    CARDIOVASCULAR SCREENING; LDL GOAL LESS THAN 160 10/31/2010     Priority: Low        Past Medical/Surgical History:  Past Medical History:   Diagnosis Date    NO ACTIVE PROBLEMS      Past Surgical History:   Procedure Laterality Date    COLONOSCOPY WITH CO2 INSUFFLATION N/A 04/18/2023    Procedure: COLONOSCOPY, WITH CO2 INSUFFLATION;   Surgeon: Terrence Cole MD;  Location: [facility] OR    VASECTOMY      Dr. Narayanan       Social History:  Social History     Socioeconomic History    Marital status:      Spouse name: 3+1    Number of children: 3    Years of education: Not on file    Highest education level: Not on file   Occupational History     Employer: [employer]   Tobacco Use    Smoking status: Never     Passive exposure: Never    Smokeless tobacco: Never   Vaping Use    Vaping status: Never Used   Substance and Sexual Activity    Alcohol use: Yes     Alcohol/week: 2.0 - 3.0 standard drinks of alcohol     Types: 2 - 3 Standard drinks or equivalent per week     Comment: Couple times/month    Drug use: No    Sexual activity: Yes     Partners: Female     Birth control/protection: Male Surgical   Other Topics Concern    Parent/sibling w/ CABG, MI or angioplasty before 65F 55M? No     Service Not Asked    Blood Transfusions Not Asked    Caffeine Concern Not Asked    Occupational Exposure Not Asked    Hobby Hazards Not Asked    Sleep Concern No    Stress Concern No    Weight Concern No    Special Diet No    Back Care Not Asked    Exercise Yes    Bike Helmet Not Asked    Seat Belt Yes    Self-Exams Not Asked   Social History Narrative    Not on file     Social Determinants of Health     Financial Resource Strain: Low Risk  (1/28/2024)    Financial Resource Strain     Within the past 12 months, have you or your family members you live with been unable to get utilities (heat, electricity) when it was really needed?: No   Food Insecurity: Low Risk  (1/28/2024)    Food Insecurity     Within the past 12 months, did you worry that your food would run out before you got money to buy more?: No     Within the past 12 months, did the food you bought just not last and you didn t have money to get more?: No   Transportation Needs: Low Risk  (1/28/2024)    Transportation Needs     Within the past 12 months, has lack of transportation kept you from medical  appointments, getting your medicines, non-medical meetings or appointments, work, or from getting things that you need?: No   Physical Activity: Not on file   Stress: Not on file   Social Connections: Not on file   Interpersonal Safety: Low Risk  (1/29/2024)    Interpersonal Safety     Do you feel physically and emotionally safe where you currently live?: Yes     Within the past 12 months, have you been hit, slapped, kicked or otherwise physically hurt by someone?: No     Within the past 12 months, have you been humiliated or emotionally abused in other ways by your partner or ex-partner?: No   Housing Stability: Low Risk  (1/28/2024)    Housing Stability     Do you have housing? : Yes     Are you worried about losing your housing?: No       Family History:  Family History   Problem Relation Age of Onset    Parkinsonism Father     Hypertension No family hx of     Coronary Artery Disease No family hx of     Cerebrovascular Disease No family hx of     Diabetes No family hx of     Cancer No family hx of        Review of Systems:  A complete review of systems reviewed by me is negative with the exeption of what has been mentioned in the history of present illness.  In the last TWO WEEKS have you experienced any of the following symptoms?  Fevers: No  Night Sweats: No  Weight Gain: No  Pain at Night: No  Double Vision: No  Changes in Vision: No  Difficulty Breathing through Nose: No  Sore Throat in Morning: No  Dry Mouth in the Morning: Yes  Shortness of Breath Lying Flat: No  Shortness of Breath With Activity: No  Awakening with Shortness of Breath: No  Increased Cough: No  Heart Racing at Night: No  Swelling in Feet or Legs: No  Diarrhea at Night: No  Heartburn at Night: No  Urinating More than Once at Night: No  Losing Control of Urine at Night: No  Joint Pains at Night: No  Headaches in Morning: No  Weakness in Arms or Legs: No  Depressed Mood: No  Anxiety: No     Physical Examination:  Vitals: /84   Pulse 60   " Resp 12   Ht 1.727 m (5' 8\")   Wt 89 kg (196 lb 3.2 oz)   SpO2 95%   BMI 29.83 kg/m    BMI= Body mass index is 29.83 kg/m .    Neck Cir (cm): 39 cm      SpO2 Readings from Last 4 Encounters:   05/29/24 95%   04/16/24 98%   01/29/24 98%   04/18/23 98%       GENERAL APPEARANCE: alert and no distress  EYES: Eyes grossly normal to inspection  NECK: not generous size  LUNGS: no shortness of breath , cough  NEURO: mentation intact, speech normal and cranial nerves 2-12 appear intact  PSYCH: affect normal/bright             Data: All pertinent previous laboratory data reviewed       Recent Labs   Lab Test 01/29/24  0803 01/27/23  0828    139   POTASSIUM 4.4 4.2   CHLORIDE 104 107   CO2 25 25   ANIONGAP 11 7   * 105*   BUN 17.6 18   CR 1.09 0.96   NEY 9.5 9.4       Recent Labs   Lab Test 01/29/24  0803   WBC 6.4   RBC 4.86   HGB 15.0   HCT 43.2   MCV 89   MCH 30.9   MCHC 34.7   RDW 12.2          Recent Labs   Lab Test 01/29/24  0803   PROTTOTAL 6.6   ALBUMIN 4.4   BILITOTAL 0.5   ALKPHOS 93   AST 25   ALT 19         Carlos Melendrez MD 5/29/2024           "

## 2024-05-31 NOTE — RESULT ENCOUNTER NOTE
Please inform of results if patient has not viewed in [employer] within 3 business days.    There were a few bacteria present on your urine, test. However, no other signs of infection. I will send this for a culture to determine if this could be a actual UTI or a contaminant from your skin.    Please call the clinic with any questions you may have.     Have a great day,    Dr. Saucedo Requested Prescriptions     Refused Prescriptions Disp Refills    amLODIPine (NORVASC) 5 MG tablet 90 tablet 0     Sig: Take 1 tablet by mouth daily     Refused By: ESAU JOHNSON     Reason for Refusal: Duplicate Request     Called pharmacy; med is already ready for . Does not need new script.    Future Appointments   Date Time Provider Department Center   7/17/2024  3:00 PM Ron Dumont MD [facility]   8/26/2024  1:00 PM PACEMAKER3, [facility]   8/26/2024  1:20 PM Pily Lopez, APRN - [facility]   11/7/2024  9:10 AM Alayna De La Garza MD [facility]   2/25/2025  1:20 PM PACEMAK[facility], [facility]   2/25/2025  1:40 PM Belen Rolon MD [facility]

## 2024-06-21 ENCOUNTER — OFFICE VISIT (OUTPATIENT)
Dept: FAMILY MEDICINE | Facility: CLINIC | Age: 54
End: 2024-06-21
Payer: COMMERCIAL

## 2024-06-21 VITALS
OXYGEN SATURATION: 97 % | HEIGHT: 69 IN | WEIGHT: 195 LBS | TEMPERATURE: 97.5 F | DIASTOLIC BLOOD PRESSURE: 70 MMHG | RESPIRATION RATE: 16 BRPM | HEART RATE: 54 BPM | SYSTOLIC BLOOD PRESSURE: 112 MMHG | BODY MASS INDEX: 28.88 KG/M2

## 2024-06-21 DIAGNOSIS — R53.83 FATIGUE, UNSPECIFIED TYPE: ICD-10-CM

## 2024-06-21 DIAGNOSIS — A04.1 ENTEROTOXIGENIC ESCHERICHIA COLI INFECTION: ICD-10-CM

## 2024-06-21 DIAGNOSIS — R11.0 NAUSEA: Primary | ICD-10-CM

## 2024-06-21 LAB
ALBUMIN SERPL BCG-MCNC: 4.5 G/DL (ref 3.5–5.2)
ALP SERPL-CCNC: 93 U/L (ref 40–150)
ALT SERPL W P-5'-P-CCNC: 17 U/L (ref 0–70)
ANION GAP SERPL CALCULATED.3IONS-SCNC: 9 MMOL/L (ref 7–15)
AST SERPL W P-5'-P-CCNC: 24 U/L (ref 0–45)
BASOPHILS # BLD AUTO: 0 10E3/UL (ref 0–0.2)
BASOPHILS NFR BLD AUTO: 1 %
BILIRUB SERPL-MCNC: 0.5 MG/DL
BUN SERPL-MCNC: 14.9 MG/DL (ref 6–20)
CALCIUM SERPL-MCNC: 9.7 MG/DL (ref 8.6–10)
CHLORIDE SERPL-SCNC: 105 MMOL/L (ref 98–107)
CREAT SERPL-MCNC: 1.04 MG/DL (ref 0.67–1.17)
CRP SERPL-MCNC: 4.67 MG/L
DEPRECATED HCO3 PLAS-SCNC: 25 MMOL/L (ref 22–29)
EGFRCR SERPLBLD CKD-EPI 2021: 86 ML/MIN/1.73M2
EOSINOPHIL # BLD AUTO: 0.2 10E3/UL (ref 0–0.7)
EOSINOPHIL NFR BLD AUTO: 4 %
ERYTHROCYTE [DISTWIDTH] IN BLOOD BY AUTOMATED COUNT: 12.4 % (ref 10–15)
ERYTHROCYTE [SEDIMENTATION RATE] IN BLOOD BY WESTERGREN METHOD: 4 MM/HR (ref 0–20)
GLUCOSE SERPL-MCNC: 97 MG/DL (ref 70–99)
HCT VFR BLD AUTO: 42.4 % (ref 40–53)
HGB BLD-MCNC: 14.6 G/DL (ref 13.3–17.7)
IMM GRANULOCYTES # BLD: 0 10E3/UL
IMM GRANULOCYTES NFR BLD: 0 %
LIPASE SERPL-CCNC: 41 U/L (ref 13–60)
LYMPHOCYTES # BLD AUTO: 1.1 10E3/UL (ref 0.8–5.3)
LYMPHOCYTES NFR BLD AUTO: 19 %
MCH RBC QN AUTO: 30.4 PG (ref 26.5–33)
MCHC RBC AUTO-ENTMCNC: 34.4 G/DL (ref 31.5–36.5)
MCV RBC AUTO: 88 FL (ref 78–100)
MONOCYTES # BLD AUTO: 0.6 10E3/UL (ref 0–1.3)
MONOCYTES NFR BLD AUTO: 10 %
NEUTROPHILS # BLD AUTO: 3.9 10E3/UL (ref 1.6–8.3)
NEUTROPHILS NFR BLD AUTO: 66 %
PLATELET # BLD AUTO: 223 10E3/UL (ref 150–450)
POTASSIUM SERPL-SCNC: 4.4 MMOL/L (ref 3.4–5.3)
PROT SERPL-MCNC: 6.6 G/DL (ref 6.4–8.3)
RBC # BLD AUTO: 4.8 10E6/UL (ref 4.4–5.9)
SODIUM SERPL-SCNC: 139 MMOL/L (ref 135–145)
TSH SERPL DL<=0.005 MIU/L-ACNC: 2.17 UIU/ML (ref 0.3–4.2)
VIT B12 SERPL-MCNC: 486 PG/ML (ref 232–1245)
VIT D+METAB SERPL-MCNC: 29 NG/ML (ref 20–50)
WBC # BLD AUTO: 5.9 10E3/UL (ref 4–11)

## 2024-06-21 PROCEDURE — 86364 TISS TRNSGLTMNASE EA IG CLAS: CPT | Performed by: FAMILY MEDICINE

## 2024-06-21 PROCEDURE — 86140 C-REACTIVE PROTEIN: CPT | Performed by: FAMILY MEDICINE

## 2024-06-21 PROCEDURE — 83690 ASSAY OF LIPASE: CPT | Performed by: FAMILY MEDICINE

## 2024-06-21 PROCEDURE — 85652 RBC SED RATE AUTOMATED: CPT | Performed by: FAMILY MEDICINE

## 2024-06-21 PROCEDURE — 84443 ASSAY THYROID STIM HORMONE: CPT | Performed by: FAMILY MEDICINE

## 2024-06-21 PROCEDURE — 82607 VITAMIN B-12: CPT | Performed by: FAMILY MEDICINE

## 2024-06-21 PROCEDURE — 99214 OFFICE O/P EST MOD 30 MIN: CPT | Performed by: FAMILY MEDICINE

## 2024-06-21 PROCEDURE — 87507 IADNA-DNA/RNA PROBE TQ 12-25: CPT | Performed by: FAMILY MEDICINE

## 2024-06-21 PROCEDURE — G2211 COMPLEX E/M VISIT ADD ON: HCPCS | Performed by: FAMILY MEDICINE

## 2024-06-21 PROCEDURE — 85025 COMPLETE CBC W/AUTO DIFF WBC: CPT | Performed by: FAMILY MEDICINE

## 2024-06-21 PROCEDURE — 82784 ASSAY IGA/IGD/IGG/IGM EACH: CPT | Performed by: FAMILY MEDICINE

## 2024-06-21 PROCEDURE — 83993 ASSAY FOR CALPROTECTIN FECAL: CPT | Performed by: FAMILY MEDICINE

## 2024-06-21 PROCEDURE — 36415 COLL VENOUS BLD VENIPUNCTURE: CPT | Performed by: FAMILY MEDICINE

## 2024-06-21 PROCEDURE — 87338 HPYLORI STOOL AG IA: CPT | Performed by: FAMILY MEDICINE

## 2024-06-21 PROCEDURE — 80053 COMPREHEN METABOLIC PANEL: CPT | Performed by: FAMILY MEDICINE

## 2024-06-21 PROCEDURE — 82306 VITAMIN D 25 HYDROXY: CPT | Performed by: FAMILY MEDICINE

## 2024-06-21 ASSESSMENT — ENCOUNTER SYMPTOMS: NAUSEA: 1

## 2024-06-21 ASSESSMENT — PAIN SCALES - GENERAL: PAINLEVEL: NO PAIN (0)

## 2024-06-21 NOTE — RESULT ENCOUNTER NOTE
Cyndie Major,    If you have not viewed these results on [employer] within 3 days, we will use an alternative method to contact you. We will contact you via the following protocol:    - Via letter if your results are normal.  - Via phone ([phone]) if your results are abnormal.     Here are my comments about your recent results:    CBC Results - Your cell counts were normal.    Please call the clinic ([phone]), or message us on [employer] with any questions you may have.     Have a great day,    Dr. Saucedo

## 2024-06-21 NOTE — RESULT ENCOUNTER NOTE
Cyndie Major,    If you have not viewed these results on TORCH[employer]sh within 3 days, we will use an alternative method to contact you. We will contact you via the following protocol:    - Via letter if your results are normal.  - Via phone ([phone]) if your results are abnormal.     Here are my comments about your recent results:    ESR - One of your inflammatory marker lab results was normal.    Please call the clinic ([phone]), or message us on [employer] with any questions you may have.     Have a great day,    Dr. Saucedo

## 2024-06-21 NOTE — RESULT ENCOUNTER NOTE
Cyndie Major,    If you have not viewed these results on [employer] within 3 days, we will use an alternative method to contact you. We will contact you via the following protocol:    - Via letter if your results are normal.  - Via phone ([phone]) if your results are abnormal.     Here are my comments about your recent results:    Vitamin B12 - Your Vitamin B12 level was normal.    CRP - Your other inflammatory lab marker result was normal.    Lipase - A pancreas test was normal.    CMP Results - Your blood sugar was normal. Your kidney function, electrolytes, and liver function were normal.    Vitamin D - Your vitamin D level was normal.    TSH - Your thyroid lab results were normal.    Please call the clinic ([phone]), or message us on [employer] with any questions you may have.     Have a great day,    Dr. Saucedo

## 2024-06-21 NOTE — PROGRESS NOTES
"  Assessment & Plan   1. Nausea  2. Fatigue, unspecified type  Reports of ongoing nausea and extreme fatigue for the past three months. Feels full all the time and bloated. No recent diet changes, illnesses, or life changes that could contribute to sudden change in energy and bowel habits. Labs ordered to look for evidence of ulcerative colitis irritable bowel syndrome vs celiac disease vs pancreatitis, etc. CT of Abdomen ordered. ordered to rule out structural abnormalities vs inflammation vs ulcer vs other. Gastroenterology consult placed for further management and testing if initial testing presents any abnormalities. Patient in agreement to plan.     - Comprehensive metabolic panel (BMP + Alb, Alk Phos, ALT, AST, Total. Bili, TP); Future  - CT Abdomen Pelvis w Contrast; Future  - Adult GI  Referral - Consult Only; Future  - Vitamin B12; Future  - Vitamin D Deficiency; Future  - ESR: Erythrocyte sedimentation rate; Future  - CRP, inflammation; Future  - TSH with free T4 reflex; Future  - Helicobacter pylori Antigen Stool; Future  - Calprotectin Feces; Future  - Adult GI  Referral - Procedure Only; Future  - Comprehensive metabolic panel (BMP + Alb, Alk Phos, ALT, AST, Total. Bili, TP)  - Vitamin B12  - Vitamin D Deficiency  - ESR: Erythrocyte sedimentation rate  - CRP, inflammation  - TSH with free T4 reflex  - Helicobacter pylori Antigen Stool  - Calprotectin Feces  - CBC with platelets and differential; Future  - Tissue transglutaminase sallie IgA and IgG; Future  - IgA; Future  - Lipase; Future  - Enteric Bacteria and Virus Panel by YUAN Stool; Future  - Adult GI  Referral - Procedure Only; Future  - CBC with platelets and differential  - Tissue transglutaminase sallie IgA and IgG  - IgA  - Lipase  - Enteric Bacteria and Virus Panel by YUAN Stool          BMI  Estimated body mass index is 29.22 kg/m  as calculated from the following:    Height as of this encounter: 1.74 m (5' 8.5\").    " Weight as of this encounter: 88.5 kg (195 lb).   Weight management plan: Discussed healthy diet and exercise guidelines    Migue Martinez MD  [facility]    Disclaimer: This note consists of symbols derived from keyboarding, dictation and/or voice recognition software. As a result, there may be errors in the script that have gone undetected. Please consider this when interpreting information found in this chart.    The longitudinal plan of care for the diagnosis(es)/condition(s) as documented were addressed during this visit. Due to the added complexity in care, I will continue to support Pedrito in the subsequent management and with ongoing continuity of care.    Subjective   Pedrito is a 53 year old, presenting for the following health issues:  Nausea      6/21/2024     7:40 AM   Additional Questions   Roomed by VE     PARKER;  Reports about 3 months ago he woke up one day very tired and some abdominal discomfort that has not subsided and worsened. Reports by the end of the day he feels full and that items in his stomach are backing up. Bloating discomfort at night is limiting his quality of sleep. Feels his energy level limits his quality of life. Reports his whole life he would have 2-3 normal stools a day. Since waking up that one morning three months ago he states he only has one stool a day that is loose and represents the [place] Stool Type 6. Uncomfortable defecation when he does go to the bathroom. Recently had a normal coloscopy in 04/18/2023 and no history of GERD.     No hematochezia, melena. No obvious food association.      History of Present Illness       Reason for visit:  Digestion issues  Symptom onset:  More than a month  Symptoms include:  Slowed digestion; low-level nausea; non-solid stool; constipation; bloating; low energy; forgetfulness  Symptom intensity:  Moderate  Symptom progression:  Staying the same  Had these symptoms before:  No  What makes it worse:  " Eating a lotHe consumes 1 sweetened beverage(s) daily. He exercises with enough effort to increase his heart rate 4 days per week.   He is taking medications regularly.       Review of Systems  Constitutional, HEENT, cardiovascular, pulmonary, gi and gu systems are negative, except as otherwise noted.  Social history reviewed.       Objective    /70 (BP Location: Left arm, Patient Position: Chair, Cuff Size: Adult Regular)   Pulse 54   Temp 97.5  F (36.4  C) (Temporal)   Resp 16   Ht 1.74 m (5' 8.5\")   Wt 88.5 kg (195 lb)   SpO2 97%   BMI 29.22 kg/m    Body mass index is 29.22 kg/m .  Physical Exam  Constitutional:       Appearance: Normal appearance. He is normal weight.   HENT:      Head: Normocephalic.      Nose: Nose normal.      Mouth/Throat:      Mouth: Mucous membranes are moist.      Pharynx: Oropharynx is clear.   Eyes:      Pupils: Pupils are equal, round, and reactive to light.   Cardiovascular:      Rate and Rhythm: Normal rate and regular rhythm.   Abdominal:      General: Abdomen is flat. Bowel sounds are increased.      Palpations: Abdomen is soft.      Tenderness: There is generalized abdominal tenderness.   Musculoskeletal:         General: Normal range of motion.      Cervical back: Normal range of motion.   Skin:     Capillary Refill: Capillary refill takes less than 2 seconds.   Neurological:      General: No focal deficit present.      Mental Status: He is alert and oriented to person, place, and time. Mental status is at baseline.   Psychiatric:         Mood and Affect: Mood normal.         Behavior: Behavior normal.         Thought Content: Thought content normal.         Judgment: Judgment normal.      Labs: Pending        Signed Electronically by: Migue Martinez MD    "

## 2024-06-21 NOTE — PATIENT INSTRUCTIONS
Important Takeaway Points From This Visit:     Please call [phone] to schedule your imaging study.     [email or web address]    I have placed this referral for you (GI). You should get a call to schedule this within 2 business days, or you can call to schedule at [phone].

## 2024-06-24 ENCOUNTER — MYC MEDICAL ADVICE (OUTPATIENT)
Dept: FAMILY MEDICINE | Facility: CLINIC | Age: 54
End: 2024-06-24

## 2024-06-24 LAB
H PYLORI AG STL QL IA: NEGATIVE
IGA SERPL-MCNC: 42 MG/DL (ref 84–499)
TTG IGA SER-ACNC: <0.2 U/ML
TTG IGG SER-ACNC: <0.6 U/ML

## 2024-06-24 RX ORDER — AZITHROMYCIN 500 MG/1
1000 TABLET, FILM COATED ORAL DAILY
Qty: 2 TABLET | Refills: 0 | Status: SHIPPED | OUTPATIENT
Start: 2024-06-24 | End: 2024-06-25

## 2024-06-24 NOTE — RESULT ENCOUNTER NOTE
Cyndei Major,    If you have not viewed these results on [employer] within 3 days, we will use an alternative method to contact you. We will contact you via the following protocol:    - Via letter if your results are normal.  - Via phone ([phone]) if your results are abnormal.     Here are my comments about your recent results:    Celiac disease test was normal.    Please call the clinic ([phone]), or message us on [employer] with any questions you may have.     Have a great day,    Dr. Saucedo

## 2024-06-24 NOTE — RESULT ENCOUNTER NOTE
"Cyndie Major,    If you have not viewed these results on [employer] within 3 days, we will use an alternative method to contact you. We will contact you via the following protocol:    - Via letter if your results are normal.  - Via phone ([phone]) if your results are abnormal.     Here are my comments about your recent results:    Your stool sample did come back positive for one form of infectious diarrhea. We will treat this and see if your symptoms improve. It is treated with an antibiotic called azithromycin 1g as a single dose. I have sent this to your pharmacy.    An antibody level is low. I would like to recheck this within 3 months with an additional lab test.    Please call [phone] to schedule your \"Lab only\" visit. You can also schedule this \"Lab only\" visit using [employer].      Please call the clinic ([phone]), or message us on [employer] with any questions you may have.     Have a great day,    Dr. Saucedo"

## 2024-06-24 NOTE — RESULT ENCOUNTER NOTE
Cyndie Major,    If you have not viewed these results on [employer] within 3 days, we will use an alternative method to contact you. We will contact you via the following protocol:    - Via letter if your results are normal.  - Via phone ([phone]) if your results are abnormal.     Here are my comments about your recent results:    Stool sample test for a bug that causes heart burn was normal.    Please call the clinic ([phone]), or message us on [employer] with any questions you may have.     Have a great day,    Dr. Saucedo

## 2024-06-25 LAB — CALPROTECTIN STL-MCNT: <5 MG/KG (ref 0–49.9)

## 2024-06-25 NOTE — RESULT ENCOUNTER NOTE
Cyndie Major,    If you have not viewed these results on [employer] within 3 days, we will use an alternative method to contact you. We will contact you via the following protocol:    - Via letter if your results are normal.  - Via phone ([phone]) if your results are abnormal.     Here are my comments about your recent results:    Normal test for inflammatory bowel disease.    Please call the clinic ([phone]), or message us on [employer] with any questions you may have.     Have a great day,    Dr. Saucedo

## 2024-06-26 LAB

## 2024-07-12 ENCOUNTER — HOSPITAL ENCOUNTER (OUTPATIENT)
Facility: AMBULATORY SURGERY CENTER | Age: 54
End: 2024-07-12
Attending: INTERNAL MEDICINE
Payer: COMMERCIAL

## 2024-07-12 ENCOUNTER — TELEPHONE (OUTPATIENT)
Dept: GASTROENTEROLOGY | Facility: CLINIC | Age: 54
End: 2024-07-12
Payer: COMMERCIAL

## 2024-07-12 NOTE — TELEPHONE ENCOUNTER
"Endoscopy Scheduling Screen    Have you had a positive Covid test in the last 14 days?  No    What is your communication preference for Instructions and/or Bowel Prep?   MyChart    What insurance is in the chart?  Other:  [facility]     Ordering/Referring Provider: REBECCA MAHONEY    (If ordering provider performs procedure, schedule with ordering provider unless otherwise instructed. )    BMI: Estimated body mass index is 29.22 kg/m  as calculated from the following:    Height as of 6/21/24: 1.74 m (5' 8.5\").    Weight as of 6/21/24: 88.5 kg (195 lb).     Sedation Ordered  moderate sedation.   If patient BMI > 50 do not schedule in [facility].    If patient [facility] > 45 [facility]    Are you taking methadone or Suboxone?  No    Have you had difficulties, pain, or discomfort during past endoscopy procedures?  No    Are you taking any prescription medications for pain 3 or more times per week?   NO, No RN review required.    Do you have a history of malignant hyperthermia?  No       Have you been diagnosed or told you have pulmonary hypertension?   No    Do you have an LVAD?  No    Have you been told you have moderate to severe sleep apnea?  No    Have you been told you have COPD, asthma, or any other lung disease?  No    Do you have any heart conditions?  No     Have you ever had or are you waiting for an organ transplant?  No. Continue scheduling, no site restrictions.    Have you had a stroke or transient ischemic attack (TIA aka \"mini stroke\" in the last 6 months?   No    Have you been diagnosed with or been told you have cirrhosis of the liver?   No    Are you currently on dialysis?   No    Do you need assistance transferring?   No    BMI: Estimated body mass index is 29.22 kg/m  as calculated from the following:    Height as of 6/21/24: 1.74 m (5' 8.5\").    Weight as of 6/21/24: 88.5 kg (195 lb).     Is patients BMI > 40 and scheduling location [facility]?  No    Do you take an injectable medication for weight loss " or diabetes (excluding insulin)?  No    Do you take the medication Naltrexone?  No    Do you take blood thinners?  No       Prep   Are you currently on dialysis or do you have chronic kidney disease?  No    Do you have a diagnosis of diabetes?  No    Do you have a diagnosis of cystic fibrosis (CF)?  No    On a regular basis do you go 3 -5 days between bowel movements?  No    BMI > 40?  No    Preferred Pharmacy:    [id number] - [place] - [address]  [address] [place] [address]  Phone: [phone] Fax: [phone]      Final Scheduling Details     Procedure scheduled  Upper endoscopy (EGD)    Surgeon:  RESHMA     Date of procedure:  9/4/24     Pre-OP / PAC:   No - Not required for this site.    Location  [facility] - [facility] - Patient preference.    Sedation   Moderate Sedation - Per order.      Patient Reminders:   You will receive a call from a Nurse to review instructions and health history.  This assessment must be completed prior to your procedure.  Failure to complete the Nurse assessment may result in the procedure being cancelled.      On the day of your procedure, please designate an adult(s) who can drive you home stay with you for the next 24 hours. The medicines used in the exam will make you sleepy. You will not be able to drive.      You cannot take public transportation, ride share services, or non-medical taxi service without a responsible caregiver.  Medical transport services are allowed with the requirement that a responsible caregiver will receive you at your destination.  We require that drivers and caregivers are confirmed prior to your procedure.

## 2024-07-23 ENCOUNTER — ANCILLARY PROCEDURE (OUTPATIENT)
Dept: CT IMAGING | Facility: CLINIC | Age: 54
End: 2024-07-23
Attending: FAMILY MEDICINE
Payer: COMMERCIAL

## 2024-07-23 DIAGNOSIS — R11.0 NAUSEA: ICD-10-CM

## 2024-07-23 PROCEDURE — 74177 CT ABD & PELVIS W/CONTRAST: CPT | Mod: GC | Performed by: RADIOLOGY

## 2024-07-23 RX ORDER — IOPAMIDOL 755 MG/ML
107 INJECTION, SOLUTION INTRAVASCULAR ONCE
Status: COMPLETED | OUTPATIENT
Start: 2024-07-23 | End: 2024-07-23

## 2024-07-23 RX ADMIN — IOPAMIDOL 107 ML: 755 INJECTION, SOLUTION INTRAVASCULAR at 16:44

## 2024-07-24 ENCOUNTER — PATIENT OUTREACH (OUTPATIENT)
Dept: SURGERY | Facility: CLINIC | Age: 54
End: 2024-07-24
Payer: COMMERCIAL

## 2024-07-24 DIAGNOSIS — K63.89 MESENTERIC MASS: Primary | ICD-10-CM

## 2024-07-24 LAB — RADIOLOGIST FLAGS: NORMAL

## 2024-07-24 NOTE — TELEPHONE ENCOUNTER
RECORDS STATUS - ALL OTHER DIAGNOSIS      RECORDS RECEIVED FROM: [facility] - Internal records   DATE RECEIVED: 7/24

## 2024-07-24 NOTE — PROGRESS NOTES
Reviewed CT scan results with patient. Discussed that this is cancer until proven otherwise. Urgent oncology referral placed to review with patient and coordinate next steps (scans to determine most appropriate biopsy for staging, pathology orders for treatment markers etc).    Patient understands. No questions. Feel free to contact me if any questions arise.    Migue Martinez MD

## 2024-07-24 NOTE — PROGRESS NOTES
New Patient Oncology Nurse Navigator Note     Referring provider: Dr. Saucedo    Referring Clinic/Organization: [facility]     Referred to: Surgical Oncology      Requested provider (if applicable): First available provider    Referral Received: 07/24/24       Evaluation for :  Mesenteric mass, concerning for malignancy      Clinical History (per Nurse review of records provided):      See book marked documents:     Referring MD office note  Imaging reports       No Known Allergies     Past Medical History:   Diagnosis Date    NO ACTIVE PROBLEMS        Past Surgical History:   Procedure Laterality Date    COLONOSCOPY WITH CO2 INSUFFLATION N/A 04/18/2023    Procedure: COLONOSCOPY, WITH CO2 INSUFFLATION;  Surgeon: Terrence Cole MD;  Location: [facility] OR    VASECTOMY  2010    Dr. Narayanan       No current outpatient medications on file.        Patient Active Problem List   Diagnosis    CARDIOVASCULAR SCREENING; LDL GOAL LESS THAN 160         Clinical Assessment / Barriers to Care (Per Nurse):    None at this time.     Records Location:     [facility] Everywhere     Records Needed:     ABDOMINAL IMAGING (CT SCANS, MRI, US, PET SCANS)  DATING BACK TO 2018      Additional testing needed prior to consult:     NONE AT THIS TIME    Referral updates and Plan:       Consult with Surgical Oncology     07/24/2024 10:28 AM - called and spoke with patient regarding recommendations to meet with surgical oncology to discuss treatment of mesenteric mass, discussion options for appointment and confirmed ok to proceed with virtual visit tomorrow.     Patient was transferred to scheduling to finalize appointment details.     Aimee Beckwith, RN, BSN   Surgical Oncology New Patient Nurse Navigator  [facility]  [phone]

## 2024-07-25 ENCOUNTER — VIRTUAL VISIT (OUTPATIENT)
Dept: ONCOLOGY | Facility: CLINIC | Age: 54
End: 2024-07-25
Attending: FAMILY MEDICINE
Payer: COMMERCIAL

## 2024-07-25 ENCOUNTER — PRE VISIT (OUTPATIENT)
Dept: ONCOLOGY | Facility: CLINIC | Age: 54
End: 2024-07-25
Payer: COMMERCIAL

## 2024-07-25 VITALS — HEIGHT: 68 IN | BODY MASS INDEX: 28.79 KG/M2 | WEIGHT: 190 LBS

## 2024-07-25 DIAGNOSIS — K63.89 MESENTERIC MASS: ICD-10-CM

## 2024-07-25 PROCEDURE — 99203 OFFICE O/P NEW LOW 30 MIN: CPT | Mod: 95 | Performed by: SURGERY

## 2024-07-25 ASSESSMENT — PAIN SCALES - GENERAL: PAINLEVEL: NO PAIN (0)

## 2024-07-25 NOTE — NURSING NOTE
Is the patient currently in the state of [place]? YES    Visit mode:VIDEO    If the visit is dropped, the patient can be reconnected by: VIDEO VISIT: Send to e-mail at: [email or web address]    Will anyone else be joining the visit? NO  (If patient encounters technical issues they should call [phone]    How would you like to obtain your AVS? MyChart    Are changes needed to the allergy or medication list? No      Reason for visit: Video Visit (New apt )    Vicki DRAKE

## 2024-07-25 NOTE — PROGRESS NOTES
Patient is a 53-year-old man with a new diagnosis of a abdominal mass.  He was in his usual health but has had some nausea and fatigue over the past 3 months.  A CT was obtained on July 23 which demonstrated a 5.3 cm mass in the mid abdomen.  The mass is surrounded by some haziness in the mesentery.  I do not see the usual desmoplastic type reaction with a carcinoid tumor.  I do not see any other masses in the small bowel.  I do not see any other enlarged lymph nodes.  He does not have any symptoms of fevers or night sweats.  He had a colonoscopy a year ago that was normal.  He is otherwise healthy with no major medical problems.    Impression: Mesenteric mass    Plan: I talked about the differential diagnosis which could include a benign growth, carcinoid tumor, lymphoma, or sarcoma.  These diagnoses are all treated differently.  I have recommended a laparoscopic and possible open biopsy of this mass.  This will be scheduled as soon as possible.    The video started at 9:14 AM and ended at 9:28 AM the total time of the visit was 30 minutes which included reviewing his imaging the video visit and coordinating his care

## 2024-07-25 NOTE — LETTER
7/25/2024      Pedrito Jenkins  [address] [place] [address]      Dear Colleague,    Thank you for referring your patient, Pedrito Jenkins, to the [facility]. Please see a copy of my visit note below.    Virtual Visit Details    Patient is a 53-year-old man with a new diagnosis of a abdominal mass.  He was in his usual health but has had some nausea and fatigue over the past 3 months.  A CT was obtained on July 23 which demonstrated a 5.3 cm mass in the mid abdomen.  The mass is surrounded by some haziness in the mesentery.  I do not see the usual desmoplastic type reaction with a carcinoid tumor.  I do not see any other masses in the small bowel.  I do not see any other enlarged lymph nodes.  He does not have any symptoms of fevers or night sweats.  He had a colonoscopy a year ago that was normal.  He is otherwise healthy with no major medical problems.    Impression: Mesenteric mass    Plan: I talked about the differential diagnosis which could include a benign growth, carcinoid tumor, lymphoma, or sarcoma.  These diagnoses are all treated differently.  I have recommended a laparoscopic and possible open biopsy of this mass.  This will be scheduled as soon as possible.    The video started at 9:14 AM and ended at 9:28 AM the total time of the visit was 30 minutes which included reviewing his imaging the video visit and coordinating his care    Sincerely,        Charly Werner MD

## 2024-07-26 ENCOUNTER — PATIENT OUTREACH (OUTPATIENT)
Dept: ONCOLOGY | Facility: CLINIC | Age: 54
End: 2024-07-26
Payer: COMMERCIAL

## 2024-07-26 ENCOUNTER — TRANSCRIBE ORDERS (OUTPATIENT)
Dept: OTHER | Age: 54
End: 2024-07-26

## 2024-07-26 DIAGNOSIS — K63.89 MESENTERIC MASS: Primary | ICD-10-CM

## 2024-07-26 NOTE — PROGRESS NOTES
[facility]: Surgical Oncology Plan of Care Education Note                                     Discussion with Patient:                                                       Spoke with Kamran following his visit with Dr. Werner on 7/25/2024. Introduced self and explained role of RNCC.       Plan:                                                       Reviewed plan for laparoscopic, possible open, abdominal biopsy at the ASC. Provided appropriate OR location map. Discussed need for H&P within 30 days of surgery. Kamran prefers to schedule with his PCP. Reviewed shower instructions and mailed written hand-out and bottle of surgical scrub.     Informed patient they should get a call within the next three business days from our OR  with surgery date, H&P plan and date of post-op visit with their surgeon. Writer answered all questions and concerns to the best of her ability and provided her contact information. Reviewed use of [employer] as alternative way to contact team.  Encouraged patient to contact with questions.         Sheila Rodriguez, RNCC  [facility]  Surgical Onolcogy      Approximately 10 minutes was spent in discussion with patient.

## 2024-07-29 ENCOUNTER — OFFICE VISIT (OUTPATIENT)
Dept: FAMILY MEDICINE | Facility: CLINIC | Age: 54
End: 2024-07-29
Payer: COMMERCIAL

## 2024-07-29 ENCOUNTER — TELEPHONE (OUTPATIENT)
Dept: ONCOLOGY | Facility: CLINIC | Age: 54
End: 2024-07-29
Payer: COMMERCIAL

## 2024-07-29 VITALS
HEART RATE: 59 BPM | DIASTOLIC BLOOD PRESSURE: 84 MMHG | WEIGHT: 196.3 LBS | BODY MASS INDEX: 29.07 KG/M2 | RESPIRATION RATE: 16 BRPM | OXYGEN SATURATION: 98 % | TEMPERATURE: 97.4 F | SYSTOLIC BLOOD PRESSURE: 132 MMHG | HEIGHT: 69 IN

## 2024-07-29 DIAGNOSIS — K63.89 MESENTERIC MASS: ICD-10-CM

## 2024-07-29 DIAGNOSIS — Z01.818 PREOP GENERAL PHYSICAL EXAM: Primary | ICD-10-CM

## 2024-07-29 DIAGNOSIS — Z23 NEED FOR TDAP VACCINATION: ICD-10-CM

## 2024-07-29 PROCEDURE — 90471 IMMUNIZATION ADMIN: CPT | Performed by: PHYSICIAN ASSISTANT

## 2024-07-29 PROCEDURE — 99214 OFFICE O/P EST MOD 30 MIN: CPT | Mod: 25 | Performed by: PHYSICIAN ASSISTANT

## 2024-07-29 PROCEDURE — 90715 TDAP VACCINE 7 YRS/> IM: CPT | Performed by: PHYSICIAN ASSISTANT

## 2024-07-29 ASSESSMENT — PAIN SCALES - GENERAL: PAINLEVEL: NO PAIN (0)

## 2024-07-29 NOTE — PATIENT INSTRUCTIONS

## 2024-07-29 NOTE — TELEPHONE ENCOUNTER
Surgery moved to [place] vs [facility] due to being listed as possible open. Not approved for [facility] if open case. Date remains the same. Updated patient on location change.     Carolyn Baez on 7/29/2024 at 12:53 PM

## 2024-07-29 NOTE — PROGRESS NOTES
Preoperative Evaluation  [facility] [place]  [address]  [place] [address]  Phone: [phone]  Fax: [phone]  Primary Provider: Migue Martinez MD  Pre-op Performing Provider: Amelia Mcgee PA-C  Jul 29, 2024 7/29/2024   Surgical Information   What procedure is being done? LAPAROSCOPIC, POSSIBLE OPEN, ABDOMINAL BIOPSY    Facility or Hospital where procedure/surgery will be performed: [facility]    Who is doing the procedure / surgery? Charly Werner MD    Date of surgery / procedure: August 6   Time of surgery / procedure: TBD   Where do you plan to recover after surgery? at home with family      Fax number for surgical facility: Note does not need to be faxed, will be available electronically in Epic.    Assessment & Plan     The proposed surgical procedure is considered INTERMEDIATE risk.    Preop general physical exam  Mesenteric mass  Pt does not have heart disease, arrhythmia history, diabetes on insulin, CKD or PVD.   Pt can exercise >4 METs , no CV sx at rest or with exertion.  Chronic conditions are stable  Surgery as scheduled.   Labs ordered below or as indicated.   Reviewed preop info in AVS with pt including NPO, showering, medication recommendations, indications to delay/schedule (ie new illness s/sx). Pt questions answered.    Need for Tdap vaccination  Given today   - TDAP 10-64Y (ADACEL,BOOSTRIX)          - No identified additional risk factors other than previously addressed    Preoperative Medication Instructions  Antiplatelet or Anticoagulation Medication Instructions   - Patient is on no antiplatelet or anticoagulation medications.    Additional Medication Instructions  Patient is on no additional chronic medications   - Herbal medications and vitamins: DO NOT TAKE 14 days prior to surgery.    Recommendation  Approval given to proceed with proposed procedure, without further  diagnostic evaluation.    Follow Up: see above. Additionally patient was instructed to contact clinic for worsening symptoms, non-improvement in time frame discussed, and for questions regarding treatment plan.   For virtual visits, the patient was advised to be seen for in person evaluation if symptoms or condition are worsening or non-improvement as expected.   Amelia Mcgee PA-C      Winifred Major is a 53 year old, presenting for the following:  Pre-Op Exam          7/29/2024     2:35 PM   Additional Questions   Roomed by Ginger MALDONADO   Accompanied by Zain         7/29/2024     2:35 PM   Patient Reported Additional Medications   Patient reports taking the following new medications NA     HPI related to upcoming procedure: was having nausea, no pain. As part of evaluation had CT showing central abdominal mass with mesenteric lymphadenopathy.         7/29/2024   Pre-Op Questionnaire   Have you ever had a heart attack or stroke? No   Have you ever had surgery on your heart or blood vessels, such as a stent placement, a coronary artery bypass, or surgery on an artery in your head, neck, heart, or legs? No   Do you have chest pain with activity? No   Do you have a history of heart failure? No   Do you currently have a cold, bronchitis or symptoms of other infection? No   Do you have a cough, shortness of breath, or wheezing? No   Do you or anyone in your family have previous history of blood clots? No   Do you or does anyone in your family have a serious bleeding problem such as prolonged bleeding following surgeries or cuts? No   Have you ever had problems with anemia or been told to take iron pills? No   Have you had any abnormal blood loss such as black, tarry or bloody stools? No   Have you ever had a blood transfusion? No   Are you willing to have a blood transfusion if it is medically needed before, during, or after your surgery? Yes   Have you or any of your relatives ever had problems with anesthesia? No    Do you have sleep apnea, excessive snoring or daytime drowsiness? No   Do you have any artifical heart valves or other implanted medical devices like a pacemaker, defibrillator, or continuous glucose monitor? No   Do you have artificial joints? No   Are you allergic to latex? No        Health Care Directive  Patient does not have a Health Care Directive or Living Will: Discussed advance care planning with patient; however, patient declined at this time.    Preoperative Review of    reviewed - no record of controlled substances prescribed.      For exercise: running multiple times a week a few miles per day; feels good with exercise. Limitations with exercise due to: nothing. Denies CV sxs with exercise including CP, SOB, palpitations, OLEARY, presyncope.      Status of Chronic Conditions:  See problem list for active medical problems.  Problems all longstanding and stable, except as noted/documented.  See ROS for pertinent symptoms related to these conditions.    Patient Active Problem List    Diagnosis Date Noted    Mesenteric mass 07/25/2024     Priority: Medium    CARDIOVASCULAR SCREENING; LDL GOAL LESS THAN 160 10/31/2010     Priority: Medium      Past Medical History:   Diagnosis Date    NO ACTIVE PROBLEMS      Past Surgical History:   Procedure Laterality Date    COLONOSCOPY WITH CO2 INSUFFLATION N/A 04/18/2023    Procedure: COLONOSCOPY, WITH CO2 INSUFFLATION;  Surgeon: Terrence Cole MD;  Location: [facility] OR    VASECTOMY  2010    Dr. Narayanan     No current outpatient medications on file.       No Known Allergies     Social History     Tobacco Use    Smoking status: Never     Passive exposure: Never    Smokeless tobacco: Never   Substance Use Topics    Alcohol use: Yes     Alcohol/week: 2.0 - 3.0 standard drinks of alcohol     Types: 2 - 3 Standard drinks or equivalent per week     Comment: Couple times/month     Family History   Problem Relation Age of Onset    Parkinsonism Father     Hypertension No family  "hx of     Coronary Artery Disease No family hx of     Cerebrovascular Disease No family hx of     Diabetes No family hx of     Cancer No family hx of      History   Drug Use No             Review of Systems  CONSTITUTIONAL: NEGATIVE for fever, chills, change in weight  INTEGUMENTARY/SKIN: NEGATIVE for worrisome rashes, moles or lesions  EYES: NEGATIVE for vision changes or irritation  ENT/MOUTH: NEGATIVE for ear, mouth and throat problems  RESP: NEGATIVE for significant cough or SOB  BREAST: NEGATIVE for masses, tenderness or discharge  CV: NEGATIVE for chest pain, palpitations or peripheral edema  GI: POSITIVE for nausea and irregular BMs and NEGATIVE for heartburn or reflux and vomiting  : NEGATIVE for frequency, dysuria, or hematuria  MUSCULOSKELETAL: NEGATIVE for significant arthralgias or myalgia  NEURO: NEGATIVE for weakness, dizziness or paresthesias  ENDOCRINE: NEGATIVE for temperature intolerance, skin/hair changes  HEME: NEGATIVE for bleeding problems  PSYCHIATRIC: NEGATIVE for changes in mood or affect    Objective    /84   Pulse 59   Temp 97.4  F (36.3  C) (Temporal)   Resp 16   Ht 1.743 m (5' 8.62\")   Wt 89 kg (196 lb 4.8 oz)   SpO2 98%   BMI 29.31 kg/m     Estimated body mass index is 29.31 kg/m  as calculated from the following:    Height as of this encounter: 1.743 m (5' 8.62\").    Weight as of this encounter: 89 kg (196 lb 4.8 oz).  Physical Exam  GENERAL: alert and no distress  EYES: Eyes grossly normal to inspection, PERRL and conjunctivae and sclerae normal  HENT: ear canals and TM's normal, nose and mouth without ulcers or lesions  NECK: no adenopathy, no asymmetry, masses, or scars  RESP: lungs clear to auscultation - no rales, rhonchi or wheezes  CV: regular rate and rhythm, normal S1 S2, no S3 or S4, no murmur, click or rub, no peripheral edema  ABDOMEN: soft, nontender, no hepatosplenomegaly, no masses and bowel sounds normal  MS: no gross musculoskeletal defects noted, no " edema  NEURO: Normal strength and tone, mentation intact and speech normal  PSYCH: mentation appears normal, affect normal/bright    Recent Labs   Lab Test 06/21/24  0853 01/29/24  0803   HGB 14.6 15.0    216    140   POTASSIUM 4.4 4.4   CR 1.04 1.09   A1C  --  5.7*        Diagnostics  No labs were ordered during this visit.   No EKG required, no history of coronary heart disease, significant arrhythmia, peripheral arterial disease or other structural heart disease.    Revised Cardiac Risk Index (RCRI)  The patient has the following serious cardiovascular risks for perioperative complications:   - No serious cardiac risks = 0 points     RCRI Interpretation: 0 points: Class I (very low risk - 0.4% complication rate)         Signed Electronically by: Amelia Mcgee PA-C  A copy of this evaluation report is provided to the requesting physician.

## 2024-07-29 NOTE — TELEPHONE ENCOUNTER
Called patient to schedule surgery with Dr. Werner    Spoke with:  Patient     Date of Surgery: 8/6/24    Arrival time Discussed with Patient:  No    Location of surgery: [facility]     Pre-Op H&P: Will be completed with PCP Dr. Martinez    Post-Op Appts: Patient will need post-op but clinic is 100% full and provider on vacation. Will ask clinic for assistance with post-op.     Imaging:  No - N/A  Scheduled: N/A     Discussed with patient pre-op RN will call 2-3 days prior to surgery with arrival time and instructions:  Yes     Packet sent out: Yes  via [employer] [DATE] 7/29/24    Surgical Soap: Receiving via local pharmacy      Informed patient that they will need an adult  to bring patient home from surgery: Yes  : Wife       Additional Comments:  N/A      All patients questions were answered and patient was instructed to review surgical packet and call back with any questions or concerns.       Carolyn Baez on 7/29/2024 at 10:03 AM

## 2024-08-05 ENCOUNTER — ANESTHESIA EVENT (OUTPATIENT)
Dept: SURGERY | Facility: CLINIC | Age: 54
End: 2024-08-05
Payer: COMMERCIAL

## 2024-08-05 NOTE — ANESTHESIA PREPROCEDURE EVALUATION
Anesthesia Pre-Procedure Evaluation    Patient: Pedrito Jenkins   MRN: [id number] : 1970        Procedure : Procedure(s):  LAPAROSCOPIC, POSSIBLE OPEN, ABDOMINAL BIOPSY          Past Medical History:   Diagnosis Date     NO ACTIVE PROBLEMS       Past Surgical History:   Procedure Laterality Date     COLONOSCOPY WITH CO2 INSUFFLATION N/A 2023    Procedure: COLONOSCOPY, WITH CO2 INSUFFLATION;  Surgeon: Terrence Cole MD;  Location: [facility]     VASECTOMY      Dr. Narayanan      No Known Allergies   Social History     Tobacco Use     Smoking status: Never     Passive exposure: Never     Smokeless tobacco: Never   Substance Use Topics     Alcohol use: Yes     Alcohol/week: 2.0 - 3.0 standard drinks of alcohol     Types: 2 - 3 Standard drinks or equivalent per week     Comment: Couple times/month      Wt Readings from Last 1 Encounters:   24 89 kg (196 lb 4.8 oz)        Anesthesia Evaluation   Pt has had prior anesthetic. Type: General and MAC.        ROS/MED HX  ENT/Pulmonary:  - neg pulmonary ROS     Neurologic:  - neg neurologic ROS     Cardiovascular:  - neg cardiovascular ROS     METS/Exercise Tolerance: >4 METS    Hematologic:  - neg hematologic  ROS     Musculoskeletal:  - neg musculoskeletal ROS     GI/Hepatic:  - neg GI/hepatic ROS     Renal/Genitourinary:       Endo: Comment: Prediabetic       Psychiatric/Substance Use:  - neg psychiatric ROS     Infectious Disease:  - neg infectious disease ROS     Malignancy:       Other: Comment: Mesenteric mass           Physical Exam    Airway        Mallampati: II   TM distance: > 3 FB   Neck ROM: full   Mouth opening: > 3 cm    Respiratory Devices and Support         Dental       (+) Minor Abnormalities - some fillings, tiny chips      Cardiovascular   cardiovascular exam normal       Rhythm and rate: regular     Pulmonary   pulmonary exam normal        breath sounds clear to auscultation       OUTSIDE LABS:  CBC:   Lab Results   Component Value Date  "   WBC 5.9 06/21/2024    WBC 6.4 01/29/2024    HGB 14.6 06/21/2024    HGB 15.0 01/29/2024    HCT 42.4 06/21/2024    HCT 43.2 01/29/2024     06/21/2024     01/29/2024     BMP:   Lab Results   Component Value Date     06/21/2024     01/29/2024    POTASSIUM 4.4 06/21/2024    POTASSIUM 4.4 01/29/2024    CHLORIDE 105 06/21/2024    CHLORIDE 104 01/29/2024    CO2 25 06/21/2024    CO2 25 01/29/2024    BUN 14.9 06/21/2024    BUN 17.6 01/29/2024    CR 1.04 06/21/2024    CR 1.09 01/29/2024    GLC 97 06/21/2024     (H) 01/29/2024     COAGS: No results found for: \"PTT\", \"INR\", \"FIBR\"  POC: No results found for: \"BGM\", \"HCG\", \"HCGS\"  HEPATIC:   Lab Results   Component Value Date    ALBUMIN 4.5 06/21/2024    PROTTOTAL 6.6 06/21/2024    ALT 17 06/21/2024    AST 24 06/21/2024    ALKPHOS 93 06/21/2024    BILITOTAL 0.5 06/21/2024     OTHER:   Lab Results   Component Value Date    A1C 5.7 (H) 01/29/2024    NEY 9.7 06/21/2024    LIPASE 41 06/21/2024    TSH 2.17 06/21/2024    SED 4 06/21/2024       Anesthesia Plan    ASA Status:  1    NPO Status:  NPO Appropriate    Anesthesia Type: General.     - Airway: ETT   Induction: Intravenous.   Maintenance: Inhalation.        Consents    Anesthesia Plan(s) and associated risks, benefits, and realistic alternatives discussed. Questions answered and patient/representative(s) expressed understanding.     - Discussed:     - Discussed with:  Patient      - Extended Intubation/Ventilatory Support Discussed: No.      - Patient is DNR/DNI Status: No     Use of blood products discussed: No .     Postoperative Care    Pain management: IV analgesics, Oral pain medications.   PONV prophylaxis: Ondansetron (or other 5HT-3), Scopolamine patch, Dexamethasone or Solumedrol     Comments:               Stephanie Nolan MD    I have reviewed the pertinent notes and labs in the chart from the past 30 days and (re)examined the patient.  Any updates or changes from those notes are " "reflected in this note.              # Overweight: Estimated body mass index is 29.31 kg/m  as calculated from the following:    Height as of 7/29/24: 1.743 m (5' 8.62\").    Weight as of 7/29/24: 89 kg (196 lb 4.8 oz).      "

## 2024-08-06 ENCOUNTER — ANESTHESIA (OUTPATIENT)
Dept: SURGERY | Facility: CLINIC | Age: 54
End: 2024-08-06
Payer: COMMERCIAL

## 2024-08-06 ENCOUNTER — HOSPITAL ENCOUNTER (OUTPATIENT)
Facility: CLINIC | Age: 54
Discharge: HOME | End: 2024-08-06
Attending: SURGERY | Admitting: SURGERY
Payer: COMMERCIAL

## 2024-08-06 VITALS
SYSTOLIC BLOOD PRESSURE: 114 MMHG | TEMPERATURE: 97.6 F | BODY MASS INDEX: 29.74 KG/M2 | WEIGHT: 196.21 LBS | HEIGHT: 68 IN | HEART RATE: 61 BPM | OXYGEN SATURATION: 97 % | RESPIRATION RATE: 18 BRPM | DIASTOLIC BLOOD PRESSURE: 78 MMHG

## 2024-08-06 DIAGNOSIS — K63.89 MESENTERIC MASS: Primary | ICD-10-CM

## 2024-08-06 PROCEDURE — 88189 FLOWCYTOMETRY/READ 16 & >: CPT | Performed by: STUDENT IN AN ORGANIZED HEALTH CARE EDUCATION/TRAINING PROGRAM

## 2024-08-06 PROCEDURE — 88342 IMHCHEM/IMCYTCHM 1ST ANTB: CPT | Mod: 26 | Performed by: STUDENT IN AN ORGANIZED HEALTH CARE EDUCATION/TRAINING PROGRAM

## 2024-08-06 PROCEDURE — 88185 FLOWCYTOMETRY/TC ADD-ON: CPT | Performed by: SURGERY

## 2024-08-06 PROCEDURE — 999N000141 HC STATISTIC PRE-PROCEDURE NURSING ASSESSMENT: Performed by: SURGERY

## 2024-08-06 PROCEDURE — 250N000009 HC RX 250: Performed by: ANESTHESIOLOGY

## 2024-08-06 PROCEDURE — 49321 LAPAROSCOPY BIOPSY: CPT | Performed by: ANESTHESIOLOGY

## 2024-08-06 PROCEDURE — 88365 INSITU HYBRIDIZATION (FISH): CPT | Mod: 26 | Performed by: STUDENT IN AN ORGANIZED HEALTH CARE EDUCATION/TRAINING PROGRAM

## 2024-08-06 PROCEDURE — 360N000076 HC SURGERY LEVEL 3, PER MIN: Performed by: SURGERY

## 2024-08-06 PROCEDURE — 250N000011 HC RX IP 250 OP 636: Performed by: STUDENT IN AN ORGANIZED HEALTH CARE EDUCATION/TRAINING PROGRAM

## 2024-08-06 PROCEDURE — 88305 TISSUE EXAM BY PATHOLOGIST: CPT | Mod: 26 | Performed by: STUDENT IN AN ORGANIZED HEALTH CARE EDUCATION/TRAINING PROGRAM

## 2024-08-06 PROCEDURE — 250N000009 HC RX 250: Performed by: NURSE ANESTHETIST, CERTIFIED REGISTERED

## 2024-08-06 PROCEDURE — 272N000001 HC OR GENERAL SUPPLY STERILE: Performed by: SURGERY

## 2024-08-06 PROCEDURE — 370N000017 HC ANESTHESIA TECHNICAL FEE, PER MIN: Performed by: SURGERY

## 2024-08-06 PROCEDURE — 250N000025 HC SEVOFLURANE, PER MIN: Performed by: SURGERY

## 2024-08-06 PROCEDURE — 88341 IMHCHEM/IMCYTCHM EA ADD ANTB: CPT | Mod: 26 | Performed by: STUDENT IN AN ORGANIZED HEALTH CARE EDUCATION/TRAINING PROGRAM

## 2024-08-06 PROCEDURE — 710N000010 HC RECOVERY PHASE 1, LEVEL 2, PER MIN: Performed by: SURGERY

## 2024-08-06 PROCEDURE — 710N000012 HC RECOVERY PHASE 2, PER MINUTE: Performed by: SURGERY

## 2024-08-06 PROCEDURE — 250N000011 HC RX IP 250 OP 636: Performed by: SURGERY

## 2024-08-06 PROCEDURE — 88360 TUMOR IMMUNOHISTOCHEM/MANUAL: CPT | Mod: TC | Performed by: SURGERY

## 2024-08-06 PROCEDURE — 250N000013 HC RX MED GY IP 250 OP 250 PS 637: Performed by: STUDENT IN AN ORGANIZED HEALTH CARE EDUCATION/TRAINING PROGRAM

## 2024-08-06 PROCEDURE — 88360 TUMOR IMMUNOHISTOCHEM/MANUAL: CPT | Mod: 26 | Performed by: STUDENT IN AN ORGANIZED HEALTH CARE EDUCATION/TRAINING PROGRAM

## 2024-08-06 PROCEDURE — 49321 LAPAROSCOPY BIOPSY: CPT | Performed by: NURSE ANESTHETIST, CERTIFIED REGISTERED

## 2024-08-06 PROCEDURE — 258N000003 HC RX IP 258 OP 636: Performed by: NURSE ANESTHETIST, CERTIFIED REGISTERED

## 2024-08-06 PROCEDURE — 250N000011 HC RX IP 250 OP 636: Performed by: NURSE ANESTHETIST, CERTIFIED REGISTERED

## 2024-08-06 PROCEDURE — 49321 LAPAROSCOPY BIOPSY: CPT | Performed by: SURGERY

## 2024-08-06 PROCEDURE — 88184 FLOWCYTOMETRY/ TC 1 MARKER: CPT | Performed by: STUDENT IN AN ORGANIZED HEALTH CARE EDUCATION/TRAINING PROGRAM

## 2024-08-06 RX ORDER — ONDANSETRON 2 MG/ML
INJECTION INTRAMUSCULAR; INTRAVENOUS PRN
Status: DISCONTINUED | OUTPATIENT
Start: 2024-08-06 | End: 2024-08-06

## 2024-08-06 RX ORDER — ONDANSETRON 4 MG/1
4 TABLET, ORALLY DISINTEGRATING ORAL
Status: DISCONTINUED | OUTPATIENT
Start: 2024-08-06 | End: 2024-08-06 | Stop reason: HOSPADM

## 2024-08-06 RX ORDER — ACETAMINOPHEN 325 MG/1
650 TABLET ORAL
Status: DISCONTINUED | OUTPATIENT
Start: 2024-08-06 | End: 2024-08-06 | Stop reason: HOSPADM

## 2024-08-06 RX ORDER — CEFAZOLIN SODIUM/WATER 2 G/20 ML
2 SYRINGE (ML) INTRAVENOUS
Status: COMPLETED | OUTPATIENT
Start: 2024-08-06 | End: 2024-08-06

## 2024-08-06 RX ORDER — FENTANYL CITRATE 50 UG/ML
50 INJECTION, SOLUTION INTRAMUSCULAR; INTRAVENOUS EVERY 5 MIN PRN
Status: DISCONTINUED | OUTPATIENT
Start: 2024-08-06 | End: 2024-08-06 | Stop reason: HOSPADM

## 2024-08-06 RX ORDER — BUPIVACAINE HYDROCHLORIDE 2.5 MG/ML
INJECTION, SOLUTION INFILTRATION; PERINEURAL PRN
Status: DISCONTINUED | OUTPATIENT
Start: 2024-08-06 | End: 2024-08-06 | Stop reason: HOSPADM

## 2024-08-06 RX ORDER — ACETAMINOPHEN 325 MG/1
650 TABLET ORAL EVERY 4 HOURS PRN
COMMUNITY
Start: 2024-08-06

## 2024-08-06 RX ORDER — SODIUM CHLORIDE, SODIUM LACTATE, POTASSIUM CHLORIDE, CALCIUM CHLORIDE 600; 310; 30; 20 MG/100ML; MG/100ML; MG/100ML; MG/100ML
INJECTION, SOLUTION INTRAVENOUS CONTINUOUS PRN
Status: DISCONTINUED | OUTPATIENT
Start: 2024-08-06 | End: 2024-08-06

## 2024-08-06 RX ORDER — NALOXONE HYDROCHLORIDE 0.4 MG/ML
0.1 INJECTION, SOLUTION INTRAMUSCULAR; INTRAVENOUS; SUBCUTANEOUS
Status: DISCONTINUED | OUTPATIENT
Start: 2024-08-06 | End: 2024-08-06 | Stop reason: HOSPADM

## 2024-08-06 RX ORDER — PROPOFOL 10 MG/ML
INJECTION, EMULSION INTRAVENOUS PRN
Status: DISCONTINUED | OUTPATIENT
Start: 2024-08-06 | End: 2024-08-06

## 2024-08-06 RX ORDER — CEFAZOLIN SODIUM/WATER 2 G/20 ML
2 SYRINGE (ML) INTRAVENOUS SEE ADMIN INSTRUCTIONS
Status: DISCONTINUED | OUTPATIENT
Start: 2024-08-06 | End: 2024-08-06 | Stop reason: HOSPADM

## 2024-08-06 RX ORDER — DEXAMETHASONE SODIUM PHOSPHATE 4 MG/ML
4 INJECTION, SOLUTION INTRA-ARTICULAR; INTRALESIONAL; INTRAMUSCULAR; INTRAVENOUS; SOFT TISSUE
Status: DISCONTINUED | OUTPATIENT
Start: 2024-08-06 | End: 2024-08-06 | Stop reason: HOSPADM

## 2024-08-06 RX ORDER — LABETALOL HYDROCHLORIDE 5 MG/ML
10 INJECTION, SOLUTION INTRAVENOUS
Status: DISCONTINUED | OUTPATIENT
Start: 2024-08-06 | End: 2024-08-06 | Stop reason: HOSPADM

## 2024-08-06 RX ORDER — ENOXAPARIN SODIUM 100 MG/ML
40 INJECTION SUBCUTANEOUS
Status: COMPLETED | OUTPATIENT
Start: 2024-08-06 | End: 2024-08-06

## 2024-08-06 RX ORDER — EPHEDRINE SULFATE 50 MG/ML
INJECTION, SOLUTION INTRAMUSCULAR; INTRAVENOUS; SUBCUTANEOUS PRN
Status: DISCONTINUED | OUTPATIENT
Start: 2024-08-06 | End: 2024-08-06

## 2024-08-06 RX ORDER — ONDANSETRON 4 MG/1
4 TABLET, ORALLY DISINTEGRATING ORAL EVERY 30 MIN PRN
Status: DISCONTINUED | OUTPATIENT
Start: 2024-08-06 | End: 2024-08-06 | Stop reason: HOSPADM

## 2024-08-06 RX ORDER — DEXAMETHASONE SODIUM PHOSPHATE 4 MG/ML
INJECTION, SOLUTION INTRA-ARTICULAR; INTRALESIONAL; INTRAMUSCULAR; INTRAVENOUS; SOFT TISSUE PRN
Status: DISCONTINUED | OUTPATIENT
Start: 2024-08-06 | End: 2024-08-06

## 2024-08-06 RX ORDER — HYDROMORPHONE HCL IN WATER/PF 6 MG/30 ML
0.2 PATIENT CONTROLLED ANALGESIA SYRINGE INTRAVENOUS EVERY 5 MIN PRN
Status: DISCONTINUED | OUTPATIENT
Start: 2024-08-06 | End: 2024-08-06 | Stop reason: HOSPADM

## 2024-08-06 RX ORDER — OXYCODONE HYDROCHLORIDE 5 MG/1
5-10 TABLET ORAL
Status: COMPLETED | OUTPATIENT
Start: 2024-08-06 | End: 2024-08-06

## 2024-08-06 RX ORDER — FENTANYL CITRATE 50 UG/ML
INJECTION, SOLUTION INTRAMUSCULAR; INTRAVENOUS PRN
Status: DISCONTINUED | OUTPATIENT
Start: 2024-08-06 | End: 2024-08-06

## 2024-08-06 RX ORDER — FENTANYL CITRATE 50 UG/ML
25 INJECTION, SOLUTION INTRAMUSCULAR; INTRAVENOUS
Status: DISCONTINUED | OUTPATIENT
Start: 2024-08-06 | End: 2024-08-06 | Stop reason: HOSPADM

## 2024-08-06 RX ORDER — METHOCARBAMOL 750 MG/1
750 TABLET, FILM COATED ORAL
Status: COMPLETED | OUTPATIENT
Start: 2024-08-06 | End: 2024-08-06

## 2024-08-06 RX ORDER — FENTANYL CITRATE 50 UG/ML
25 INJECTION, SOLUTION INTRAMUSCULAR; INTRAVENOUS EVERY 5 MIN PRN
Status: DISCONTINUED | OUTPATIENT
Start: 2024-08-06 | End: 2024-08-06 | Stop reason: HOSPADM

## 2024-08-06 RX ORDER — ONDANSETRON 2 MG/ML
4 INJECTION INTRAMUSCULAR; INTRAVENOUS EVERY 30 MIN PRN
Status: DISCONTINUED | OUTPATIENT
Start: 2024-08-06 | End: 2024-08-06 | Stop reason: HOSPADM

## 2024-08-06 RX ORDER — HYDROMORPHONE HCL IN WATER/PF 6 MG/30 ML
0.4 PATIENT CONTROLLED ANALGESIA SYRINGE INTRAVENOUS EVERY 5 MIN PRN
Status: DISCONTINUED | OUTPATIENT
Start: 2024-08-06 | End: 2024-08-06 | Stop reason: HOSPADM

## 2024-08-06 RX ORDER — ACETAMINOPHEN 325 MG/1
975 TABLET ORAL ONCE
Status: COMPLETED | OUTPATIENT
Start: 2024-08-06 | End: 2024-08-06

## 2024-08-06 RX ORDER — KETOROLAC TROMETHAMINE 30 MG/ML
INJECTION, SOLUTION INTRAMUSCULAR; INTRAVENOUS PRN
Status: DISCONTINUED | OUTPATIENT
Start: 2024-08-06 | End: 2024-08-06

## 2024-08-06 RX ORDER — OXYCODONE HYDROCHLORIDE 10 MG/1
10 TABLET ORAL
Status: DISCONTINUED | OUTPATIENT
Start: 2024-08-06 | End: 2024-08-06 | Stop reason: HOSPADM

## 2024-08-06 RX ORDER — SODIUM CHLORIDE, SODIUM LACTATE, POTASSIUM CHLORIDE, CALCIUM CHLORIDE 600; 310; 30; 20 MG/100ML; MG/100ML; MG/100ML; MG/100ML
INJECTION, SOLUTION INTRAVENOUS CONTINUOUS
Status: DISCONTINUED | OUTPATIENT
Start: 2024-08-06 | End: 2024-08-06 | Stop reason: HOSPADM

## 2024-08-06 RX ORDER — MEPERIDINE HYDROCHLORIDE 25 MG/ML
12.5 INJECTION INTRAMUSCULAR; INTRAVENOUS; SUBCUTANEOUS EVERY 5 MIN PRN
Status: DISCONTINUED | OUTPATIENT
Start: 2024-08-06 | End: 2024-08-06 | Stop reason: HOSPADM

## 2024-08-06 RX ORDER — OXYCODONE HYDROCHLORIDE 5 MG/1
5 TABLET ORAL
Status: DISCONTINUED | OUTPATIENT
Start: 2024-08-06 | End: 2024-08-06 | Stop reason: HOSPADM

## 2024-08-06 RX ORDER — OXYCODONE HYDROCHLORIDE 5 MG/1
5-10 TABLET ORAL EVERY 4 HOURS PRN
Qty: 10 TABLET | Refills: 0 | Status: SHIPPED | OUTPATIENT
Start: 2024-08-06 | End: 2024-08-27

## 2024-08-06 RX ORDER — SCOLOPAMINE TRANSDERMAL SYSTEM 1 MG/1
1 PATCH, EXTENDED RELEASE TRANSDERMAL ONCE
Status: DISCONTINUED | OUTPATIENT
Start: 2024-08-06 | End: 2024-08-06 | Stop reason: HOSPADM

## 2024-08-06 RX ORDER — LIDOCAINE HYDROCHLORIDE 20 MG/ML
INJECTION, SOLUTION INFILTRATION; PERINEURAL PRN
Status: DISCONTINUED | OUTPATIENT
Start: 2024-08-06 | End: 2024-08-06

## 2024-08-06 RX ADMIN — ONDANSETRON 4 MG: 2 INJECTION INTRAMUSCULAR; INTRAVENOUS at 09:56

## 2024-08-06 RX ADMIN — SODIUM CHLORIDE, POTASSIUM CHLORIDE, SODIUM LACTATE AND CALCIUM CHLORIDE: 600; 310; 30; 20 INJECTION, SOLUTION INTRAVENOUS at 08:36

## 2024-08-06 RX ADMIN — SUGAMMADEX 50 MG: 100 INJECTION, SOLUTION INTRAVENOUS at 10:20

## 2024-08-06 RX ADMIN — KETOROLAC TROMETHAMINE 30 MG: 30 INJECTION, SOLUTION INTRAMUSCULAR at 09:56

## 2024-08-06 RX ADMIN — Medication 2 G: at 08:36

## 2024-08-06 RX ADMIN — ACETAMINOPHEN 650 MG: 325 TABLET, FILM COATED ORAL at 11:58

## 2024-08-06 RX ADMIN — ACETAMINOPHEN 975 MG: 325 TABLET, FILM COATED ORAL at 07:04

## 2024-08-06 RX ADMIN — SCOPALAMINE 1 PATCH: 1 PATCH, EXTENDED RELEASE TRANSDERMAL at 07:05

## 2024-08-06 RX ADMIN — HYDROMORPHONE HYDROCHLORIDE 0.5 MG: 1 INJECTION, SOLUTION INTRAMUSCULAR; INTRAVENOUS; SUBCUTANEOUS at 09:56

## 2024-08-06 RX ADMIN — PROPOFOL 200 MG: 10 INJECTION, EMULSION INTRAVENOUS at 08:36

## 2024-08-06 RX ADMIN — FENTANYL CITRATE 100 MCG: 50 INJECTION INTRAMUSCULAR; INTRAVENOUS at 09:03

## 2024-08-06 RX ADMIN — DEXAMETHASONE SODIUM PHOSPHATE 8 MG: 4 INJECTION, SOLUTION INTRA-ARTICULAR; INTRALESIONAL; INTRAMUSCULAR; INTRAVENOUS; SOFT TISSUE at 08:36

## 2024-08-06 RX ADMIN — Medication 10 MG: at 09:43

## 2024-08-06 RX ADMIN — MIDAZOLAM 2 MG: 1 INJECTION INTRAMUSCULAR; INTRAVENOUS at 08:24

## 2024-08-06 RX ADMIN — LIDOCAINE HYDROCHLORIDE 100 MG: 20 INJECTION, SOLUTION INFILTRATION; PERINEURAL at 08:36

## 2024-08-06 RX ADMIN — Medication 50 MG: at 08:36

## 2024-08-06 RX ADMIN — METHOCARBAMOL TABLETS 750 MG: 750 TABLET, COATED ORAL at 11:55

## 2024-08-06 RX ADMIN — EPHEDRINE SULFATE 10 MG: 5 INJECTION INTRAVENOUS at 09:13

## 2024-08-06 RX ADMIN — ENOXAPARIN SODIUM 40 MG: 40 INJECTION SUBCUTANEOUS at 08:03

## 2024-08-06 RX ADMIN — Medication 20 MG: at 09:05

## 2024-08-06 RX ADMIN — OXYCODONE HYDROCHLORIDE 5 MG: 5 TABLET ORAL at 11:10

## 2024-08-06 RX ADMIN — SUGAMMADEX 100 MG: 100 INJECTION, SOLUTION INTRAVENOUS at 10:09

## 2024-08-06 ASSESSMENT — ACTIVITIES OF DAILY LIVING (ADL)
ADLS_ACUITY_SCORE: 18

## 2024-08-06 NOTE — ANESTHESIA POSTPROCEDURE EVALUATION
Patient: Pedrito Jenkins    Procedure: Procedure(s):  LAPAROSCOPIC,  ABDOMINAL BIOPSY, ASPIRATION OF PERITONEAL FLUID, UMBILICAL HERNIA REPAIR       Anesthesia Type:  General    Note:  Disposition: Outpatient   Postop Pain Control: Uneventful            Sign Out: Well controlled pain   PONV: No   Neuro/Psych: Uneventful            Sign Out: Acceptable/Baseline neuro status   Airway/Respiratory: Uneventful            Sign Out: Acceptable/Baseline resp. status   CV/Hemodynamics: Uneventful            Sign Out: Acceptable CV status; No obvious hypovolemia; No obvious fluid overload   Other NRE: NONE   DID A NON-ROUTINE EVENT OCCUR? No       Last vitals:  Vitals Value Taken Time   /74 08/06/24 1100   Temp     Pulse 60 08/06/24 1106   Resp 16 08/06/24 1106   SpO2 94 % 08/06/24 1106   Vitals shown include unfiled device data.    Electronically Signed By: Stephanie Nolan MD  August 6, 2024  11:06 AM

## 2024-08-06 NOTE — BRIEF OP NOTE
[facility]    Brief Operative Note    Pre-operative diagnosis: Mesenteric mass [K63.89]  Post-operative diagnosis Same as pre-operative diagnosis    Procedure: LAPAROSCOPIC,  ABDOMINAL BIOPSY, ASPIRATION OF PERITONEAL FLUID, UMBILICAL HERNIA REPAIR, N/A - Abdomen    Surgeon: Surgeons and Role:     * Charly Werner MD - Primary     * Bk Ocasio MD - Resident - Observing  Anesthesia: General   Estimated Blood Loss: 5 cc    Drains: None  Specimens:   ID Type Source Tests Collected by Time Destination   1 :  Peritoneal Fluid Peritoneum NON-GYNECOLOGIC CYTOLOGY Charly Werner MD 8/6/2024  9:24 AM    2 : Abdominal Mass Tissue Abdomen SURGICAL PATHOLOGY EXAM Charly Werner MD 8/6/2024  9:55 AM      Findings:   Mesenteric mass biopsied .  Complications: None.  Implants: * No implants in log *

## 2024-08-06 NOTE — ANESTHESIA PROCEDURE NOTES
Airway       Patient location during procedure: OR       Procedure Start/Stop Times: 8/6/2024 8:40 AM  Staff -        CRNA: Jayla Duenas APRN CRNA       Performed By: CRNAIndications and Patient Condition       Indications for airway management: artemio-procedural       Induction type:intravenous       Mask difficulty assessment: 1 - vent by mask    Final Airway Details       Final airway type: endotracheal airway       Successful airway: ETT - single  Endotracheal Airway Details        ETT size (mm): 7.5       Cuffed: yes       Successful intubation technique: direct laryngoscopy       DL Blade Type: Collazo 2       Grade View of Cords: 1       Adjucts: stylet       Position: Right       Measured from: lips       Secured at (cm): 23       Bite block used: None    Post intubation assessment        Placement verified by: capnometry, equal breath sounds and chest rise        Number of attempts at approach: 1       Number of other approaches attempted: 0       Secured with: tape       Ease of procedure: easy       Dentition: Intact and Unchanged    Medication(s) Administered   Medication Administration Time: 8/6/2024 8:40 AM

## 2024-08-06 NOTE — ANESTHESIA CARE TRANSFER NOTE
Patient: Pedrito Jenkins    Procedure: Procedure(s):  LAPAROSCOPIC,  ABDOMINAL BIOPSY, ASPIRATION OF PERITONEAL FLUID, UMBILICAL HERNIA REPAIR       Diagnosis: Mesenteric mass [K63.89]  Diagnosis Additional Information: No value filed.    Anesthesia Type:   General     Note:    Oropharynx: oropharynx clear of all foreign objects and spontaneously breathing  Level of Consciousness: drowsy  Oxygen Supplementation: nasal cannula  Level of Supplemental Oxygen (L/min / FiO2): 4  Independent Airway: airway patency satisfactory and stable  Dentition: dentition unchanged  Vital Signs Stable: post-procedure vital signs reviewed and stable  Report to RN Given: handoff report given  Patient transferred to: PACU    Handoff Report: Identifed the Patient, Identified the Reponsible Provider, Reviewed the pertinent medical history, Discussed the surgical course, Reviewed Intra-OP anesthesia mangement and issues during anesthesia, Set expectations for post-procedure period and Allowed opportunity for questions and acknowledgement of understanding      Vitals:  Vitals Value Taken Time   /81    Temp     Pulse 60    Resp 14    SpO2 99        Electronically Signed By: PARAS Kaufman CRNA  August 6, 2024  10:36 AM

## 2024-08-06 NOTE — DISCHARGE INSTRUCTIONS
Contacting your Doctor -   To contact a doctor, call Dr. Werner's office at [phone] or:  [phone] and ask for the resident on call for Surgical Oncology (answered 24 hours a day)   Emergency Department:  [facility]: [phone] if you are in need of immediate or emergent help

## 2024-08-06 NOTE — OP NOTE
Preoperative Diagnosis: Intra-abdominal mass    Postoperative Diagnosis: Same    Procedure: Laparoscopy, aspiration laparoscopic fluid, laparoscopic intra-abdominal mass biopsy, and umbilical hernia repair    Surgeons: Dr. Charly Werner and ZUNILDA Tam.  Joceline Beckwith assisted with the operation because there is no surgical resident available    Anesthesia: General    Indications for Surgery: The patient is a 53-year-old man who was found to have an intra-abdominal mass.  The mass appeared to be arising from the small bowel mesentery.    Procedure in Detail: After informed consent the patient was brought the operative room given a general anesthetic.  He was prepped and draped in usual fashion.  We noticed he had a small umbilical hernia.  So I made a small incision around the umbilicus with a scalpel.  The Bovie cautery was used to incise subcutaneous tissues.  The fascia was incised and the peritoneal cavity was entered.  Under direct vision I placed a 12 mm port in the peritoneal cavity.  A pneumoperitoneum was established.  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  We tried to aspirate some of it but it was fairly small.  So I instilled some saline into the peritoneal cavity and aspirated the chylous ascites and sent this for cytology.  We evaluated the liver and the peritoneal cavity and found no suspicious findings.  We did identify the large mass in the mesentery of the small bowel at the location noted on CT scan.  I incised the peritoneum with the Metzenbaum scissors.  I then dissected into the mesenteric mass with the harmonic scalpel.  Removed several samples of the mesenteric mass.  The appearance did look like fleshy tumor.  Hemostasis was obtained with the Bovie cautery and with surgical snow.  The ports were then removed.  We then repaired the umbilical hernia by excising the umbilical hernia sac.  The cut back to fresh fascia on the patient's right side.  We then performed a primary repair of  the fascial defect with a running 2-0 PDS suture.  The skin was closed with 4-0 PDS subcuticular stitch.  Dermabond was placed.          Charly Werner MD, MS  Surgical Oncology

## 2024-08-09 LAB
PATH REPORT.COMMENTS IMP SPEC: ABNORMAL
PATH REPORT.COMMENTS IMP SPEC: YES
PATH REPORT.FINAL DX SPEC: ABNORMAL
PATH REPORT.GROSS SPEC: ABNORMAL
PATH REPORT.MICROSCOPIC SPEC OTHER STN: ABNORMAL
PATH REPORT.RELEVANT HX SPEC: ABNORMAL
PHOTO IMAGE: ABNORMAL

## 2024-08-10 LAB
PATH REPORT.COMMENTS IMP SPEC: NORMAL
PATH REPORT.FINAL DX SPEC: NORMAL
PATH REPORT.MICROSCOPIC SPEC OTHER STN: NORMAL
PATH REPORT.RELEVANT HX SPEC: NORMAL

## 2024-08-12 ENCOUNTER — MYC MEDICAL ADVICE (OUTPATIENT)
Dept: FAMILY MEDICINE | Facility: CLINIC | Age: 54
End: 2024-08-12
Payer: COMMERCIAL

## 2024-08-12 ENCOUNTER — PATIENT OUTREACH (OUTPATIENT)
Dept: ONCOLOGY | Facility: CLINIC | Age: 54
End: 2024-08-12
Payer: COMMERCIAL

## 2024-08-12 DIAGNOSIS — C81.90 HODGKIN LYMPHOMA (H): Primary | ICD-10-CM

## 2024-08-12 NOTE — PROGRESS NOTES
[facility]: Surgical Oncology Post-op Call Note                                     Discussion with Patient                                                           Surgery:  Laparoscopy, aspiration laparoscopic fluid, laparoscopic intra-abdominal mass biopsy, and umbilical hernia repair      Surgery date: 8/6/2024     Discharge Date:  8/6/2024    Date of Post-op Call:  8/12/2024       Immediate Concerns:     None. Kamran has seen his pathology  results and is asking about next steps. A Medical Oncology referral has been placed and he is aware a Nurse Navigator will be calling him to schedule him for a consult. Plan to cancel his phone visit with Dr. Werner on 8/22/2024 since this is no longer needed.     Fever:   Denies fever/chills.      Pain:  No concerns with pain management. Taking scheduled Tylenol and Advil.  Using pain medications as recommended with appropriate relief.   Discussed using medication only as needed. Not scheduled.      Incision:   No concerns, healing well, no redness, drainage or edema reported.      Bowels:   Passing gas.   Taking stool softeners daily.   Denies feelings of constipation and cramping.      Activity:   No difficulty with ADLs reported.   Patient is up independently at home.   Encouraged patient to continue to stay active.        Post op/follow up plans:      Plan made for Medical Oncology Referral based on his pathology results. Direct contact number provided for any additional questions or concerns.         Sheila Rodriguez RNCC  [facility]  Surgical Oncology       Approximately 5 min was spent in conversation with the patient.

## 2024-08-13 NOTE — TELEPHONE ENCOUNTER
FYI.     Would you like us to continue and cancel these or would you like additional information from the patient first?

## 2024-08-14 DIAGNOSIS — C81.00 NODULAR LYMPHOCYTE PREDOMINANT HODGKIN LYMPHOMA, UNSPECIFIED BODY REGION (H): Primary | ICD-10-CM

## 2024-08-15 ENCOUNTER — LAB (OUTPATIENT)
Dept: LAB | Facility: CLINIC | Age: 54
End: 2024-08-15
Payer: COMMERCIAL

## 2024-08-15 ENCOUNTER — TELEPHONE (OUTPATIENT)
Dept: GASTROENTEROLOGY | Facility: CLINIC | Age: 54
End: 2024-08-15

## 2024-08-15 DIAGNOSIS — C81.00 NODULAR LYMPHOCYTE PREDOMINANT HODGKIN LYMPHOMA, UNSPECIFIED BODY REGION (H): ICD-10-CM

## 2024-08-15 DIAGNOSIS — R11.0 NAUSEA: ICD-10-CM

## 2024-08-15 LAB
ALBUMIN SERPL BCG-MCNC: 4.6 G/DL (ref 3.5–5.2)
ALP SERPL-CCNC: 103 U/L (ref 40–150)
ALT SERPL W P-5'-P-CCNC: 25 U/L (ref 0–70)
ANION GAP SERPL CALCULATED.3IONS-SCNC: 11 MMOL/L (ref 7–15)
AST SERPL W P-5'-P-CCNC: 28 U/L (ref 0–45)
BILIRUB SERPL-MCNC: 0.5 MG/DL
BUN SERPL-MCNC: 16.4 MG/DL (ref 6–20)
CALCIUM SERPL-MCNC: 9.6 MG/DL (ref 8.8–10.4)
CHLORIDE SERPL-SCNC: 103 MMOL/L (ref 98–107)
CREAT SERPL-MCNC: 0.99 MG/DL (ref 0.67–1.17)
EGFRCR SERPLBLD CKD-EPI 2021: >90 ML/MIN/1.73M2
GLUCOSE SERPL-MCNC: 95 MG/DL (ref 70–99)
HBV CORE AB SERPL QL IA: NONREACTIVE
HBV SURFACE AB SERPL IA-ACNC: <3.5 M[IU]/ML
HBV SURFACE AB SERPL IA-ACNC: NONREACTIVE M[IU]/ML
HBV SURFACE AG SERPL QL IA: NONREACTIVE
HCO3 SERPL-SCNC: 25 MMOL/L (ref 22–29)
HCV AB SERPL QL IA: NONREACTIVE
HIV 1+2 AB+HIV1 P24 AG SERPL QL IA: NONREACTIVE
LDH SERPL L TO P-CCNC: 198 U/L (ref 0–250)
POTASSIUM SERPL-SCNC: 4.4 MMOL/L (ref 3.4–5.3)
PROT SERPL-MCNC: 7 G/DL (ref 6.4–8.3)
SODIUM SERPL-SCNC: 139 MMOL/L (ref 135–145)
URATE SERPL-MCNC: 6 MG/DL (ref 3.4–7)

## 2024-08-15 PROCEDURE — 80053 COMPREHEN METABOLIC PANEL: CPT

## 2024-08-15 PROCEDURE — 86803 HEPATITIS C AB TEST: CPT

## 2024-08-15 PROCEDURE — 86704 HEP B CORE ANTIBODY TOTAL: CPT

## 2024-08-15 PROCEDURE — 82784 ASSAY IGA/IGD/IGG/IGM EACH: CPT

## 2024-08-15 PROCEDURE — 82232 ASSAY OF BETA-2 PROTEIN: CPT

## 2024-08-15 PROCEDURE — 83615 LACTATE (LD) (LDH) ENZYME: CPT

## 2024-08-15 PROCEDURE — 87340 HEPATITIS B SURFACE AG IA: CPT

## 2024-08-15 PROCEDURE — 36415 COLL VENOUS BLD VENIPUNCTURE: CPT

## 2024-08-15 PROCEDURE — 84550 ASSAY OF BLOOD/URIC ACID: CPT

## 2024-08-15 PROCEDURE — 87389 HIV-1 AG W/HIV-1&-2 AB AG IA: CPT

## 2024-08-15 PROCEDURE — 86706 HEP B SURFACE ANTIBODY: CPT

## 2024-08-15 NOTE — TELEPHONE ENCOUNTER
Last read by Pedrito Jenkins at  1:03 PM on 8/15/2024.     Patient viewed MD note to cancel EGD.    Alyx Park RN  [facility] - Registered Nurse  Clinic Triage [place]   August 15, 2024

## 2024-08-15 NOTE — TELEPHONE ENCOUNTER
Caller: Caio Jenkins    Reason for Reschedule/Cancellation   (please be detailed, any staff messages or encounters to note?): A mass was discovered, taking a new route of Tx      Prior to reschedule please review:  Ordering Provider: Migue Martinez MD   Sedation Determined: Moderate  Does patient have any ASC Exclusions, please identify?: No      Notes on Cancelled Procedure:  Procedure: Upper Endoscopy [EGD]   Date: 9.4.24  Location: [facility]; [address] [place] [address] [address], [place] [address]   Surgeon: Tyshawn      Rescheduled: No, mass was discovered, taking a new route of Tx       Did you cancel or rescheduled an EUS procedure? No.

## 2024-08-15 NOTE — TELEPHONE ENCOUNTER
RECORDS STATUS - ALL OTHER DIAGNOSIS      RECORDS RECEIVED FROM: [facility] - Internal records   DATE RECEIVED: 8/15

## 2024-08-16 ENCOUNTER — HOSPITAL ENCOUNTER (OUTPATIENT)
Dept: CARDIOLOGY | Facility: CLINIC | Age: 54
Discharge: HOME | End: 2024-08-16
Attending: STUDENT IN AN ORGANIZED HEALTH CARE EDUCATION/TRAINING PROGRAM | Admitting: STUDENT IN AN ORGANIZED HEALTH CARE EDUCATION/TRAINING PROGRAM
Payer: COMMERCIAL

## 2024-08-16 ENCOUNTER — HOSPITAL ENCOUNTER (OUTPATIENT)
Dept: PET IMAGING | Facility: HOSPITAL | Age: 54
Discharge: HOME | End: 2024-08-16
Attending: STUDENT IN AN ORGANIZED HEALTH CARE EDUCATION/TRAINING PROGRAM | Admitting: STUDENT IN AN ORGANIZED HEALTH CARE EDUCATION/TRAINING PROGRAM
Payer: COMMERCIAL

## 2024-08-16 DIAGNOSIS — C81.00 NODULAR LYMPHOCYTE PREDOMINANT HODGKIN LYMPHOMA, UNSPECIFIED BODY REGION (H): ICD-10-CM

## 2024-08-16 LAB
B2 MICROGLOB TUMOR MARKER SER-MCNC: 1.6 MG/L
GLUCOSE BLDC GLUCOMTR-MCNC: 107 MG/DL (ref 70–99)
IGA SERPL-MCNC: 37 MG/DL (ref 84–499)
LVEF ECHO: NORMAL

## 2024-08-16 PROCEDURE — 343N000001 HC RX 343: Performed by: STUDENT IN AN ORGANIZED HEALTH CARE EDUCATION/TRAINING PROGRAM

## 2024-08-16 PROCEDURE — 82962 GLUCOSE BLOOD TEST: CPT

## 2024-08-16 PROCEDURE — 93306 TTE W/DOPPLER COMPLETE: CPT | Mod: 26 | Performed by: INTERNAL MEDICINE

## 2024-08-16 PROCEDURE — A9552 F18 FDG: HCPCS | Performed by: STUDENT IN AN ORGANIZED HEALTH CARE EDUCATION/TRAINING PROGRAM

## 2024-08-16 PROCEDURE — 93356 MYOCRD STRAIN IMG SPCKL TRCK: CPT | Performed by: INTERNAL MEDICINE

## 2024-08-16 PROCEDURE — 93306 TTE W/DOPPLER COMPLETE: CPT

## 2024-08-16 PROCEDURE — 78816 PET IMAGE W/CT FULL BODY: CPT | Mod: PI

## 2024-08-16 RX ORDER — FLUDEOXYGLUCOSE F 18 200 MCI/ML
7-18 INJECTION, SOLUTION INTRAVENOUS ONCE
Status: COMPLETED | OUTPATIENT
Start: 2024-08-16 | End: 2024-08-16

## 2024-08-16 RX ADMIN — FLUDEOXYGLUCOSE F 18 11.59 MILLICURIE: 200 INJECTION, SOLUTION INTRAVENOUS at 07:14

## 2024-08-19 ENCOUNTER — PRE VISIT (OUTPATIENT)
Dept: ONCOLOGY | Facility: CLINIC | Age: 54
End: 2024-08-19
Payer: COMMERCIAL

## 2024-08-19 ENCOUNTER — ONCOLOGY VISIT (OUTPATIENT)
Dept: ONCOLOGY | Facility: CLINIC | Age: 54
End: 2024-08-19
Attending: SURGERY
Payer: COMMERCIAL

## 2024-08-19 ENCOUNTER — PATIENT OUTREACH (OUTPATIENT)
Dept: ONCOLOGY | Facility: CLINIC | Age: 54
End: 2024-08-19
Payer: COMMERCIAL

## 2024-08-19 VITALS
TEMPERATURE: 98.1 F | SYSTOLIC BLOOD PRESSURE: 138 MMHG | HEIGHT: 69 IN | WEIGHT: 194 LBS | BODY MASS INDEX: 28.73 KG/M2 | DIASTOLIC BLOOD PRESSURE: 85 MMHG | HEART RATE: 60 BPM | OXYGEN SATURATION: 98 % | RESPIRATION RATE: 15 BRPM

## 2024-08-19 DIAGNOSIS — C81.03 NODULAR LYMPHOCYTE PREDOMINANT HODGKIN LYMPHOMA OF INTRA-ABDOMINAL LYMPH NODES (H): Primary | ICD-10-CM

## 2024-08-19 PROCEDURE — 99205 OFFICE O/P NEW HI 60 MIN: CPT | Performed by: STUDENT IN AN ORGANIZED HEALTH CARE EDUCATION/TRAINING PROGRAM

## 2024-08-19 PROCEDURE — 99417 PROLNG OP E/M EACH 15 MIN: CPT | Performed by: STUDENT IN AN ORGANIZED HEALTH CARE EDUCATION/TRAINING PROGRAM

## 2024-08-19 PROCEDURE — 99213 OFFICE O/P EST LOW 20 MIN: CPT | Performed by: STUDENT IN AN ORGANIZED HEALTH CARE EDUCATION/TRAINING PROGRAM

## 2024-08-19 ASSESSMENT — PAIN SCALES - GENERAL: PAINLEVEL: NO PAIN (0)

## 2024-08-19 NOTE — NURSING NOTE
"Oncology Rooming Note    August 19, 2024 10:19 AM   Pedrito Jenkins is a 53 year old male who presents for:    Chief Complaint   Patient presents with    Oncology Clinic Visit     Hodgkin lymphoma      Initial Vitals: /85 (BP Location: Right arm, Patient Position: Sitting, Cuff Size: Adult Regular)   Pulse 60   Temp 98.1  F (36.7  C) (Oral)   Resp 15   Ht 1.745 m (5' 8.7\")   Wt 88 kg (194 lb)   SpO2 98%   BMI 28.90 kg/m   Estimated body mass index is 28.9 kg/m  as calculated from the following:    Height as of this encounter: 1.745 m (5' 8.7\").    Weight as of this encounter: 88 kg (194 lb). Body surface area is 2.07 meters squared.  No Pain (0) Comment: Data Unavailable   No LMP for male patient.  Allergies reviewed: Yes  Medications reviewed: Yes    Medications: Medication refills not needed today.  Pharmacy name entered into [facility]: [id number] - [place], [place] - [address] [place] [address]    Frailty Screening:   Is the patient here for a new oncology consult visit in cancer care? 1. Yes. Over the past month, have you experienced difficulty or required a caregiver to assist with:   1. Balance, walking or general mobility (including any falls)? NO  2. Completion of self-care tasks such as bathing, dressing, toileting, grooming/hygiene?  NO  3. Concentration or memory that affects your daily life?  NO       Clinical concerns: Pt reports no new concerns.       Summer Serra, EMT   "

## 2024-08-19 NOTE — PROGRESS NOTES
"[facility]: Cancer Care Initial Note                                    Discussion with Patient:                                                      Met with Kamran and Grace after clinic visit/consultation appt with Dr. Lu.   Pt  verbalizes understanding of Dr. Lu's plan of care   Household includes: Pt, wife and 2 kids (ages 15 and 18) One other child will be in [place] for school.  Barriers to care identified: None.     Introduced self and role of RN Care Coordinator at [facility].     Provided my contact information, [facility] phone number (which has options to talk with a Nurse available 24/7 - triage and RNCC via this option during business hours). Reviewed appropriate use of MyChart, not to be used for symptom reporting.   Reviewed [facility] care team members including midlevel providers in oncology dept and Dr. Lu's usual clinic hours.     Provided overview of supportive services at [facility] including SW, oncology dietitian, oncology behavioural health providers (psychology, psychiatry, counseling), as well as the availability of [facility] as needed.     Reviewed sections of [facility] Cancer Care Guidebook, including \"Good to Know Numbers\" and \"When to Contact Your Provider.\"       Auth to Discuss PHI form completed by patient and original sent to [facility] admin staff to enter into chart and send to HIM for scanning.     Learning assessment Completed    Answered questions regarding:   Infusion. Gave handout for [facility] and lymphoma.     Kamran voiced understanding and appreciation of above information and denies any further questions, and he/she understands that I will follow and provide coordination as needed.             Goals          General    communication     Notes - Note created  8/19/2024 12:29 PM by Marta Alexis, RN    Goal Statement: I will use my clinic and care team resources as directed.  Date Goal set: 8/19/2024  Barriers: disease " burden  Strengths: support, coping, motivation, health awareness, and involvement with care team  Date to Achieve By: ongoing  Patient expressed understanding of goal: Yes  Action steps to achieve this goal:  I will contact triage with new, worsening or uncontrolled symptoms.   I will contact triage with temperature over 100.4  I will call with difficulties of scheduling and/or transportation.   I will request needed refills when there are 7 days of medication remaining.   I will not send urgent or symptomatic messages through [employer].   I will contact scheduling to arrange or make changes in my appointments.                 Assessment:                                                      Initial  Current living arrangement:: I live in a private home with family  Informal Support system:: Spouse  Equipment Currently Used at Home: none  Bed or wheelchair confined:: No  Mobility Status: Independent  Transportation means:: Regular car    Plan of Care Education Review:   Assessment completed with:: Patient    Plan of Care Education   Yearly learning assessment completed?: Yes (see Education tab)  Diagnosis:: Hodgkin Lymphoma  Does patient understand diagnosis?: Yes  Tx plan/regimen:: RCHOP  Does patient understand treatment plan/regimen?: Yes  Preparing for treatment:: Reviewed treatment preparation information with patient (vascular access, day of chemo, visitor policy, what to bring, etc.)  Vascular access education provided for:: Peripheral IV  Plan of Care:: Lab appointment;[facility] follow-up appointment;Treatment schedule  When to call provider:: Bleeding;Increased shortness of breath;New/worsening pain;Shaking chills;Temperature >100.4F;Uncontrolled diarrhea/constipation;Uncontrolled nausea/vomiting    Evaluation of Learning  Patient Education Provided: Yes  Readiness:: Acceptance  Method:: Booklet/Handout;Explanation  Response:: Verbalizes understanding           Intervention/Education provided during outreach:                                                        IV chemo sheets [facility] given as well as folder, RNCC contact info and when to call clinic.      Follow up call in 1-2 weeks  Patient to follow up as scheduled at next appt  Patient to call/[employer] message with updates  Confirmed patient has clinic and triage numbers    Signature:  Marta Alexis RN

## 2024-08-19 NOTE — LETTER
8/19/2024      Pedrito Jenkins  [address]  [facility], [place] [address]      Dear Colleague,    Thank you for referring your patient, Pedrito Jenkins, to the [facility]. Please see a copy of my visit note below.      Subjective  Pedrito is a 53 year old, presenting for the following health issues:  Oncology Clinic Visit (Hodgkin lymphoma )    HPI     Oncology History Overview Note   This is a 53-year-old man coming in for initial consultation of newly diagnosed nodular lymphocyte predominant Hodgkin lymphoma.    Patient had been in his usual state of health until June 2024 when he developed gradually progressive nausea and mild abdominal discomfort.  He also had some loose stools but not watery diarrhea.  The symptoms led to medical attention.  Initially an exhaustive workup for gastrointestinal pathology was done including IgA levels in the blood, enteric bacterial panel calprotectin and Helicobacter pylori in the stool, blood lipase levels, blood tissue transglutaminase levels, TSH, CRP, ESR as well as vitamin D and vitamin B12 levels.  Vitamin B12 and vitamin D levels were within normal limits.  Erythrocyte sedimentation rate was also within normal limits CRP was also within normal limits TSH was normal as well tissue transglutaminase antibodies were negative lipase levels were within normal limits, enteric bacterial panel was positive for enterotoxigenic E. coli, calprotectin and stool was negative, H. pylori stool antigen was negative as well.    A CT scan was done as well to further investigate patient's nausea and abdominal pain.  That showed 5 cm x 4.7 cm x 5.3 cm enhancing soft tissue mass in the mesentery and there are scattered mesenteric lymphadenopathy surrounding this lesion and mesenteric stranding and haziness.  He subsequently saw Dr. Werner from general surgery and underwent laparoscopic biopsy of the mass on 6 August 2024.  Patient also had umbilical hernia that was repaired.   "Patient recovered from the surgery without any major complications.  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  Subsequent PET scan done on 16th of August showed hypermetabolic mesenteric mass correlating with the prior CT scan with SUV max of 11.  There are scattered hypermetabolic lymphadenopathy in the mesentery surrounding the mass as well as mesenteric stranding. Patient does not have any major cytopenias on peripheral blood work as well as no renal or kidney abnormalities.    Nausea has resolved after surgery but patient still have very mild abdominal discomfort.  No high-grade fevers drenching night sweats.  Patient does feel fatigued.  Appetite is good.  ECOG performance status is 0-1.       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 No fevers, chills, night sweats or weight loss, no lymphadenopathy. Mild abdominal discomfort.          Objective   /85 (BP Location: Right arm, Patient Position: Sitting, Cuff Size: Adult Regular)   Pulse 60   Temp 98.1  F (36.7  C) (Oral)   Resp 15   Ht 1.745 m (5' 8.7\")   Wt 88 kg (194 lb)   SpO2 98%   BMI 28.90 kg/m    Body mass index is 28.9 kg/m .  Physical Exam   GENERAL:  Alert oriented well nourished  HEAD: normocephalic atraumatic  SKIN:  no rash, hives, other lesions.  LYMPHATIC: no abnormal lymph nodes palable in cervical, axillary, supraclavicular or inguinal area.  RESP:  No rales or rhonchi, breath sounds bilaterally equal and vesicular  CV:  No tachycardia, S1 S2 normal No murmur.  GI:  Abdomen soft nontender no hepato or splenomegaly  MUSCULOSKELETAL:  No visible joint " redness or swelling.  NEURO:  No gross weakness gait normal  PSYCH: pleasant affect    Labs: I personally reviewed complete blood count, differential, renal function test, liver function tests LDH.  No cytopenias, LFTs within normal limits, renal function test within normal limits and LDH is normal as well.    Imaging: I personally reviewed PETCT neck/chest/abdomen/pelvis and discussed with the patient.  Hypermetabolic mesenteric lymphadenopathy with SUVmax of 11.    Assessment and Plan:    1) Nodular lymphocyte predominant Hodgkin lymphoma:  - I discussed in detail natural history, prognosis, management of nodular lymphocyte predominant Hodgkin lymphoma. I discussed that NLPHL can have indolent or aggressive presentation and relapses have been reported after long term follow up. Subdiaphragmatic presentations are commonly associated with transformation to diffuse large B cell lymphoma. Rituximab monotherapy has been associated with more than 50% likelihood of relapse over short follow up of 3-5 years. Considering that patient is symptomatic from his disease, at high risk of transformation, I will elect to treat with RCHOP. RCHOP,  in a phase 2 trial of NLPHL, has shown high CR of 89% rates, 88.5% 5-year PFS and  reduced risk of transformation.  He has 5 cm mesenteric mass with scattered other mesenteric lymph nodes, overall radiation leads to high toxicities in this area.  I will explore the role of radiation after he finishes chemotherapy based on how he responds.  Regimen dosings, schedule, common toxicities were discussed with the patient.  Common toxicities including but not limited to nausea vomiting diarrhea rashes neuropathy was discussed.  Patient verbalized understanding of the information and all his questions were answered.  Patient consented for chemotherapy.      -Plan for 4-6 cycles of R-CHOP.  PET scan after 2 cycles.  -Acyclovir and allopurinol sent to Pharmacy to be started with  chemotherapy.  -Reached out to the surgeon Dr. Werner, okay to begin chemotherapy as soon as we want to.  -Antiemetics and prednisone will be prescribed as well and Patient to start those after he starts chemotherapy.  -Hepatitis HIV within normal limits.    Total time spent on date of service in review of medical records, review of labs, history taking, physical exam, discussion of assessment and plan, counseling and patient education is 90 minutes.    Lalito Lu MD  Attending Physician  Pager [phone]            Signed Electronically by: Lalito Lu MD      Again, thank you for allowing me to participate in the care of your patient.        Sincerely,        Lalito Lu MD

## 2024-08-20 ENCOUNTER — TELEPHONE (OUTPATIENT)
Dept: ONCOLOGY | Facility: CLINIC | Age: 54
End: 2024-08-20
Payer: COMMERCIAL

## 2024-08-20 NOTE — TELEPHONE ENCOUNTER
FMLA forms received via Right[employer]ax from [facility] [employer].      Forms to be completed and put in folder for provider to approve.    Fax #:  [phone]  Reference: [id number]    Allyn Ayala

## 2024-08-20 NOTE — PROGRESS NOTES
Winifred Major is a 53 year old, presenting for the following health issues:  Oncology Clinic Visit (Hodgkin lymphoma )    HPI     Oncology History Overview Note   This is a 53-year-old man coming in for initial consultation of newly diagnosed nodular lymphocyte predominant Hodgkin lymphoma.    Patient had been in his usual state of health until June 2024 when he developed gradually progressive nausea and mild abdominal discomfort.  He also had some loose stools but not watery diarrhea.  The symptoms led to medical attention.  Initially an exhaustive workup for gastrointestinal pathology was done including IgA levels in the blood, enteric bacterial panel calprotectin and Helicobacter pylori in the stool, blood lipase levels, blood tissue transglutaminase levels, TSH, CRP, ESR as well as vitamin D and vitamin B12 levels.  Vitamin B12 and vitamin D levels were within normal limits.  Erythrocyte sedimentation rate was also within normal limits CRP was also within normal limits TSH was normal as well tissue transglutaminase antibodies were negative lipase levels were within normal limits, enteric bacterial panel was positive for enterotoxigenic E. coli, calprotectin and stool was negative, H. pylori stool antigen was negative as well.    A CT scan was done as well to further investigate patient's nausea and abdominal pain.  That showed 5 cm x 4.7 cm x 5.3 cm enhancing soft tissue mass in the mesentery and there are scattered mesenteric lymphadenopathy surrounding this lesion and mesenteric stranding and haziness.  He subsequently saw Dr. Werner from general surgery and underwent laparoscopic biopsy of the mass on 6 August 2024.  Patient also had umbilical hernia that was repaired.  Patient recovered from the surgery without any major complications.  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  Subsequent PET scan done on 16th of August showed hypermetabolic mesenteric mass correlating with  "the prior CT scan with SUV max of 11.  There are scattered hypermetabolic lymphadenopathy in the mesentery surrounding the mass as well as mesenteric stranding. Patient does not have any major cytopenias on peripheral blood work as well as no renal or kidney abnormalities.    Nausea has resolved after surgery but patient still have very mild abdominal discomfort.  No high-grade fevers drenching night sweats.  Patient does feel fatigued.  Appetite is good.  ECOG performance status is 0-1.       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 No fevers, chills, night sweats or weight loss, no lymphadenopathy. Mild abdominal discomfort.          Objective    /85 (BP Location: Right arm, Patient Position: Sitting, Cuff Size: Adult Regular)   Pulse 60   Temp 98.1  F (36.7  C) (Oral)   Resp 15   Ht 1.745 m (5' 8.7\")   Wt 88 kg (194 lb)   SpO2 98%   BMI 28.90 kg/m    Body mass index is 28.9 kg/m .  Physical Exam   GENERAL:  Alert oriented well nourished  HEAD: normocephalic atraumatic  SKIN:  no rash, hives, other lesions.  LYMPHATIC: no abnormal lymph nodes palable in cervical, axillary, supraclavicular or inguinal area.  RESP:  No rales or rhonchi, breath sounds bilaterally equal and vesicular  CV:  No tachycardia, S1 S2 normal No murmur.  GI:  Abdomen soft nontender no hepato or splenomegaly  MUSCULOSKELETAL:  No visible joint redness or swelling.  NEURO:  No gross weakness gait normal  PSYCH: pleasant affect    Labs: I personally reviewed complete blood count, differential, renal function test, liver function tests LDH.  No cytopenias, LFTs within normal " limits, renal function test within normal limits and LDH is normal as well.    Imaging: I personally reviewed PETCT neck/chest/abdomen/pelvis and discussed with the patient.  Hypermetabolic mesenteric lymphadenopathy with SUVmax of 11.    Assessment and Plan:    1) Nodular lymphocyte predominant Hodgkin lymphoma:  - I discussed in detail natural history, prognosis, management of nodular lymphocyte predominant Hodgkin lymphoma. I discussed that NLPHL can have indolent or aggressive presentation and relapses have been reported after long term follow up. Subdiaphragmatic presentations are commonly associated with transformation to diffuse large B cell lymphoma. Rituximab monotherapy has been associated with more than 50% likelihood of relapse over short follow up of 3-5 years. Considering that patient is symptomatic from his disease, at high risk of transformation, I will elect to treat with RCHOP. RCHOP,  in a phase 2 trial of NLPHL, has shown high CR of 89% rates, 88.5% 5-year PFS and  reduced risk of transformation.  He has 5 cm mesenteric mass with scattered other mesenteric lymph nodes, overall radiation leads to high toxicities in this area.  I will explore the role of radiation after he finishes chemotherapy based on how he responds.  Regimen dosings, schedule, common toxicities were discussed with the patient.  Common toxicities including but not limited to nausea vomiting diarrhea rashes neuropathy was discussed.  Patient verbalized understanding of the information and all his questions were answered.  Patient consented for chemotherapy.      -Plan for 4-6 cycles of R-CHOP.  PET scan after 2 cycles.  -Acyclovir and allopurinol sent to Pharmacy to be started with chemotherapy.  -Reached out to the surgeon Dr. Werner, kindra to begin chemotherapy as soon as we want to.  -Antiemetics and prednisone will be prescribed as well and Patient to start those after he starts chemotherapy.  -Hepatitis HIV within normal  limits.    Total time spent on date of service in review of medical records, review of labs, history taking, physical exam, discussion of assessment and plan, counseling and patient education is 90 minutes.    Lalito Lu MD  Attending Physician  Pager [phone]            Signed Electronically by: Lalito Lu MD

## 2024-08-20 NOTE — TELEPHONE ENCOUNTER
Patient is wondering if lab on 9/24 is still needed and if so ok for the week after chemo?   Looks like this is a recheck of antibodies from June 2024 when they were low but also looks like patient did this on 8/15/24 too. Does he still need to do?    Marta Orellana CMA (AAMA)

## 2024-08-21 ENCOUNTER — NURSE TRIAGE (OUTPATIENT)
Dept: ONCOLOGY | Facility: CLINIC | Age: 54
End: 2024-08-21
Payer: COMMERCIAL

## 2024-08-21 RX ORDER — ACYCLOVIR 400 MG/1
400 TABLET ORAL EVERY 12 HOURS
Qty: 60 TABLET | Refills: 11 | Status: SHIPPED | OUTPATIENT
Start: 2024-08-21 | End: 2024-09-20

## 2024-08-21 RX ORDER — PROCHLORPERAZINE MALEATE 10 MG
10 TABLET ORAL EVERY 6 HOURS PRN
Qty: 30 TABLET | Refills: 2 | Status: SHIPPED | OUTPATIENT
Start: 2024-08-25

## 2024-08-21 RX ORDER — ALLOPURINOL 300 MG/1
300 TABLET ORAL DAILY
Qty: 30 TABLET | Refills: 3 | Status: SHIPPED | OUTPATIENT
Start: 2024-08-21 | End: 2024-09-20

## 2024-08-21 RX ORDER — PREDNISONE 50 MG/1
50 TABLET ORAL 2 TIMES DAILY
Qty: 10 TABLET | Refills: 7 | Status: SHIPPED | OUTPATIENT
Start: 2024-08-25 | End: 2024-08-30

## 2024-08-21 RX ORDER — ONDANSETRON 8 MG/1
8 TABLET, FILM COATED ORAL EVERY 8 HOURS PRN
Qty: 30 TABLET | Refills: 2 | Status: SHIPPED | OUTPATIENT
Start: 2024-08-25

## 2024-08-21 NOTE — TELEPHONE ENCOUNTER
[facility] RODRIGUEZ pharmacist called asking for substitution request: They do not have the Prednisone 50mg tablets in stock, it is a rare dose so the will not order Prednisone 50mg tablets.     Can pharmacy substitute with Prednisone 10mg tablets and pt to take 5 tablets to equal 50mg with same directions of take Days 1 through 5, Take first dose in AM prior to chemotherapy?     This writer asked if pharmacy could call pt to see of pt willing to take 5 tablets vs 1 tablet.     1120 Secure chat sent to Dr. Lu/care team with inquiry. Per Dr. Lu, okay to substitute Prednisone 10mg, take 5 tablets (50mg) etc.     1127 This writer spoke to pharmacist Sadaf who did verify with pt, okay with swallowing 5 tablets and will  later today.

## 2024-08-26 ENCOUNTER — MYC MEDICAL ADVICE (OUTPATIENT)
Dept: ONCOLOGY | Facility: CLINIC | Age: 54
End: 2024-08-26
Payer: COMMERCIAL

## 2024-08-26 ENCOUNTER — PATIENT OUTREACH (OUTPATIENT)
Dept: ONCOLOGY | Facility: CLINIC | Age: 54
End: 2024-08-26
Payer: COMMERCIAL

## 2024-08-26 RX ORDER — EPINEPHRINE 1 MG/ML
0.3 INJECTION, SOLUTION INTRAMUSCULAR; SUBCUTANEOUS EVERY 5 MIN PRN
Status: CANCELLED | OUTPATIENT
Start: 2024-08-26

## 2024-08-26 RX ORDER — ACETAMINOPHEN 325 MG/1
650 TABLET ORAL ONCE
Status: CANCELLED
Start: 2024-08-26

## 2024-08-26 RX ORDER — METHYLPREDNISOLONE SODIUM SUCCINATE 125 MG/2ML
125 INJECTION, POWDER, LYOPHILIZED, FOR SOLUTION INTRAMUSCULAR; INTRAVENOUS
Status: CANCELLED
Start: 2024-08-26

## 2024-08-26 RX ORDER — LORAZEPAM 2 MG/ML
0.5 INJECTION INTRAMUSCULAR EVERY 4 HOURS PRN
Status: CANCELLED | OUTPATIENT
Start: 2024-08-26

## 2024-08-26 RX ORDER — MEPERIDINE HYDROCHLORIDE 25 MG/ML
25 INJECTION INTRAMUSCULAR; INTRAVENOUS; SUBCUTANEOUS EVERY 30 MIN PRN
Status: CANCELLED | OUTPATIENT
Start: 2024-08-26

## 2024-08-26 RX ORDER — DIPHENHYDRAMINE HYDROCHLORIDE 50 MG/ML
50 INJECTION INTRAMUSCULAR; INTRAVENOUS
Status: CANCELLED
Start: 2024-08-26

## 2024-08-26 RX ORDER — DIPHENHYDRAMINE HCL 25 MG
50 CAPSULE ORAL ONCE
Status: CANCELLED
Start: 2024-08-26

## 2024-08-26 RX ORDER — ALBUTEROL SULFATE 0.83 MG/ML
2.5 SOLUTION RESPIRATORY (INHALATION)
Status: CANCELLED | OUTPATIENT
Start: 2024-08-26

## 2024-08-26 RX ORDER — ALBUTEROL SULFATE 90 UG/1
1-2 AEROSOL, METERED RESPIRATORY (INHALATION)
Status: CANCELLED
Start: 2024-08-26

## 2024-08-26 RX ORDER — HEPARIN SODIUM,PORCINE 10 UNIT/ML
5-20 VIAL (ML) INTRAVENOUS DAILY PRN
Status: CANCELLED | OUTPATIENT
Start: 2024-08-26

## 2024-08-26 RX ORDER — MEPERIDINE HYDROCHLORIDE 25 MG/ML
25 INJECTION INTRAMUSCULAR; INTRAVENOUS; SUBCUTANEOUS
Status: CANCELLED
Start: 2024-08-26

## 2024-08-26 RX ORDER — DOXORUBICIN HYDROCHLORIDE 2 MG/ML
50 INJECTION, SOLUTION INTRAVENOUS ONCE
Status: CANCELLED | OUTPATIENT
Start: 2024-08-26

## 2024-08-26 RX ORDER — HEPARIN SODIUM (PORCINE) LOCK FLUSH IV SOLN 100 UNIT/ML 100 UNIT/ML
5 SOLUTION INTRAVENOUS
Status: CANCELLED | OUTPATIENT
Start: 2024-08-26

## 2024-08-26 RX ORDER — PALONOSETRON 0.05 MG/ML
0.25 INJECTION, SOLUTION INTRAVENOUS ONCE
Status: CANCELLED
Start: 2024-08-26

## 2024-08-26 NOTE — PROGRESS NOTES
[facility]: Cancer Care Plan of Care Education Note                                    Discussion with Patient:                                                      Contacted patient via Walk-in Appointment Scheduler message. Discussed chemo education and what to expect at infusion appointments. Provided printed literature on R-CHOP. Reviewed possible side effects, when to contact your doctor or health care provider, and self care tips sections. Reviewed treatment schedule including cycle length, lab monitoring, and prn medications.    Provided RNCC contact information, [facility] phone number (which has options to talk with a Nurse available 24/7 - triage and RNCC via this option during business hours). Reviewed appropriate use of TimePadhart, not to be used for symptom reporting.         Goals          General    communication     Notes - Note created  8/19/2024 12:29 PM by Marta Alexis RN    Goal Statement: I will use my clinic and care team resources as directed.  Date Goal set: 8/19/2024  Barriers: disease burden  Strengths: support, coping, motivation, health awareness, and involvement with care team  Date to Achieve By: ongoing  Patient expressed understanding of goal: Yes  Action steps to achieve this goal:  I will contact triage with new, worsening or uncontrolled symptoms.   I will contact triage with temperature over 100.4  I will call with difficulties of scheduling and/or transportation.   I will request needed refills when there are 7 days of medication remaining.   I will not send urgent or symptomatic messages through FromUs.   I will contact scheduling to arrange or make changes in my appointments.                 Assessment:                                                      Assessment completed with:: Patient    Plan of Care Education   Yearly learning assessment completed?: Yes (see Education tab)  Diagnosis:: Hodgkin Lymphoma  Does patient understand diagnosis?: Yes  Tx plan/regimen:: HOP  Does  patient understand treatment plan/regimen?: Yes  Preparing for treatment:: Reviewed treatment preparation information with patient (vascular access, day of chemo, visitor policy, what to bring, etc.)  Vascular access education provided for:: Peripheral IV  Side effect education:: Diarrhea/Constipation;Fatigue;Hair loss;Immune-mediated effects;Infection;Lab value monitoring (anemia, neutropenia, thrombocytopenia);Mouth sores;Mylosuppression;Nausea/Vomiting;Neuropathy  Plan of Care:: Treatment schedule;MYNOR follow-up appointment  When to call provider:: Bleeding;Increased shortness of breath;New/worsening pain;Shaking chills;Temperature >100.4F;Uncontrolled diarrhea/constipation;Uncontrolled nausea/vomiting    Evaluation of Learning  Patient Education Provided: Yes  Readiness:: Acceptance  Method:: Booklet/Handout;Literature  Response:: Verbalizes understanding        Intervention/Education provided during outreach:                                                       Patient responded and expressed understanding and appreciation of above information. Will meet with MYNOR tomorrow, 8/27.    JERRY ZaldivarN, RN  RN Care Coordinator   [phone]

## 2024-08-27 ENCOUNTER — VIRTUAL VISIT (OUTPATIENT)
Dept: ONCOLOGY | Facility: CLINIC | Age: 54
End: 2024-08-27
Attending: STUDENT IN AN ORGANIZED HEALTH CARE EDUCATION/TRAINING PROGRAM
Payer: COMMERCIAL

## 2024-08-27 DIAGNOSIS — C81.03 NODULAR LYMPHOCYTE PREDOMINANT HODGKIN LYMPHOMA OF INTRA-ABDOMINAL LYMPH NODES (H): Primary | ICD-10-CM

## 2024-08-27 PROCEDURE — 99215 OFFICE O/P EST HI 40 MIN: CPT | Mod: 95 | Performed by: STUDENT IN AN ORGANIZED HEALTH CARE EDUCATION/TRAINING PROGRAM

## 2024-08-27 NOTE — NURSING NOTE
Current patient location: [id number] [place] [address] [address]  [facility] [address]    Is the patient currently in the state of [place]? YES    Visit mode:VIDEO    If the visit is dropped, the patient can be reconnected by: VIDEO VISIT: Send to e-mail at: [email or web address]    Will anyone else be joining the visit? NO  (If patient encounters technical issues they should call [phone]    How would you like to obtain your AVS? MyChart    Are changes needed to the allergy or medication list? Pt stated no changes to allergies and Pt stated no med changes    Are refills needed on medications prescribed by this physician? NO    Rooming Documentation:  Questionnaire(s) completed      Reason for visit: RECHECK    Darrin DRAKE

## 2024-08-27 NOTE — LETTER
8/27/2024      Pedrito Jenkins  [address]  [facility] [address]      Dear Colleague,    Thank you for referring your patient, Pedrito Jenkins, to the [facility]. Please see a copy of my visit note below.    Virtual Visit Details    Type of service:  Video Visit     Originating Location (pt. Location): Home    Distant Location (provider location):  On-site  Platform used for Video Visit: AmWell--then Doximity for last half of visit due to internet interruption      Subjective   Pedrito is a 53 year old male, presenting for follow up of nodular lymphocyte Hodgkin lymphoma.     [facility]     Oncology history copied from Dr. Lu's prior note:    Oncology History Overview Note   This is a 53-year-old man coming in for initial consultation of newly diagnosed nodular lymphocyte predominant Hodgkin lymphoma.    Patient had been in his usual state of health until June 2024 when he developed gradually progressive nausea and mild abdominal discomfort.  He also had some loose stools but not watery diarrhea.  The symptoms led to medical attention.  Initially an exhaustive workup for gastrointestinal pathology was done including IgA levels in the blood, enteric bacterial panel calprotectin and Helicobacter pylori in the stool, blood lipase levels, blood tissue transglutaminase levels, TSH, CRP, ESR as well as vitamin D and vitamin B12 levels.  Vitamin B12 and vitamin D levels were within normal limits.  Erythrocyte sedimentation rate was also within normal limits CRP was also within normal limits TSH was normal as well tissue transglutaminase antibodies were negative lipase levels were within normal limits, enteric bacterial panel was positive for enterotoxigenic E. coli, calprotectin was negative, H. pylori stool antigen was negative as well.  He was given an antibiotic for 1 dose and that did not resolve his symptoms.  He does not remember the name of it antibiotic.    A CT scan was done as well to  further investigate patient's nausea and abdominal pain.  That showed 5 cm x 4.7 cm x 5.3 cm enhancing soft tissue mass in the mesentery and there are scattered mesenteric lymphadenopathy surrounding this lesion and mesenteric stranding and haziness.  He subsequently saw Dr. Werner from general surgery and underwent laparoscopic biopsy of the mass on 6 August 2024.  Patient also had umbilical hernia that was repaired.  Patient recovered from the surgery without any major complications.  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  Subsequent PET scan done on 16th of August showed hypermetabolic mesenteric mass correlating with the prior CT scan with SUV max of 11.  There are scattered hypermetabolic lymphadenopathy in the mesentery surrounding the mass as well as mesenteric stranding. Patient does not have any major cytopenias on peripheral blood work as well as no renal or kidney abnormalities.      Nausea has resolved after surgery but patient still have very mild abdominal discomfort.  No high-grade fevers drenching night sweats.  Patient does feel fatigued.  Appetite is good.  ECOG performance status is 0-1.       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  -Pedrito reports feeling pretty well overall. Energy is ok maybe a bit lower than baseline but not debilitating and no changes since last visit  -No fever or night sweats  -No reduction in appetite  -No nausea or emesis  -No changes to baseline abdominal discomfort  -No rashes  -No issues with  "bowels or urination  -Maybe intermittent numbness/tingling to toes but he's not sure  -he picked up take home prescriptions  (allopurinol, acyclovir, prednisone, compazine, zofran)          Objective    There were no vitals taken for this visit.     8/19/2024  10:17 AM   Vital Signs    Systolic 138    Diastolic 85    Pulse 60    Temperature 98.1  F (36.7  C)    Respirations 15    Weight (LB) 194 lb    Height 5' 8.701\"    BMI (Calculated) 28.9    Pain Score 0 (None)    O2 98 %        Wt Readings from Last 4 Encounters:   08/19/24 88 kg (194 lb)   08/06/24 89 kg (196 lb 3.4 oz)   07/29/24 89 kg (196 lb 4.8 oz)   07/25/24 86.2 kg (190 lb)      Video physical exam  General: Patient appears well in no acute distress.   Skin: No visualized rash or lesions on visualized skin  Eyes: EOMI, no erythema, sclera icterus or discharge noted  Resp: Appears to be breathing comfortably without accessory muscle usage, speaking in full sentences, no cough  MSK: Appears to have normal range of motion based on visualized movements  Neurologic: No apparent tremors, facial movements symmetric. Hearing intact. No dysarthria  Psych: affect appropriate, alert and oriented. Pleasant    Labs: No labs today. I reviewed most recent CBC and CMP which are normal. I reviewed labs on 8/15/24 showing no active HIV or hepatitis infection    Imaging:   Echo on 8/16/24 with EF 60-65%    Assessment and Plan:    #Nodular lymphocyte predominant Hodgkin lymphoma:  Considering that patient is symptomatic from his disease and at high risk of transformation--the recommendation is to begin treatment with R-CHOP. He has 5 cm mesenteric mass with scattered other mesenteric lymph nodes, overall radiation leads to high toxicities in this area. The plan is to re-evaluate the role of radiation after he finishes chemotherapy based on how he responds.    We again reviewed today common toxicities of treatment.  Common toxicities including but not limited to " nausea/vomiting/diarrhea, rashes, neuropathy, and potentially hypersensitivity reaction with initial rituxan infusion. We also went over each of his take home meds and he recited instructions back to me. Patient verbalized understanding of the information and all his questions were answered.      Plan/Follow-Up:  -Plan for 4-6 cycles of R-CHOP. Ok to proceed with cycle 1 tomorrow pending labs prior  -Virtual MYNOR for toxicity check in 7-10 days. He knows to reach out sooner with any concerns.  -MYNOR visits prior to each cycle of R-CHOP (cycles are 21 days)  -PET scan after 2 cycles.    TLS prophylaxis  Allopurinol daily. He can start this tomorrow (day 1).     ID  #Prophylaxis  Acyclovir 400mg BID. He can start this tomorrow (day 1)     40 minutes spent on the date of the encounter doing chart review, review of test results, interpretation of tests, patient visit, and documentation     Amber Scheierl, CNP  [facility] [address] [address]  [phone]      Again, thank you for allowing me to participate in the care of your patient.        Sincerely,        Amber J. Scheierl, APRN CNP

## 2024-08-27 NOTE — PROGRESS NOTES
Virtual Visit Details    Type of service:  Video Visit     Originating Location (pt. Location): Home    Distant Location (provider location):  On-site  Platform used for Video Visit: Talib--then Patricia for last half of visit due to internet interruption      Winifred Major is a 53 year old male, presenting for follow up of nodular lymphocyte Hodgkin lymphoma.     HPI     Oncology history copied from Dr. Lu's prior note:    Oncology History Overview Note   This is a 53-year-old man coming in for initial consultation of newly diagnosed nodular lymphocyte predominant Hodgkin lymphoma.    Patient had been in his usual state of health until June 2024 when he developed gradually progressive nausea and mild abdominal discomfort.  He also had some loose stools but not watery diarrhea.  The symptoms led to medical attention.  Initially an exhaustive workup for gastrointestinal pathology was done including IgA levels in the blood, enteric bacterial panel calprotectin and Helicobacter pylori in the stool, blood lipase levels, blood tissue transglutaminase levels, TSH, CRP, ESR as well as vitamin D and vitamin B12 levels.  Vitamin B12 and vitamin D levels were within normal limits.  Erythrocyte sedimentation rate was also within normal limits CRP was also within normal limits TSH was normal as well tissue transglutaminase antibodies were negative lipase levels were within normal limits, enteric bacterial panel was positive for enterotoxigenic E. coli, calprotectin was negative, H. pylori stool antigen was negative as well.  He was given an antibiotic for 1 dose and that did not resolve his symptoms.  He does not remember the name of it antibiotic.    A CT scan was done as well to further investigate patient's nausea and abdominal pain.  That showed 5 cm x 4.7 cm x 5.3 cm enhancing soft tissue mass in the mesentery and there are scattered mesenteric lymphadenopathy surrounding this lesion and mesenteric stranding and  haziness.  He subsequently saw Dr. Werner from general surgery and underwent laparoscopic biopsy of the mass on 6 August 2024.  Patient also had umbilical hernia that was repaired.  Patient recovered from the surgery without any major complications.  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  Subsequent PET scan done on 16th of August showed hypermetabolic mesenteric mass correlating with the prior CT scan with SUV max of 11.  There are scattered hypermetabolic lymphadenopathy in the mesentery surrounding the mass as well as mesenteric stranding. Patient does not have any major cytopenias on peripheral blood work as well as no renal or kidney abnormalities.      Nausea has resolved after surgery but patient still have very mild abdominal discomfort.  No high-grade fevers drenching night sweats.  Patient does feel fatigued.  Appetite is good.  ECOG performance status is 0-1.       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  -Pedrito reports feeling pretty well overall. Energy is ok maybe a bit lower than baseline but not debilitating and no changes since last visit  -No fever or night sweats  -No reduction in appetite  -No nausea or emesis  -No changes to baseline abdominal discomfort  -No rashes  -No issues with bowels or urination  -Maybe intermittent numbness/tingling to toes but he's not sure  -he picked up take home prescriptions  (allopurinol, acyclovir, prednisone, compazine, zofran)          Objective    There were no vitals taken for this  "visit.     8/19/2024  10:17 AM   Vital Signs    Systolic 138    Diastolic 85    Pulse 60    Temperature 98.1  F (36.7  C)    Respirations 15    Weight (LB) 194 lb    Height 5' 8.701\"    BMI (Calculated) 28.9    Pain Score 0 (None)    O2 98 %        Wt Readings from Last 4 Encounters:   08/19/24 88 kg (194 lb)   08/06/24 89 kg (196 lb 3.4 oz)   07/29/24 89 kg (196 lb 4.8 oz)   07/25/24 86.2 kg (190 lb)      Video physical exam  General: Patient appears well in no acute distress.   Skin: No visualized rash or lesions on visualized skin  Eyes: EOMI, no erythema, sclera icterus or discharge noted  Resp: Appears to be breathing comfortably without accessory muscle usage, speaking in full sentences, no cough  MSK: Appears to have normal range of motion based on visualized movements  Neurologic: No apparent tremors, facial movements symmetric. Hearing intact. No dysarthria  Psych: affect appropriate, alert and oriented. Pleasant    Labs: No labs today. I reviewed most recent CBC and CMP which are normal. I reviewed labs on 8/15/24 showing no active HIV or hepatitis infection    Imaging:   Echo on 8/16/24 with EF 60-65%    Assessment and Plan:    #Nodular lymphocyte predominant Hodgkin lymphoma:  Considering that patient is symptomatic from his disease and at high risk of transformation--the recommendation is to begin treatment with R-CHOP. He has 5 cm mesenteric mass with scattered other mesenteric lymph nodes, overall radiation leads to high toxicities in this area. The plan is to re-evaluate the role of radiation after he finishes chemotherapy based on how he responds.    We again reviewed today common toxicities of treatment.  Common toxicities including but not limited to nausea/vomiting/diarrhea, rashes, neuropathy, and potentially hypersensitivity reaction with initial rituxan infusion. We also went over each of his take home meds and he recited instructions back to me. Patient verbalized understanding of the " information and all his questions were answered.      Plan/Follow-Up:  -Plan for 4-6 cycles of R-CHOP. Ok to proceed with cycle 1 tomorrow pending labs prior  -Virtual MYNOR for toxicity check in 7-10 days. He knows to reach out sooner with any concerns.  -MYNOR visits prior to each cycle of R-CHOP (cycles are 21 days)  -PET scan after 2 cycles.    TLS prophylaxis  Allopurinol daily. He can start this tomorrow (day 1).     ID  #Prophylaxis  Acyclovir 400mg BID. He can start this tomorrow (day 1)     40 minutes spent on the date of the encounter doing chart review, review of test results, interpretation of tests, patient visit, and documentation     Amber Scheierl, CNP  [facility]  [address] [place] [id number] [phone]

## 2024-08-28 ENCOUNTER — LAB (OUTPATIENT)
Dept: LAB | Facility: CLINIC | Age: 54
End: 2024-08-28
Attending: STUDENT IN AN ORGANIZED HEALTH CARE EDUCATION/TRAINING PROGRAM
Payer: COMMERCIAL

## 2024-08-28 ENCOUNTER — INFUSION THERAPY VISIT (OUTPATIENT)
Dept: ONCOLOGY | Facility: CLINIC | Age: 54
End: 2024-08-28
Attending: STUDENT IN AN ORGANIZED HEALTH CARE EDUCATION/TRAINING PROGRAM
Payer: COMMERCIAL

## 2024-08-28 VITALS
HEIGHT: 69 IN | HEART RATE: 57 BPM | WEIGHT: 195 LBS | OXYGEN SATURATION: 98 % | BODY MASS INDEX: 28.88 KG/M2 | SYSTOLIC BLOOD PRESSURE: 132 MMHG | RESPIRATION RATE: 18 BRPM | DIASTOLIC BLOOD PRESSURE: 86 MMHG | TEMPERATURE: 98.2 F

## 2024-08-28 DIAGNOSIS — C81.03 NODULAR LYMPHOCYTE PREDOMINANT HODGKIN LYMPHOMA OF INTRA-ABDOMINAL LYMPH NODES (H): Primary | ICD-10-CM

## 2024-08-28 LAB
ALBUMIN SERPL BCG-MCNC: 4.5 G/DL (ref 3.5–5.2)
ALP SERPL-CCNC: 108 U/L (ref 40–150)
ALT SERPL W P-5'-P-CCNC: 28 U/L (ref 0–70)
ANION GAP SERPL CALCULATED.3IONS-SCNC: 13 MMOL/L (ref 7–15)
AST SERPL W P-5'-P-CCNC: 30 U/L (ref 0–45)
BASOPHILS # BLD AUTO: 0.1 10E3/UL (ref 0–0.2)
BASOPHILS NFR BLD AUTO: 1 %
BILIRUB SERPL-MCNC: 0.4 MG/DL
BUN SERPL-MCNC: 17.9 MG/DL (ref 6–20)
CALCIUM SERPL-MCNC: 9.6 MG/DL (ref 8.8–10.4)
CHLORIDE SERPL-SCNC: 105 MMOL/L (ref 98–107)
CREAT SERPL-MCNC: 0.95 MG/DL (ref 0.67–1.17)
EGFRCR SERPLBLD CKD-EPI 2021: >90 ML/MIN/1.73M2
EOSINOPHIL # BLD AUTO: 0.3 10E3/UL (ref 0–0.7)
EOSINOPHIL NFR BLD AUTO: 5 %
ERYTHROCYTE [DISTWIDTH] IN BLOOD BY AUTOMATED COUNT: 12 % (ref 10–15)
GLUCOSE SERPL-MCNC: 98 MG/DL (ref 70–99)
HCO3 SERPL-SCNC: 22 MMOL/L (ref 22–29)
HCT VFR BLD AUTO: 41.7 % (ref 40–53)
HGB BLD-MCNC: 14.8 G/DL (ref 13.3–17.7)
IMM GRANULOCYTES # BLD: 0 10E3/UL
IMM GRANULOCYTES NFR BLD: 0 %
LYMPHOCYTES # BLD AUTO: 1.2 10E3/UL (ref 0.8–5.3)
LYMPHOCYTES NFR BLD AUTO: 19 %
MCH RBC QN AUTO: 30.6 PG (ref 26.5–33)
MCHC RBC AUTO-ENTMCNC: 35.5 G/DL (ref 31.5–36.5)
MCV RBC AUTO: 86 FL (ref 78–100)
MONOCYTES # BLD AUTO: 0.6 10E3/UL (ref 0–1.3)
MONOCYTES NFR BLD AUTO: 10 %
NEUTROPHILS # BLD AUTO: 4.2 10E3/UL (ref 1.6–8.3)
NEUTROPHILS NFR BLD AUTO: 65 %
NRBC # BLD AUTO: 0 10E3/UL
NRBC BLD AUTO-RTO: 0 /100
PLATELET # BLD AUTO: 231 10E3/UL (ref 150–450)
POTASSIUM SERPL-SCNC: 4 MMOL/L (ref 3.4–5.3)
PROT SERPL-MCNC: 6.8 G/DL (ref 6.4–8.3)
RBC # BLD AUTO: 4.83 10E6/UL (ref 4.4–5.9)
SODIUM SERPL-SCNC: 140 MMOL/L (ref 135–145)
WBC # BLD AUTO: 6.4 10E3/UL (ref 4–11)

## 2024-08-28 PROCEDURE — 36415 COLL VENOUS BLD VENIPUNCTURE: CPT | Performed by: STUDENT IN AN ORGANIZED HEALTH CARE EDUCATION/TRAINING PROGRAM

## 2024-08-28 PROCEDURE — 85004 AUTOMATED DIFF WBC COUNT: CPT | Performed by: STUDENT IN AN ORGANIZED HEALTH CARE EDUCATION/TRAINING PROGRAM

## 2024-08-28 PROCEDURE — 96411 CHEMO IV PUSH ADDL DRUG: CPT

## 2024-08-28 PROCEDURE — 250N000011 HC RX IP 250 OP 636: Performed by: STUDENT IN AN ORGANIZED HEALTH CARE EDUCATION/TRAINING PROGRAM

## 2024-08-28 PROCEDURE — 96415 CHEMO IV INFUSION ADDL HR: CPT

## 2024-08-28 PROCEDURE — 258N000003 HC RX IP 258 OP 636: Performed by: STUDENT IN AN ORGANIZED HEALTH CARE EDUCATION/TRAINING PROGRAM

## 2024-08-28 PROCEDURE — 250N000013 HC RX MED GY IP 250 OP 250 PS 637: Performed by: STUDENT IN AN ORGANIZED HEALTH CARE EDUCATION/TRAINING PROGRAM

## 2024-08-28 PROCEDURE — 96367 TX/PROPH/DG ADDL SEQ IV INF: CPT

## 2024-08-28 PROCEDURE — 96375 TX/PRO/DX INJ NEW DRUG ADDON: CPT

## 2024-08-28 PROCEDURE — 96413 CHEMO IV INFUSION 1 HR: CPT

## 2024-08-28 PROCEDURE — 80053 COMPREHEN METABOLIC PANEL: CPT | Performed by: STUDENT IN AN ORGANIZED HEALTH CARE EDUCATION/TRAINING PROGRAM

## 2024-08-28 PROCEDURE — 96417 CHEMO IV INFUS EACH ADDL SEQ: CPT

## 2024-08-28 RX ORDER — ACETAMINOPHEN 325 MG/1
650 TABLET ORAL ONCE
Status: COMPLETED | OUTPATIENT
Start: 2024-08-28 | End: 2024-08-28

## 2024-08-28 RX ORDER — DOXORUBICIN HYDROCHLORIDE 2 MG/ML
50 INJECTION, SOLUTION INTRAVENOUS ONCE
Status: COMPLETED | OUTPATIENT
Start: 2024-08-28 | End: 2024-08-28

## 2024-08-28 RX ORDER — PALONOSETRON 0.05 MG/ML
0.25 INJECTION, SOLUTION INTRAVENOUS ONCE
Status: COMPLETED | OUTPATIENT
Start: 2024-08-28 | End: 2024-08-28

## 2024-08-28 RX ORDER — DIPHENHYDRAMINE HCL 25 MG
50 CAPSULE ORAL ONCE
Status: COMPLETED | OUTPATIENT
Start: 2024-08-28 | End: 2024-08-28

## 2024-08-28 RX ADMIN — PALONOSETRON HYDROCHLORIDE 0.25 MG: 0.25 INJECTION INTRAVENOUS at 08:03

## 2024-08-28 RX ADMIN — SODIUM CHLORIDE 250 ML: 9 INJECTION, SOLUTION INTRAVENOUS at 08:03

## 2024-08-28 RX ADMIN — DOXORUBICIN HYDROCHLORIDE 100 MG: 2 INJECTION, SOLUTION INTRAVENOUS at 08:59

## 2024-08-28 RX ADMIN — VINCRISTINE SULFATE 2 MG: 1 INJECTION, SOLUTION INTRAVENOUS at 09:36

## 2024-08-28 RX ADMIN — CYCLOPHOSPHAMIDE 1500 MG: 1 INJECTION, POWDER, FOR SOLUTION INTRAVENOUS; ORAL at 09:45

## 2024-08-28 RX ADMIN — DIPHENHYDRAMINE HYDROCHLORIDE 50 MG: 25 CAPSULE ORAL at 09:42

## 2024-08-28 RX ADMIN — FOSAPREPITANT 150 MG: 150 INJECTION, POWDER, LYOPHILIZED, FOR SOLUTION INTRAVENOUS at 08:07

## 2024-08-28 RX ADMIN — RITUXIMAB-ABBS 800 MG: 10 INJECTION, SOLUTION INTRAVENOUS at 10:37

## 2024-08-28 RX ADMIN — ACETAMINOPHEN 650 MG: 325 TABLET ORAL at 09:42

## 2024-08-28 ASSESSMENT — PAIN SCALES - GENERAL: PAINLEVEL: NO PAIN (0)

## 2024-08-28 NOTE — PROGRESS NOTES
Infusion Nursing Note:  Pedrito Jenkins presents today for Cycle 1 Day 1 Rituxan, Adriamycin, Vincristine, Cytoxan, Prednisone.    Patient seen by provider today: Guadalupe TAN CNP yesterday 8/27 via video.   present during visit today: Not Applicable.    Note: Oriented to infusion space and call light. Patient has been provided written information on new chemotherapy. Side effects reviewed and all questions answered. Patient is aware to call [facility] with questions or concerns and with a temperature of 100.5 or greater.  .      Intravenous Access:  Peripheral IV placed 1st one in lab, second one by vascular access in infusion.     Treatment Conditions:  Lab Results   Component Value Date    HGB 14.8 08/28/2024    WBC 6.4 08/28/2024    ANEUTAUTO 4.2 08/28/2024     08/28/2024        Lab Results   Component Value Date     08/28/2024    POTASSIUM 4.0 08/28/2024    CR 0.95 08/28/2024    NEY 9.6 08/28/2024    BILITOTAL 0.4 08/28/2024    ALBUMIN 4.5 08/28/2024    ALT 28 08/28/2024    AST 30 08/28/2024       Results reviewed, labs MET treatment parameters, ok to proceed with treatment.  ECHO/MUGA completed 8/16/24  EF 60-65%.      Post Infusion Assessment:  Patient tolerated infusion without incident.  Blood return noted pre and post infusion.  Blood return noted during administration every 2 ml during vesicants.  Site patent and intact, free from redness, edema or discomfort.  No evidence of extravasations.  Access discontinued per protocol.   2nd PIV started by vascular access mid way through adriamycin to ensure brisk blood return.     Discharge Plan:   Patient declined prescription refills.  Not using Neulasta, noted on treatment plan.   Patient already has his compazine, zofran, allopurinol, prednisone, and acyclovir.  AVS to patient via [employer].  Patient will return 9/4 for MYNOR and 9/18 for next chemo appointment.   Patient discharged in stable condition accompanied by: wife.  Departure  Mode: Ambulatory.      Wendy Collazo RN

## 2024-08-28 NOTE — NURSING NOTE
Chief Complaint   Patient presents with    Blood Draw     Labs drawn with PIV start by RN.      Labs drawn with PIV start by RN. Pt tolerated well. Vitals taken. Pt checked into next appointment.    Iesha Otero RN

## 2024-08-30 NOTE — TELEPHONE ENCOUNTER
FMLA forms filled out and put in providers folder for review and signature.      Blessing Cotton

## 2024-09-03 NOTE — PROGRESS NOTES
Virtual Visit Details    Type of service:  Video Visit     Originating Location (pt. Location): Home    Distant Location (provider location):  On-site  Platform used for Video Visit: [facility]  Date of visit: 09/04/2024      Reason for Visit: Follow-up for nodular lymphocyte predominant Hodgkin lymphoma      Oncology HPI:  Pedrito is a 53 year old man being seen for nodular lymphocyte predominant Hodgkin lymphoma. He had been in his usual state of health until June, 2024 when he developed gradually progressive nausea and mild abdominal discomfort.  He also had some loose stools but not watery diarrhea.  The symptoms led to medical attention.  Initially an exhaustive workup for gastrointestinal pathology was done including IgA levels in the blood, enteric bacterial panel calprotectin and Helicobacter pylori in the stool, blood lipase levels, blood tissue transglutaminase levels, TSH, CRP, ESR as well as vitamin D and vitamin B12 levels.  Vitamin B12 and vitamin D levels were within normal limits.  Erythrocyte sedimentation rate , CRP,TSH, tissue transglutamine antibodies, and lipase were within WNL. Enteric bacterial panel was positive for enterotoxigenic E. Coli.  Calprotectin was negative, H. pylori stool antigen was negative as well.  He was given an antibiotic for 1 dose that did not resolve his symptoms- he did not remember the name of it antibiotic.    7/23/24 A CT scan was done as well to further investigate patient's nausea and abdominal pain.  That showed 5 cm x 4.7 cm x 5.3 cm enhancing soft tissue mass in the mesentery and there are scattered mesenteric lymphadenopathy surrounding this lesion and mesenteric stranding and haziness.  He subsequently saw Dr. Werner from general surgery and underwent laparoscopic biopsy of the mass on 8/6/24.  Patient also had umbilical hernia that was repaired.  Patient recovered from the surgery without any major complications.   "Pathology showed nodular lymphocyte predominant Hodgkin lymphoma.  Subsequent PET scan done on 8/16/24 showed hypermetabolic mesenteric mass correlating with the prior CT scan with SUV max of 11.  There are scattered hypermetabolic lymphadenopathy in the mesentery surrounding the mass as well as mesenteric stranding. Patient does not have any major cytopenias on peripheral blood work as well as no renal or kidney abnormalities.      Nausea has resolved after surgery but patient still have very mild abdominal discomfort.  No high-grade fevers drenching night sweats.  Patient does feel fatigued.  Appetite is good.  ECOG performance status is 0-1.    Proceeded with treatment with R-CHOP (C1D1=8/28/24).     Interval history:   Pedrito presents to clinic for toxicity check after receiving C1 R-CHOP via video visit.  He is overall doing well and felt his first round of chemotherapy went well.  He finished his prednisone and is feeling \"out of sorts.\"  He did have some difficulty sleeping while on the steroids but continues to not sleep well.  He has not tried anything for sleep yet. He is having difficulty falling and staying asleep.  His energy levels have been a little low but he has been able to go for walks and mow his lawn.  He continues to be able to work.    He did have some constipation which he relieved with Miralax.  His appetite is good and is eating and drinking well.  He is having some mild brain fog and difficulty concentrating.  Feels this may be due to feeling jittery.   He had some numbness in his toes today- unsure if this is due to how he was sitting.   Went on a walk in the woods and scratched himself and near the scratch he has a lump on the L arm. There is some tenderness when he presses on the area.  No redness or drainage.  Denies fevers/chills, night sweats, N/V, bleeding issues, new pains,  all other acute issues or concerns.    Current Outpatient Medications   Medication Sig Dispense Refill    " "acyclovir (ZOVIRAX) 400 MG tablet Take 1 tablet (400 mg) by mouth every 12 hours. 60 tablet 11    allopurinol (ZYLOPRIM) 300 MG tablet Take 1 tablet (300 mg) by mouth daily. 30 tablet 3    ondansetron (ZOFRAN) 8 MG tablet Take 1 tablet (8 mg) by mouth every 8 hours as needed for nausea (vomiting). Do not start before August 25, 2024. 30 tablet 2    predniSONE (DELTASONE) 10 MG tablet TAKE 5 TABLETS (50 MG) BY MOUTH 2 TIMES DAILY FOR 5 DAYS. TAKE ON DAYS 1 THROUGH 5. TAKE FIRST DOSE IN AM PRIOR TO CHEMOTHERAPY. DO NOT START BEFORE AUGUST 25, 2024.*      SENNA-docusate sodium (SENNA S) 8.6-50 MG tablet Take 1 tablet by mouth at bedtime. 30 tablet 1    acetaminophen (TYLENOL) 325 MG tablet Take 2 tablets (650 mg) by mouth every 4 hours as needed for mild pain      prochlorperazine (COMPAZINE) 10 MG tablet Take 1 tablet (10 mg) by mouth every 6 hours as needed for nausea or vomiting. Do not start before August 25, 2024. 30 tablet 2       No Known Allergies    Physical Video Exam:  Ht 1.753 m (5' 9\")   Wt 87.5 kg (193 lb)   BMI 28.50 kg/m    General: patient appears well in no acute distress, alert and oriented, speech clear and fluid  Skin: no visualized rash or lesions on visualized skin  Resp: Appears to be breathing comfortably without accessory muscle usage, speaking in full sentences, no audible wheezes or cough.  Psych: Coherent speech, normal rate and volume, able to articulate logical thoughts, able to abstract reason, no tangential thoughts, no hallucinations or delusions  Patient's affect is appropriate.    Labs:   Most Recent 3 CBC's:  Recent Labs   Lab Test 08/28/24  0657 06/21/24  0853 01/29/24  0803   WBC 6.4 5.9 6.4   HGB 14.8 14.6 15.0   MCV 86 88 89    223 216   ANEUTAUTO 4.2 3.9  --      Most Recent 3 BMP's:  Recent Labs   Lab Test 08/28/24  0657 08/16/24  0711 08/15/24  1105 06/21/24  0853     --  139 139   POTASSIUM 4.0  --  4.4 4.4   CHLORIDE 105  --  103 105   CO2 22  --  25 25   BUN " 17.9  --  16.4 14.9   CR 0.95  --  0.99 1.04   ANIONGAP 13  --  11 9   NEY 9.6  --  9.6 9.7   GLC 98 107* 95 97   PROTTOTAL 6.8  --  7.0 6.6   ALBUMIN 4.5  --  4.6 4.5    Most Recent 3 LFT's:  Recent Labs   Lab Test 08/28/24  0657 08/15/24  1105 06/21/24  0853   AST 30 28 24   ALT 28 25 17   ALKPHOS 108 103 93   BILITOTAL 0.4 0.5 0.5    Most Recent 2 TSH and T4:  Recent Labs   Lab Test 06/21/24  0853   TSH 2.17     I reviewed the above labs today.      Impression/plan:     Nodular lymphocyte predominant Hodgkin lymphoma:  - Previously discussed in detail natural history, prognosis, management of nodular lymphocyte predominant Hodgkin lymphoma. Discussed that NLPHL can have indolent or aggressive presentation and relapses have been reported after long term follow up. Subdiaphragmatic presentations are commonly associated with transformation to diffuse large B cell lymphoma. Rituximab monotherapy has been associated with more than 50% likelihood of relapse over short follow up of 3-5 years. Considering that patient is symptomatic from his disease, at high risk of transformation, we elected to treat with RCHOP.   RCHOP,  in a phase 2 trial of NLPHL, has shown high CR of 89% rates, 88.5% 5-year PFS and  reduced risk of transformation.  He had 5 cm mesenteric mass with scattered other mesenteric lymph nodes, overall radiation leads to high toxicities in this area.  We will explore the role of radiation after he finishes chemotherapy based on how he responds.  -Plan for 4-6 cycles of R-CHOP.  - R-CHOP (C1D1=8/28/24)  - S/p C1 and tolerating treatment well.  He did experience constipation which resolved with constipation. He had some difficulty sleeping he believes is due to the steroids.  - PET scan after 2 cycles- scheduled 9/27    Ppx:  - Acyclovir 400 mg BID    Constipation:  D/t chemo.  Managed with Miralax     Difficulty Sleeping:  D/t anxiety and steroids?  Difficulty falling and staying asleep.  - Will trial  melatonin 5 mg at bedtime    22 minutes spent on the date of the encounter doing chart review, review of test results, interpretation of tests, patient visit, and documentation     PARAS Hamilton CNP  [facility] [address] [address] [phone]

## 2024-09-04 ENCOUNTER — VIRTUAL VISIT (OUTPATIENT)
Dept: ONCOLOGY | Facility: CLINIC | Age: 54
End: 2024-09-04
Payer: COMMERCIAL

## 2024-09-04 ENCOUNTER — TELEPHONE (OUTPATIENT)
Dept: ONCOLOGY | Facility: CLINIC | Age: 54
End: 2024-09-04

## 2024-09-04 VITALS — HEIGHT: 69 IN | WEIGHT: 193 LBS | BODY MASS INDEX: 28.58 KG/M2

## 2024-09-04 DIAGNOSIS — K59.03 DRUG-INDUCED CONSTIPATION: ICD-10-CM

## 2024-09-04 DIAGNOSIS — G47.9 DIFFICULTY SLEEPING: ICD-10-CM

## 2024-09-04 DIAGNOSIS — C81.03 NODULAR LYMPHOCYTE PREDOMINANT HODGKIN LYMPHOMA OF INTRA-ABDOMINAL LYMPH NODES (H): Primary | ICD-10-CM

## 2024-09-04 PROCEDURE — 99214 OFFICE O/P EST MOD 30 MIN: CPT | Mod: 95

## 2024-09-04 RX ORDER — PREDNISONE 10 MG/1
TABLET ORAL
COMMUNITY
Start: 2024-08-21

## 2024-09-04 RX ORDER — SENNA AND DOCUSATE SODIUM 50; 8.6 MG/1; MG/1
1 TABLET, FILM COATED ORAL AT BEDTIME
Qty: 30 TABLET | Refills: 1 | Status: SHIPPED | OUTPATIENT
Start: 2024-09-04

## 2024-09-04 ASSESSMENT — PAIN SCALES - GENERAL: PAINLEVEL: NO PAIN (0)

## 2024-09-04 NOTE — TELEPHONE ENCOUNTER
FMLA forms received via RightFax from [facility] [employer].      Forms to be completed and put in folder for provider to approve.    Fax #:  [phone]  Claim: [id number]    Allyn Ayala

## 2024-09-04 NOTE — LETTER
9/4/2024      Pedrito Jenkins  [address]  [place] [address]      Dear Colleague,    Thank you for referring your patient, Pedrito Jenkins, to the [facility]. Please see a copy of my visit note below.    Virtual Visit Details    Type of service:  Video Visit     Originating Location (pt. 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 He had been in his usual state of health until June, 2024 when he developed gradually progressive nausea and mild abdominal discomfort.  He also had some loose stools but not watery diarrhea.  The symptoms led to medical attention.  Initially an exhaustive workup for gastrointestinal pathology was done including IgA levels in the blood, enteric bacterial panel calprotectin and Helicobacter pylori in the stool, blood lipase levels, blood tissue transglutaminase levels, TSH, CRP, ESR as well as vitamin D and vitamin B12 levels.  Vitamin B12 and vitamin D levels were within normal limits.  Erythrocyte sedimentation rate , CRP,TSH, tissue transglutamine antibodies, and lipase were within WNL. Enteric bacterial panel was positive for enterotoxigenic E. Coli.  Calprotectin was negative, H. pylori stool antigen was negative as well.  He was given an antibiotic for 1 dose that did not resolve his symptoms- he did not remember the name of it antibiotic.    7/23/24 A CT scan was done as well to further investigate patient's nausea and abdominal pain.  That showed 5 cm x 4.7 cm x 5.3 cm enhancing soft tissue mass in the mesentery and there are scattered mesenteric lymphadenopathy surrounding this lesion and mesenteric stranding and haziness.  " He subsequently saw Dr. Werner from general surgery and underwent laparoscopic biopsy of the mass on 8/6/24.  Patient also had umbilical hernia that was repaired.  Patient recovered from the surgery without any major complications.  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  Subsequent PET scan done on 8/16/24 showed hypermetabolic mesenteric mass correlating with the prior CT scan with SUV max of 11.  There are scattered hypermetabolic lymphadenopathy in the mesentery surrounding the mass as well as mesenteric stranding. Patient does not have any major cytopenias on peripheral blood work as well as no renal or kidney abnormalities.      Nausea has resolved after surgery but patient still have very mild abdominal discomfort.  No high-grade fevers drenching night sweats.  Patient does feel fatigued.  Appetite is good.  ECOG performance status is 0-1.    Proceeded with treatment with R-CHOP (C1D1=8/28/24).     Interval history:   Pedrito presents to clinic for toxicity check after receiving C1 R-CHOP via video visit.  He is overall doing well and felt his first round of chemotherapy went well.  He finished his prednisone and is feeling \"out of sorts.\"  He did have some difficulty sleeping while on the steroids but continues to not sleep well.  He has not tried anything for sleep yet. He is having difficulty falling and staying asleep.  His energy levels have been a little low but he has been able to go for walks and mow his lawn.  He continues to be able to work.    He did have some constipation which he relieved with Miralax.  His appetite is good and is eating and drinking well.  He is having some mild brain fog and difficulty concentrating.  Feels this may be due to feeling jittery.   He had some numbness in his toes today- unsure if this is due to how he was sitting.   Went on a walk in the woods and scratched himself and near the scratch he has a lump on the L arm. There is some tenderness " "when he presses on the area.  No redness or drainage.  Denies fevers/chills, night sweats, N/V, bleeding issues, new pains,  all other acute issues or concerns.    Current Outpatient Medications   Medication Sig Dispense Refill     acyclovir (ZOVIRAX) 400 MG tablet Take 1 tablet (400 mg) by mouth every 12 hours. 60 tablet 11     allopurinol (ZYLOPRIM) 300 MG tablet Take 1 tablet (300 mg) by mouth daily. 30 tablet 3     ondansetron (ZOFRAN) 8 MG tablet Take 1 tablet (8 mg) by mouth every 8 hours as needed for nausea (vomiting). Do not start before August 25, 2024. 30 tablet 2     predniSONE (DELTASONE) 10 MG tablet TAKE 5 TABLETS (50 MG) BY MOUTH 2 TIMES DAILY FOR 5 DAYS. TAKE ON DAYS 1 THROUGH 5. TAKE FIRST DOSE IN AM PRIOR TO CHEMOTHERAPY. DO NOT START BEFORE AUGUST 25, 2024.*       SENNA-docusate sodium (SENNA S) 8.6-50 MG tablet Take 1 tablet by mouth at bedtime. 30 tablet 1     acetaminophen (TYLENOL) 325 MG tablet Take 2 tablets (650 mg) by mouth every 4 hours as needed for mild pain       prochlorperazine (COMPAZINE) 10 MG tablet Take 1 tablet (10 mg) by mouth every 6 hours as needed for nausea or vomiting. Do not start before August 25, 2024. 30 tablet 2       No Known Allergies    Physical Video Exam:  Ht 1.753 m (5' 9\")   Wt 87.5 kg (193 lb)   BMI 28.50 kg/m    General: patient appears well in no acute distress, alert and oriented, speech clear and fluid  Skin: no visualized rash or lesions on visualized skin  Resp: Appears to be breathing comfortably without accessory muscle usage, speaking in full sentences, no audible wheezes or cough.  Psych: Coherent speech, normal rate and volume, able to articulate logical thoughts, able to abstract reason, no tangential thoughts, no hallucinations or delusions  Patient's affect is appropriate.    Labs:   Most Recent 3 CBC's:  Recent Labs   Lab Test 08/28/24  0657 06/21/24  0853 01/29/24  0803   WBC 6.4 5.9 6.4   HGB 14.8 14.6 15.0   MCV 86 88 89    223 216 "   ANEUTAUTO 4.2 3.9  --      Most Recent 3 BMP's:  Recent Labs   Lab Test 08/28/24  0657 08/16/24  0711 08/15/24  1105 06/21/24  0853     --  139 139   POTASSIUM 4.0  --  4.4 4.4   CHLORIDE 105  --  103 105   CO2 22  --  25 25   BUN 17.9  --  16.4 14.9   CR 0.95  --  0.99 1.04   ANIONGAP 13  --  11 9   NEY 9.6  --  9.6 9.7   GLC 98 107* 95 97   PROTTOTAL 6.8  --  7.0 6.6   ALBUMIN 4.5  --  4.6 4.5    Most Recent 3 LFT's:  Recent Labs   Lab Test 08/28/24  0657 08/15/24  1105 06/21/24  0853   AST 30 28 24   ALT 28 25 17   ALKPHOS 108 103 93   BILITOTAL 0.4 0.5 0.5    Most Recent 2 TSH and T4:  Recent Labs   Lab Test 06/21/24  0853   TSH 2.17     I reviewed the above labs today.      Impression/plan:     Nodular lymphocyte predominant Hodgkin lymphoma:  - Previously discussed in detail natural history, prognosis, management of nodular lymphocyte predominant Hodgkin lymphoma. Discussed that NLPHL can have indolent or aggressive presentation and relapses have been reported after long term follow up. Subdiaphragmatic presentations are commonly associated with transformation to diffuse large B cell lymphoma. Rituximab monotherapy has been associated with more than 50% likelihood of relapse over short follow up of 3-5 years. Considering that patient is symptomatic from his disease, at high risk of transformation, we elected to treat with RCHOP.   RCHOP,  in a phase 2 trial of NLPHL, has shown high CR of 89% rates, 88.5% 5-year PFS and  reduced risk of transformation.  He had 5 cm mesenteric mass with scattered other mesenteric lymph nodes, overall radiation leads to high toxicities in this area.  We will explore the role of radiation after he finishes chemotherapy based on how he responds.  -Plan for 4-6 cycles of R-CHOP.  - R-CHOP (C1D1=8/28/24)  - S/p C1 and tolerating treatment well.  He did experience constipation which resolved with constipation. He had some difficulty sleeping he believes is due to the  steroids.  - PET scan after 2 cycles- scheduled 9/27    Ppx:  - Acyclovir 400 mg BID    Constipation:  D/t chemo.  Managed with Miralax     Difficulty Sleeping:  D/t anxiety and steroids?  Difficulty falling and staying asleep.  - Will trial melatonin 5 mg at bedtime    22 minutes spent on the date of the encounter doing chart review, review of test results, interpretation of tests, patient visit, and documentation     PARAS Hamilton CNP  [facility] [address] [id number] [phone]      Again, thank you for allowing me to participate in the care of your patient.        Sincerely,        PARAS Hamilton CNP

## 2024-09-04 NOTE — NURSING NOTE
Current patient location: [id number] [place] [address] [address]  [facility] [address]    Is the patient currently in the state of [place]? YES    Visit mode:VIDEO    If the visit is dropped, the patient can be reconnected by: VIDEO VISIT: Text to cell phone:   Telephone Information:   Mobile [phone]    and VIDEO VISIT: Send to e-mail at: [email or web address]    Will anyone else be joining the visit? NO  (If patient encounters technical issues they should call [phone])    How would you like to obtain your AVS? MyChart    Are changes needed to the allergy or medication list? No    Are refills needed on medications prescribed by this physician? NO    Rooming Documentation:  Unable to complete questionnaire(s) due to time      Reason for visit: RECHECK (Documentation for FMLA and such. )    Carolyn DRAKE

## 2024-09-06 NOTE — TELEPHONE ENCOUNTER
FMLA paperwork completed, checked for accuracy, signed and faxed to [employer] @ [phone]. A copy was made, sent to scanning and original mailed to patient at home address.    Successful transmission verified in Right Fax.      STD paperwork completed, checked for accuracy, signed and faxed to [employer] @ [phone]. A copy was made, sent to scanning and original mailed to patient at home address.    Successful transmission verified in Right Fax.      Celi Ayala

## 2024-09-17 NOTE — PROGRESS NOTES
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 He had been in his usual state of health until June, 2024 when he developed gradually progressive nausea and mild abdominal discomfort.  He also had some loose stools but not watery diarrhea.  The symptoms led to medical attention.  Initially an exhaustive workup for gastrointestinal pathology was done including IgA levels in the blood, enteric bacterial panel calprotectin and Helicobacter pylori in the stool, blood lipase levels, blood tissue transglutaminase levels, TSH, CRP, ESR as well as vitamin D and vitamin B12 levels.  Vitamin B12 and vitamin D levels were within normal limits.  Erythrocyte sedimentation rate , CRP,TSH, tissue transglutamine antibodies, and lipase were within WNL. Enteric bacterial panel was positive for enterotoxigenic E. Coli.  Calprotectin was negative, H. pylori stool antigen was negative as well.  He was given an antibiotic for 1 dose that did not resolve his symptoms- he did not remember the name of it antibiotic.    7/23/24 A CT scan was done as well to further investigate patient's nausea and abdominal pain.  That showed 5 cm x 4.7 cm x 5.3 cm enhancing soft tissue mass in the mesentery and there are scattered mesenteric lymphadenopathy surrounding this lesion and mesenteric stranding and haziness.  He subsequently saw Dr. Werner from general surgery and underwent laparoscopic biopsy of the mass on 8/6/24.  Patient also had umbilical hernia that was repaired.  Patient recovered from the surgery without any major complications.  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  Subsequent PET scan done on 8/16/24 showed hypermetabolic mesenteric mass correlating with the prior CT scan with SUV max of  11.  There are scattered hypermetabolic lymphadenopathy in the mesentery surrounding the mass as well as mesenteric stranding. Patient does not have any major cytopenias on peripheral blood work as well as no renal or kidney abnormalities.      Nausea has resolved after surgery but patient still have very mild abdominal discomfort.  No high-grade fevers drenching night sweats.  Patient does feel fatigued.  Appetite is good.  ECOG performance status is 0-1.    Proceeded with treatment with R-CHOP (C1D1=8/28/24).     Treatment History:  8/28/24 C1D1 R-CHOP  9/18/24 C2D1 R-CHOP    Interval history:   Pedrito presents to clinic for toxicity check prior to C2 R-CHOP accompanied by his wife Tarun.  He is doing well and has been having good energy levels this past week.  He has been walking the past week every day.  His appetite is good and is eating and drinking well.  Since the prednisone wore off he is having easier time concentrating.  He has continued to have difficulty sleeping nightly- he is going to try THC gummies.  His bowels have normalized and is no longer needing Miralax or Senna.  He had some mild intermittent numbness in his toes today- unsure if this is due to how he was sitting.   Denies fevers/chills, night sweats, N/V, bleeding issues, rashes.sores, new pains, all other acute issues or concerns.    Current Outpatient Medications   Medication Sig Dispense Refill    acetaminophen (TYLENOL) 325 MG tablet Take 2 tablets (650 mg) by mouth every 4 hours as needed for mild pain      acyclovir (ZOVIRAX) 400 MG tablet Take 1 tablet (400 mg) by mouth every 12 hours. 60 tablet 11    allopurinol (ZYLOPRIM) 300 MG tablet Take 1 tablet (300 mg) by mouth daily. 30 tablet 3    ondansetron (ZOFRAN) 8 MG tablet Take 1 tablet (8 mg) by mouth every 8 hours as needed for nausea (vomiting). Do not start before August 25, 2024. 30 tablet 2    predniSONE (DELTASONE) 10 MG tablet TAKE 5 TABLETS (50 MG) BY MOUTH 2 TIMES DAILY FOR 5  DAYS. TAKE ON DAYS 1 THROUGH 5. TAKE FIRST DOSE IN AM PRIOR TO CHEMOTHERAPY. DO NOT START BEFORE AUGUST 25, 2024.*      prochlorperazine (COMPAZINE) 10 MG tablet Take 1 tablet (10 mg) by mouth every 6 hours as needed for nausea or vomiting. Do not start before August 25, 2024. 30 tablet 2    SENNA-docusate sodium (SENNA S) 8.6-50 MG tablet Take 1 tablet by mouth at bedtime. 30 tablet 1       No Known Allergies    Physical Video Exam:  /78   Pulse 54   Temp 97.6  F (36.4  C) (Oral)   Resp 16   Wt 88.9 kg (195 lb 14.4 oz)   SpO2 98%   BMI 28.93 kg/m    General: No acute distress, pleasant, well-groomed   HEENT: Sclera anicteric. Oral exam deferred  Lymph: No lymphadenopathy in neck  or supraclavicular areas  Heart: Regular, rate, and rhythm  Lungs: Clear to ascultation bilaterally  Extremities: no lower extremity edema  Neuro: Cranial nerves grossly intact  Skin: No rashes, sores, lesions, or unusual bruising on exposed skin   Vascular access: PIV      Labs:   Most Recent 3 CBC's:  Recent Labs   Lab Test 09/18/24  1000 08/28/24  0657 06/21/24  0853   WBC 3.8* 6.4 5.9   HGB 14.6 14.8 14.6   MCV 86 86 88    231 223   ANEUTAUTO 2.5 4.2 3.9     Most Recent 3 BMP's:  Recent Labs   Lab Test 08/28/24  0657 08/16/24  0711 08/15/24  1105 06/21/24  0853     --  139 139   POTASSIUM 4.0  --  4.4 4.4   CHLORIDE 105  --  103 105   CO2 22  --  25 25   BUN 17.9  --  16.4 14.9   CR 0.95  --  0.99 1.04   ANIONGAP 13  --  11 9   NEY 9.6  --  9.6 9.7   GLC 98 107* 95 97   PROTTOTAL 6.8  --  7.0 6.6   ALBUMIN 4.5  --  4.6 4.5    Most Recent 3 LFT's:  Recent Labs   Lab Test 08/28/24  0657 08/15/24  1105 06/21/24  0853   AST 30 28 24   ALT 28 25 17   ALKPHOS 108 103 93   BILITOTAL 0.4 0.5 0.5    Most Recent 2 TSH and T4:  Recent Labs   Lab Test 06/21/24  0853   TSH 2.17     I reviewed the above labs today.      Impression/plan:     Nodular lymphocyte predominant Hodgkin lymphoma:  - Previously discussed in detail  natural history, prognosis, management of nodular lymphocyte predominant Hodgkin lymphoma. Discussed that NLPHL can have indolent or aggressive presentation and relapses have been reported after long term follow up. Subdiaphragmatic presentations are commonly associated with transformation to diffuse large B cell lymphoma. Rituximab monotherapy has been associated with more than 50% likelihood of relapse over short follow up of 3-5 years. Considering that patient is symptomatic from his disease, at high risk of transformation, we elected to treat with RCHOP.   RCHOP,  in a phase 2 trial of NLPHL, has shown high CR of 89% rates, 88.5% 5-year PFS and  reduced risk of transformation.  He had 5 cm mesenteric mass with scattered other mesenteric lymph nodes, overall radiation leads to high toxicities in this area.  We will explore the role of radiation after he finishes chemotherapy based on how he responds.  -Plan for 4-6 cycles of R-CHOP.  - R-CHOP (C1D1=8/28/24)  - S/p C1 and tolerating treatment well.  His biggest complaint is difficulty sleeping and will try THC gummies  - Proceed with C2D1 R-CHOP today (9/18)   - PET scan after 2 cycles- scheduled 9/27 which is too early and requested it to be moved to 10/4 or 10/5    Ppx:  - Acyclovir 400 mg BID    Constipation- resolved:  D/t chemo.  Managed with Miralax and Senna    Difficulty Sleeping:  D/t anxiety and steroids?  Difficulty falling and staying asleep.  - Will trial THC gummies    18 minutes spent on the date of the encounter doing chart review, review of test results, interpretation of tests, patient visit, and documentation     PARAS Hamilton CNP  [facility] [address] [id number] [phone]

## 2024-09-18 ENCOUNTER — INFUSION THERAPY VISIT (OUTPATIENT)
Dept: ONCOLOGY | Facility: CLINIC | Age: 54
End: 2024-09-18
Attending: STUDENT IN AN ORGANIZED HEALTH CARE EDUCATION/TRAINING PROGRAM
Payer: COMMERCIAL

## 2024-09-18 ENCOUNTER — APPOINTMENT (OUTPATIENT)
Dept: LAB | Facility: CLINIC | Age: 54
End: 2024-09-18
Attending: STUDENT IN AN ORGANIZED HEALTH CARE EDUCATION/TRAINING PROGRAM
Payer: COMMERCIAL

## 2024-09-18 VITALS
OXYGEN SATURATION: 98 % | DIASTOLIC BLOOD PRESSURE: 78 MMHG | TEMPERATURE: 97.6 F | BODY MASS INDEX: 28.93 KG/M2 | SYSTOLIC BLOOD PRESSURE: 132 MMHG | HEART RATE: 54 BPM | WEIGHT: 195.9 LBS | RESPIRATION RATE: 16 BRPM

## 2024-09-18 DIAGNOSIS — C81.03 NODULAR LYMPHOCYTE PREDOMINANT HODGKIN LYMPHOMA OF INTRA-ABDOMINAL LYMPH NODES (H): Primary | ICD-10-CM

## 2024-09-18 DIAGNOSIS — G47.9 DIFFICULTY SLEEPING: ICD-10-CM

## 2024-09-18 DIAGNOSIS — K59.03 DRUG-INDUCED CONSTIPATION: ICD-10-CM

## 2024-09-18 LAB
ALBUMIN SERPL BCG-MCNC: 4.4 G/DL (ref 3.5–5.2)
ALP SERPL-CCNC: 86 U/L (ref 40–150)
ALT SERPL W P-5'-P-CCNC: 28 U/L (ref 0–70)
ANION GAP SERPL CALCULATED.3IONS-SCNC: 11 MMOL/L (ref 7–15)
AST SERPL W P-5'-P-CCNC: 27 U/L (ref 0–45)
BASOPHILS # BLD AUTO: 0 10E3/UL (ref 0–0.2)
BASOPHILS NFR BLD AUTO: 1 %
BILIRUB SERPL-MCNC: 0.3 MG/DL
BUN SERPL-MCNC: 14.6 MG/DL (ref 6–20)
CALCIUM SERPL-MCNC: 9.8 MG/DL (ref 8.8–10.4)
CHLORIDE SERPL-SCNC: 106 MMOL/L (ref 98–107)
CREAT SERPL-MCNC: 0.99 MG/DL (ref 0.67–1.17)
EGFRCR SERPLBLD CKD-EPI 2021: >90 ML/MIN/1.73M2
EOSINOPHIL # BLD AUTO: 0.1 10E3/UL (ref 0–0.7)
EOSINOPHIL NFR BLD AUTO: 2 %
ERYTHROCYTE [DISTWIDTH] IN BLOOD BY AUTOMATED COUNT: 12.6 % (ref 10–15)
GLUCOSE SERPL-MCNC: 110 MG/DL (ref 70–99)
HCO3 SERPL-SCNC: 22 MMOL/L (ref 22–29)
HCT VFR BLD AUTO: 40.3 % (ref 40–53)
HGB BLD-MCNC: 14.6 G/DL (ref 13.3–17.7)
IMM GRANULOCYTES # BLD: 0.1 10E3/UL
IMM GRANULOCYTES NFR BLD: 2 %
LYMPHOCYTES # BLD AUTO: 0.7 10E3/UL (ref 0.8–5.3)
LYMPHOCYTES NFR BLD AUTO: 18 %
MCH RBC QN AUTO: 31.3 PG (ref 26.5–33)
MCHC RBC AUTO-ENTMCNC: 36.2 G/DL (ref 31.5–36.5)
MCV RBC AUTO: 86 FL (ref 78–100)
MONOCYTES # BLD AUTO: 0.4 10E3/UL (ref 0–1.3)
MONOCYTES NFR BLD AUTO: 11 %
NEUTROPHILS # BLD AUTO: 2.5 10E3/UL (ref 1.6–8.3)
NEUTROPHILS NFR BLD AUTO: 67 %
NRBC # BLD AUTO: 0 10E3/UL
NRBC BLD AUTO-RTO: 0 /100
PLATELET # BLD AUTO: 219 10E3/UL (ref 150–450)
POTASSIUM SERPL-SCNC: 4.1 MMOL/L (ref 3.4–5.3)
PROT SERPL-MCNC: 6.6 G/DL (ref 6.4–8.3)
RBC # BLD AUTO: 4.67 10E6/UL (ref 4.4–5.9)
SODIUM SERPL-SCNC: 139 MMOL/L (ref 135–145)
WBC # BLD AUTO: 3.8 10E3/UL (ref 4–11)

## 2024-09-18 PROCEDURE — 258N000003 HC RX IP 258 OP 636

## 2024-09-18 PROCEDURE — 96417 CHEMO IV INFUS EACH ADDL SEQ: CPT

## 2024-09-18 PROCEDURE — 99215 OFFICE O/P EST HI 40 MIN: CPT

## 2024-09-18 PROCEDURE — 99213 OFFICE O/P EST LOW 20 MIN: CPT | Mod: 25

## 2024-09-18 PROCEDURE — 85025 COMPLETE CBC W/AUTO DIFF WBC: CPT | Performed by: STUDENT IN AN ORGANIZED HEALTH CARE EDUCATION/TRAINING PROGRAM

## 2024-09-18 PROCEDURE — 250N000011 HC RX IP 250 OP 636

## 2024-09-18 PROCEDURE — 82040 ASSAY OF SERUM ALBUMIN: CPT | Performed by: STUDENT IN AN ORGANIZED HEALTH CARE EDUCATION/TRAINING PROGRAM

## 2024-09-18 PROCEDURE — 96413 CHEMO IV INFUSION 1 HR: CPT

## 2024-09-18 PROCEDURE — 96375 TX/PRO/DX INJ NEW DRUG ADDON: CPT

## 2024-09-18 PROCEDURE — 96367 TX/PROPH/DG ADDL SEQ IV INF: CPT

## 2024-09-18 PROCEDURE — 250N000013 HC RX MED GY IP 250 OP 250 PS 637

## 2024-09-18 PROCEDURE — 96411 CHEMO IV PUSH ADDL DRUG: CPT

## 2024-09-18 PROCEDURE — 96415 CHEMO IV INFUSION ADDL HR: CPT

## 2024-09-18 PROCEDURE — 36415 COLL VENOUS BLD VENIPUNCTURE: CPT | Performed by: STUDENT IN AN ORGANIZED HEALTH CARE EDUCATION/TRAINING PROGRAM

## 2024-09-18 RX ORDER — ACETAMINOPHEN 325 MG/1
650 TABLET ORAL ONCE
Status: CANCELLED
Start: 2024-09-18

## 2024-09-18 RX ORDER — MEPERIDINE HYDROCHLORIDE 25 MG/ML
25 INJECTION INTRAMUSCULAR; INTRAVENOUS; SUBCUTANEOUS
Status: CANCELLED
Start: 2024-09-18

## 2024-09-18 RX ORDER — PALONOSETRON 0.05 MG/ML
0.25 INJECTION, SOLUTION INTRAVENOUS ONCE
Status: CANCELLED
Start: 2024-09-18

## 2024-09-18 RX ORDER — DOXORUBICIN HYDROCHLORIDE 2 MG/ML
50 INJECTION, SOLUTION INTRAVENOUS ONCE
Status: COMPLETED | OUTPATIENT
Start: 2024-09-18 | End: 2024-09-18

## 2024-09-18 RX ORDER — METHYLPREDNISOLONE SODIUM SUCCINATE 125 MG/2ML
125 INJECTION, POWDER, LYOPHILIZED, FOR SOLUTION INTRAMUSCULAR; INTRAVENOUS
Status: CANCELLED
Start: 2024-09-18

## 2024-09-18 RX ORDER — DIPHENHYDRAMINE HCL 25 MG
50 CAPSULE ORAL ONCE
Status: CANCELLED
Start: 2024-09-18

## 2024-09-18 RX ORDER — DOXORUBICIN HYDROCHLORIDE 2 MG/ML
50 INJECTION, SOLUTION INTRAVENOUS ONCE
Status: CANCELLED | OUTPATIENT
Start: 2024-09-18

## 2024-09-18 RX ORDER — ALBUTEROL SULFATE 0.83 MG/ML
2.5 SOLUTION RESPIRATORY (INHALATION)
Status: CANCELLED | OUTPATIENT
Start: 2024-09-18

## 2024-09-18 RX ORDER — MEPERIDINE HYDROCHLORIDE 25 MG/ML
25 INJECTION INTRAMUSCULAR; INTRAVENOUS; SUBCUTANEOUS EVERY 30 MIN PRN
Status: CANCELLED | OUTPATIENT
Start: 2024-09-18

## 2024-09-18 RX ORDER — DIPHENHYDRAMINE HCL 25 MG
50 CAPSULE ORAL ONCE
Status: COMPLETED | OUTPATIENT
Start: 2024-09-18 | End: 2024-09-18

## 2024-09-18 RX ORDER — ACETAMINOPHEN 325 MG/1
650 TABLET ORAL ONCE
Status: COMPLETED | OUTPATIENT
Start: 2024-09-18 | End: 2024-09-18

## 2024-09-18 RX ORDER — LORAZEPAM 2 MG/ML
0.5 INJECTION INTRAMUSCULAR EVERY 4 HOURS PRN
Status: CANCELLED | OUTPATIENT
Start: 2024-09-18

## 2024-09-18 RX ORDER — HEPARIN SODIUM,PORCINE 10 UNIT/ML
5-20 VIAL (ML) INTRAVENOUS DAILY PRN
Status: CANCELLED | OUTPATIENT
Start: 2024-09-18

## 2024-09-18 RX ORDER — PALONOSETRON 0.05 MG/ML
0.25 INJECTION, SOLUTION INTRAVENOUS ONCE
Status: COMPLETED | OUTPATIENT
Start: 2024-09-18 | End: 2024-09-18

## 2024-09-18 RX ORDER — HEPARIN SODIUM (PORCINE) LOCK FLUSH IV SOLN 100 UNIT/ML 100 UNIT/ML
5 SOLUTION INTRAVENOUS
Status: CANCELLED | OUTPATIENT
Start: 2024-09-18

## 2024-09-18 RX ORDER — ALBUTEROL SULFATE 90 UG/1
1-2 AEROSOL, METERED RESPIRATORY (INHALATION)
Status: CANCELLED
Start: 2024-09-18

## 2024-09-18 RX ORDER — EPINEPHRINE 1 MG/ML
0.3 INJECTION, SOLUTION INTRAMUSCULAR; SUBCUTANEOUS EVERY 5 MIN PRN
Status: CANCELLED | OUTPATIENT
Start: 2024-09-18

## 2024-09-18 RX ORDER — DIPHENHYDRAMINE HYDROCHLORIDE 50 MG/ML
50 INJECTION INTRAMUSCULAR; INTRAVENOUS
Status: CANCELLED
Start: 2024-09-18

## 2024-09-18 RX ADMIN — DIPHENHYDRAMINE HYDROCHLORIDE 50 MG: 25 CAPSULE ORAL at 11:56

## 2024-09-18 RX ADMIN — FOSAPREPITANT 150 MG: 150 INJECTION, POWDER, LYOPHILIZED, FOR SOLUTION INTRAVENOUS at 11:07

## 2024-09-18 RX ADMIN — VINCRISTINE SULFATE 2 MG: 1 INJECTION, SOLUTION INTRAVENOUS at 11:53

## 2024-09-18 RX ADMIN — PALONOSETRON HYDROCHLORIDE 0.25 MG: 0.25 INJECTION INTRAVENOUS at 11:10

## 2024-09-18 RX ADMIN — SODIUM CHLORIDE 250 ML: 9 INJECTION, SOLUTION INTRAVENOUS at 11:03

## 2024-09-18 RX ADMIN — CYCLOPHOSPHAMIDE 1500 MG: 1 INJECTION, POWDER, FOR SOLUTION INTRAVENOUS; ORAL at 12:03

## 2024-09-18 RX ADMIN — DOXORUBICIN HYDROCHLORIDE 100 MG: 2 INJECTION, SOLUTION INTRAVENOUS at 11:44

## 2024-09-18 RX ADMIN — ACETAMINOPHEN 650 MG: 325 TABLET ORAL at 11:56

## 2024-09-18 RX ADMIN — RITUXIMAB-ABBS 800 MG: 10 INJECTION, SOLUTION INTRAVENOUS at 12:54

## 2024-09-18 ASSESSMENT — PAIN SCALES - GENERAL: PAINLEVEL: NO PAIN (0)

## 2024-09-18 NOTE — PROGRESS NOTES
Infusion Nursing Note:  Pedrito Jenkins presents today for C2D1 Rapid  Rituxan/Adriamycin/Vincristine/Cytoxan.    Patient seen by provider today: Yes: Cherrie Govea NP   present during visit today: Not Applicable.    Note: No complaints.      Intravenous Access:  Peripheral IV placed.    Treatment Conditions:  Lab Results   Component Value Date    HGB 14.6 09/18/2024    WBC 3.8 (L) 09/18/2024    ANEUTAUTO 2.5 09/18/2024     09/18/2024        Lab Results   Component Value Date     09/18/2024    POTASSIUM 4.1 09/18/2024    CR 0.99 09/18/2024    NEY 9.8 09/18/2024    BILITOTAL 0.3 09/18/2024    ALBUMIN 4.4 09/18/2024    ALT 28 09/18/2024    AST 27 09/18/2024       Results reviewed, labs MET treatment parameters, ok to proceed with treatment.  ECHO/MUGA completed 8/6/24  EF 60-65%.      Post Infusion Assessment:  Patient tolerated infusion without incident.  Patient tolerated injection without incident.  Blood return noted pre and post infusion.  Blood return noted during Doxorubicin administration every 2-4 cc.  Site patent and intact, free from redness, edema or discomfort.  No evidence of extravasations.  Access discontinued per protocol.       Discharge Plan:   Patient declined prescription refills. Has enough supply of Prednisone.  Discharge instructions reviewed with: Patient.  Patient and/or family verbalized understanding of discharge instructions and all questions answered.  AVS to patient via [id number].  Patient will return 10/8 for next appointment.   Patient discharged in stable condition accompanied by: self.  Departure Mode: Ambulatory.      ADRIA LINDA RN

## 2024-09-18 NOTE — LETTER
9/18/2024      Pedrito Jenkins  [address]  [place] [facility] [place] [address]      Dear Colleague,    Thank you for referring your patient, Pedrito Jenkins, to the [facility]. Please see a copy of my visit note below.    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 He had been in his usual state of health until June, 2024 when he developed gradually progressive nausea and mild abdominal discomfort.  He also had some loose stools but not watery diarrhea.  The symptoms led to medical attention.  Initially an exhaustive workup for gastrointestinal pathology was done including IgA levels in the blood, enteric bacterial panel calprotectin and Helicobacter pylori in the stool, blood lipase levels, blood tissue transglutaminase levels, TSH, CRP, ESR as well as vitamin D and vitamin B12 levels.  Vitamin B12 and vitamin D levels were within normal limits.  Erythrocyte sedimentation rate , CRP,TSH, tissue transglutamine antibodies, and lipase were within WNL. Enteric bacterial panel was positive for enterotoxigenic E. Coli.  Calprotectin was negative, H. pylori stool antigen was negative as well.  He was given an antibiotic for 1 dose that did not resolve his symptoms- he did not remember the name of it antibiotic.    7/23/24 A CT scan was done as well to further investigate patient's nausea and abdominal pain.  That showed 5 cm x 4.7 cm x 5.3 cm enhancing soft tissue mass in the mesentery and there are scattered mesenteric lymphadenopathy surrounding this lesion and mesenteric stranding and haziness.  He subsequently saw Dr. Werner from general surgery and underwent laparoscopic biopsy of the mass on 8/6/24.  Patient also had umbilical hernia that was repaired.  Patient recovered from the  surgery without any major complications.  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  Subsequent PET scan done on 8/16/24 showed hypermetabolic mesenteric mass correlating with the prior CT scan with SUV max of 11.  There are scattered hypermetabolic lymphadenopathy in the mesentery surrounding the mass as well as mesenteric stranding. Patient does not have any major cytopenias on peripheral blood work as well as no renal or kidney abnormalities.      Nausea has resolved after surgery but patient still have very mild abdominal discomfort.  No high-grade fevers drenching night sweats.  Patient does feel fatigued.  Appetite is good.  ECOG performance status is 0-1.    Proceeded with treatment with R-CHOP (C1D1=8/28/24).     Treatment History:  8/28/24 C1D1 R-CHOP  9/18/24 C2D1 R-CHOP    Interval history:   Pedrito presents to clinic for toxicity check prior to C2 R-CHOP accompanied by his wife Tarun.  He is doing well and has been having good energy levels this past week.  He has been walking the past week every day.  His appetite is good and is eating and drinking well.  Since the prednisone wore off he is having easier time concentrating.  He has continued to have difficulty sleeping nightly- he is going to try THC gummies.  His bowels have normalized and is no longer needing Miralax or Senna.  He had some mild intermittent numbness in his toes today- unsure if this is due to how he was sitting.   Denies fevers/chills, night sweats, N/V, bleeding issues, rashes.sores, new pains, all other acute issues or concerns.    Current Outpatient Medications   Medication Sig Dispense Refill     acetaminophen (TYLENOL) 325 MG tablet Take 2 tablets (650 mg) by mouth every 4 hours as needed for mild pain       acyclovir (ZOVIRAX) 400 MG tablet Take 1 tablet (400 mg) by mouth every 12 hours. 60 tablet 11     allopurinol (ZYLOPRIM) 300 MG tablet Take 1 tablet (300 mg) by mouth daily. 30 tablet 3     ondansetron  (ZOFRAN) 8 MG tablet Take 1 tablet (8 mg) by mouth every 8 hours as needed for nausea (vomiting). Do not start before August 25, 2024. 30 tablet 2     predniSONE (DELTASONE) 10 MG tablet TAKE 5 TABLETS (50 MG) BY MOUTH 2 TIMES DAILY FOR 5 DAYS. TAKE ON DAYS 1 THROUGH 5. TAKE FIRST DOSE IN AM PRIOR TO CHEMOTHERAPY. DO NOT START BEFORE AUGUST 25, 2024.*       prochlorperazine (COMPAZINE) 10 MG tablet Take 1 tablet (10 mg) by mouth every 6 hours as needed for nausea or vomiting. Do not start before August 25, 2024. 30 tablet 2     SENNA-docusate sodium (SENNA S) 8.6-50 MG tablet Take 1 tablet by mouth at bedtime. 30 tablet 1       No Known Allergies    Physical Video Exam:  /78   Pulse 54   Temp 97.6  F (36.4  C) (Oral)   Resp 16   Wt 88.9 kg (195 lb 14.4 oz)   SpO2 98%   BMI 28.93 kg/m    General: No acute distress, pleasant, well-groomed   HEENT: Sclera anicteric. Oral exam deferred  Lymph: No lymphadenopathy in neck  or supraclavicular areas  Heart: Regular, rate, and rhythm  Lungs: Clear to ascultation bilaterally  Extremities: no lower extremity edema  Neuro: Cranial nerves grossly intact  Skin: No rashes, sores, lesions, or unusual bruising on exposed skin   Vascular access: PIV      Labs:   Most Recent 3 CBC's:  Recent Labs   Lab Test 09/18/24  1000 08/28/24  0657 06/21/24  0853   WBC 3.8* 6.4 5.9   HGB 14.6 14.8 14.6   MCV 86 86 88    231 223   ANEUTAUTO 2.5 4.2 3.9     Most Recent 3 BMP's:  Recent Labs   Lab Test 08/28/24  0657 08/16/24  0711 08/15/24  1105 06/21/24  0853     --  139 139   POTASSIUM 4.0  --  4.4 4.4   CHLORIDE 105  --  103 105   CO2 22  --  25 25   BUN 17.9  --  16.4 14.9   CR 0.95  --  0.99 1.04   ANIONGAP 13  --  11 9   NEY 9.6  --  9.6 9.7   GLC 98 107* 95 97   PROTTOTAL 6.8  --  7.0 6.6   ALBUMIN 4.5  --  4.6 4.5    Most Recent 3 LFT's:  Recent Labs   Lab Test 08/28/24  0657 08/15/24  1105 06/21/24  0853   AST 30 28 24   ALT 28 25 17   ALKPHOS 108 103 93    BILITOTAL 0.4 0.5 0.5    Most Recent 2 TSH and T4:  Recent Labs   Lab Test 06/21/24  0853   TSH 2.17     I reviewed the above labs today.      Impression/plan:     Nodular lymphocyte predominant Hodgkin lymphoma:  - Previously discussed in detail natural history, prognosis, management of nodular lymphocyte predominant Hodgkin lymphoma. Discussed that NLPHL can have indolent or aggressive presentation and relapses have been reported after long term follow up. Subdiaphragmatic presentations are commonly associated with transformation to diffuse large B cell lymphoma. Rituximab monotherapy has been associated with more than 50% likelihood of relapse over short follow up of 3-5 years. Considering that patient is symptomatic from his disease, at high risk of transformation, we elected to treat with RCHOP.   RCHOP,  in a phase 2 trial of NLPHL, has shown high CR of 89% rates, 88.5% 5-year PFS and  reduced risk of transformation.  He had 5 cm mesenteric mass with scattered other mesenteric lymph nodes, overall radiation leads to high toxicities in this area.  We will explore the role of radiation after he finishes chemotherapy based on how he responds.  -Plan for 4-6 cycles of R-CHOP.  - R-CHOP (C1D1=8/28/24)  - S/p C1 and tolerating treatment well.  His biggest complaint is difficulty sleeping and will try THC gummies  - Proceed with C2D1 R-CHOP today (9/18)   - PET scan after 2 cycles- scheduled 9/27 which is too early and requested it to be moved to 10/4 or 10/5    Ppx:  - Acyclovir 400 mg BID    Constipation- resolved:  D/t chemo.  Managed with Miralax and Senna    Difficulty Sleeping:  D/t anxiety and steroids?  Difficulty falling and staying asleep.  - Will trial THC gummies    18 minutes spent on the date of the encounter doing chart review, review of test results, interpretation of tests, patient visit, and documentation     PARAS Hamilton [facility]  [address] [place]  [address]  [phone]      Again, thank you for allowing me to participate in the care of your patient.        Sincerely,        PARAS Hamilton CNP

## 2024-09-18 NOTE — NURSING NOTE
Chief Complaint   Patient presents with    Blood Draw     Vitals, blood draw, piv placed by KG, VAT. Pt checked into appt.     Becki Stephens MA

## 2024-09-18 NOTE — NURSING NOTE
"Oncology Rooming Note    September 18, 2024 10:09 AM   Pedrito Jenkins is a 53 year old male who presents for:    Chief Complaint   Patient presents with    Blood Draw     Vitals, blood draw, piv placed by SUSIE FRANKEL. Pt checked into appt.    Oncology Clinic Visit     Hodgkin's Lymphoma  Mediastinal Mass     Initial Vitals: /78   Pulse 54   Temp 97.6  F (36.4  C) (Oral)   Resp 16   Wt 88.9 kg (195 lb 14.4 oz)   SpO2 98%   BMI 28.93 kg/m   Estimated body mass index is 28.93 kg/m  as calculated from the following:    Height as of 9/4/24: 1.753 m (5' 9\").    Weight as of this encounter: 88.9 kg (195 lb 14.4 oz). Body surface area is 2.08 meters squared.  No Pain (0) Comment: Data Unavailable   No LMP for male patient.  Allergies reviewed: Yes  Medications reviewed: Yes    Medications: Medication refills not needed today.  Pharmacy name entered into [facility]: [id number] - [place], [place] - [address]    Frailty Screening:   Is the patient here for a new oncology consult visit in cancer care? 2. No      Clinical concerns: None       Qi Rae LPN  9/18/2024              "

## 2024-09-23 ENCOUNTER — NURSE TRIAGE (OUTPATIENT)
Dept: NURSING | Facility: CLINIC | Age: 54
End: 2024-09-23
Payer: COMMERCIAL

## 2024-09-23 NOTE — TELEPHONE ENCOUNTER
Nurse Triage SBAR    Is this a 2nd Level Triage? NO    Situation: constipation    Background: Patient had chemo on 9/18/24, last BM 9/20/24. Patient states that he is uncomfortable.  Patient is using his ordered Senna and this morning used miralax, which has not yet been effective    Assessment: constipation day 3 no BM    Protocol Recommended Disposition:   Home Care, See PCP Within 3 Days    Recommendation: Patient is advised that Miralax can take up to 3 days to be effective, and to keep drinking plenty of liquids     Jessica Burns RN on 9/23/2024 at 7:09 AM      Does the patient meet one of the following criteria for ADS visit consideration? 16+ years old, with an [facility] PCP     TIP  Providers, please consider if this condition is appropriate for management at one of our Acute and Diagnostic Services sites.     If patient is a good candidate, please use dotphrase <dot>triageresponse and select Refer to ADS to document.    Reason for Disposition   Unable to have a bowel movement (BM) without laxative or enema  (Exception: This is the doctor's current treatment plan for constipation.)   MILD-MODERATE constipation    Additional Information   Negative: [1] Neutropenia known or suspected (e.g., recent cancer chemotherapy) AND [2] fever > 100.4 F (38.0 C)   Negative: [1] Vomiting AND [2] contains bile (green color)   Negative: Patient sounds very sick or weak to the triager   Negative: [1] Vomiting AND [2] abdomen looks much more swollen than usual   Negative: [1] Constant abdominal pain AND [2] present > 2 hours   Negative: [1] Sudden onset rectal pain (straining, rectal pressure or fullness) AND [2] NOT better after SITZ bath, suppository or enema   Negative: [1] Abdominal pain (i.e., mild or intermittent) AND [2] fever   Negative: [1] Abdominal pain (i.e., mild or intermittent) AND [2] abdomen looks much more swollen than usual   Negative: Last bowel movement (BM) > 4 days ago   Negative: Leaking stool   Negative:  [1] Constipation present > 1 week AND [2] no improvement after using Care Advice    Protocols used: Cancer - Constipation-A-AH

## 2024-09-30 ENCOUNTER — TELEPHONE (OUTPATIENT)
Dept: ONCOLOGY | Facility: CLINIC | Age: 54
End: 2024-09-30
Payer: COMMERCIAL

## 2024-09-30 NOTE — TELEPHONE ENCOUNTER
[employer] forms received via EMAIL from [email or web address].      Forms to be completed and put in folder for provider to approve.    Fax #:  [facility] [phone]    Blessing Cotton

## 2024-10-04 ENCOUNTER — ANCILLARY PROCEDURE (OUTPATIENT)
Dept: PET IMAGING | Facility: CLINIC | Age: 54
End: 2024-10-04
Attending: STUDENT IN AN ORGANIZED HEALTH CARE EDUCATION/TRAINING PROGRAM
Payer: COMMERCIAL

## 2024-10-04 DIAGNOSIS — C81.03 NODULAR LYMPHOCYTE PREDOMINANT HODGKIN LYMPHOMA OF INTRA-ABDOMINAL LYMPH NODES (H): ICD-10-CM

## 2024-10-04 PROCEDURE — 78816 PET IMAGE W/CT FULL BODY: CPT | Mod: GC | Performed by: RADIOLOGY

## 2024-10-04 PROCEDURE — A9552 F18 FDG: HCPCS | Performed by: RADIOLOGY

## 2024-10-04 RX ORDER — FLUDEOXYGLUCOSE F 18 200 MCI/ML
10-18 INJECTION, SOLUTION INTRAVENOUS ONCE
Status: COMPLETED | OUTPATIENT
Start: 2024-10-04 | End: 2024-10-04

## 2024-10-04 RX ADMIN — FLUDEOXYGLUCOSE F 18 13.92 MILLICURIE: 200 INJECTION, SOLUTION INTRAVENOUS at 13:55

## 2024-10-04 NOTE — TELEPHONE ENCOUNTER
[facility] paperwork completed, checked for accuracy, signed and faxed to [facility] Management @ [phone]. A copy was made, sent to scanning and original mailed to patient at home address.    Successful transmission verified in Right Fax.      Celi Ayala

## 2024-10-04 NOTE — TELEPHONE ENCOUNTER
[employer] paperwork completed, checked for accuracy, signed and faxed to [facility] @ [phone]. A copy was made, sent to scanning and original mailed to patient at home address.    Successful transmission verified in Right Fax.      Celi Ayala

## 2024-10-07 ENCOUNTER — ONCOLOGY VISIT (OUTPATIENT)
Dept: ONCOLOGY | Facility: CLINIC | Age: 54
End: 2024-10-07
Attending: STUDENT IN AN ORGANIZED HEALTH CARE EDUCATION/TRAINING PROGRAM
Payer: COMMERCIAL

## 2024-10-07 VITALS
DIASTOLIC BLOOD PRESSURE: 71 MMHG | OXYGEN SATURATION: 100 % | RESPIRATION RATE: 16 BRPM | WEIGHT: 198 LBS | BODY MASS INDEX: 29.24 KG/M2 | HEART RATE: 59 BPM | SYSTOLIC BLOOD PRESSURE: 126 MMHG | TEMPERATURE: 98.2 F

## 2024-10-07 DIAGNOSIS — C81.03 NODULAR LYMPHOCYTE PREDOMINANT HODGKIN LYMPHOMA OF INTRA-ABDOMINAL LYMPH NODES (H): Primary | ICD-10-CM

## 2024-10-07 PROCEDURE — 99214 OFFICE O/P EST MOD 30 MIN: CPT | Performed by: STUDENT IN AN ORGANIZED HEALTH CARE EDUCATION/TRAINING PROGRAM

## 2024-10-07 PROCEDURE — 99213 OFFICE O/P EST LOW 20 MIN: CPT | Performed by: STUDENT IN AN ORGANIZED HEALTH CARE EDUCATION/TRAINING PROGRAM

## 2024-10-07 ASSESSMENT — PAIN SCALES - GENERAL: PAINLEVEL: NO PAIN (0)

## 2024-10-07 NOTE — LETTER
10/7/2024      Pedrito Jenkins  [address]  [place] [facility] [address]      Dear Colleague,    Thank you for referring your patient, Pedrito Jenkins, to the [facility]. Please see a copy of my visit note below.      Subjective  Pedrito is a 53 year old, presenting for the following health issues:  Oncology Clinic Visit (Nodular lymphocyte predominant Hodgkin lymphoma of intra-abdominal lymph nodes  )    HPI     Oncology History Overview Note   This is a 53-year-old man coming in for initial consultation of newly diagnosed nodular lymphocyte predominant Hodgkin lymphoma.    Patient had been in his usual state of health until June 2024 when he developed gradually progressive nausea and mild abdominal discomfort.  He also had some loose stools but not watery diarrhea.  The symptoms led to medical attention.  Initially an exhaustive workup for gastrointestinal pathology was done including IgA levels in the blood, enteric bacterial panel calprotectin and Helicobacter pylori in the stool, blood lipase levels, blood tissue transglutaminase levels, TSH, CRP, ESR as well as vitamin D and vitamin B12 levels.  Vitamin B12 and vitamin D levels were within normal limits.  Erythrocyte sedimentation rate was also within normal limits CRP was also within normal limits TSH was normal as well tissue transglutaminase antibodies were negative lipase levels were within normal limits, enteric bacterial panel was positive for enterotoxigenic E. coli, calprotectin was negative, H. pylori stool antigen was negative as well.  He was given an antibiotic for 1 dose and that did not resolve his symptoms.  He does not remember the name of it antibiotic.    A CT scan was done as well to further investigate patient's nausea and abdominal pain.  That showed 5 cm x 4.7 cm x 5.3 cm enhancing soft tissue mass in the mesentery and there are scattered mesenteric lymphadenopathy surrounding this lesion and mesenteric stranding and  haziness.  He subsequently saw Dr. Werner from general surgery and underwent laparoscopic biopsy of the mass on 6 August 2024.  Patient also had umbilical hernia that was repaired.  Patient recovered from the surgery without any major complications.  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  Subsequent PET scan done on 16th of August showed hypermetabolic mesenteric mass correlating with the prior CT scan with SUV max of 11.  There are scattered hypermetabolic lymphadenopathy in the mesentery surrounding the mass as well as mesenteric stranding. Patient does not have any major cytopenias on peripheral blood work as well as no renal or kidney abnormalities.      Nausea has resolved after surgery but patient still have very mild abdominal discomfort.  No high-grade fevers drenching night sweats.  Patient does feel fatigued.  Appetite is good.  ECOG performance status is 0-1.    He received 2 cycles of RCHOP. He tolerated it well. PET scan in 10/2024 shows no evidence of disease, metabolic CR.       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 No fevers, chills, night sweats or weight loss, no lymphadenopathy. He still has mild abdominal discomfort and occasional diarrhea.         Objective   /71 (BP Location: Right arm, Patient Position: Sitting, Cuff Size: Adult Large)   Pulse 59   Temp 98.2  F (36.8  C) (Oral)   Resp 16   Wt 89.8 kg (198 lb)   SpO2 100%   BMI 29.24 kg/m    Body mass index is 29.24 kg/m .  Physical Exam   GENERAL:  Alert oriented well nourished  HEAD: normocephalic atraumatic  SKIN:  no rash, hives, other  lesions.  LYMPHATIC: no abnormal lymph nodes palable in cervical, axillary, supraclavicular or inguinal area.  RESP:  No rales or rhonchi, breath sounds bilaterally equal and vesicular  CV:  No tachycardia, S1 S2 normal No murmur.  GI:  Abdomen soft nontender no hepato or splenomegaly  MUSCULOSKELETAL:  No visible joint redness or swelling.  NEURO:  No gross weakness gait normal  PSYCH: pleasant affect    Labs: I personally reviewed complete blood count, differential, renal function test, liver function tests LDH.  No cytopenias, LFTs within normal limits, renal function test within normal limits and LDH is normal as well.    Imaging: I personally reviewed PETCT neck/chest/abdomen/pelvis and discussed with the patient.  Complete response to treatment so far.    Assessment and Plan:    1) Nodular lymphocyte predominant Hodgkin lymphoma:  - He is in CR without any radiographic evidence of lymphoma.  - We will continue with RCHOP for total 4 cycles.   - PET scan after cycle 4 and visit with me    2) Steroid induced insomnia and irritability:  - I will reduce prednisone to 50 mg daily. I asked the patient and his wife to take 1 tablet of prednisone daily instead of 2. Patient and his wife verbalized understanding.    Total time spent on date of service in review of medical records, review of labs, history taking, physical exam, discussion of assessment and plan, counseling and patient education is 30 minutes.    Lalito Lu MD  Attending Physician  Pager [phone]                Signed Electronically by: Lalito Lu MD      Again, thank you for allowing me to participate in the care of your patient.        Sincerely,        Lalito Lu MD

## 2024-10-07 NOTE — NURSING NOTE
"Oncology Rooming Note    October 7, 2024 3:54 PM   Pedrito Jenkins is a 53 year old male who presents for:    Chief Complaint   Patient presents with    Oncology Clinic Visit     Nodular lymphocyte predominant Hodgkin lymphoma of intra-abdominal lymph nodes       Initial Vitals: /71 (BP Location: Right arm, Patient Position: Sitting, Cuff Size: Adult Large)   Pulse 59   Temp 98.2  F (36.8  C) (Oral)   Resp 16   Wt 89.8 kg (198 lb)   SpO2 100%   BMI 29.24 kg/m   Estimated body mass index is 29.24 kg/m  as calculated from the following:    Height as of 9/4/24: 1.753 m (5' 9\").    Weight as of this encounter: 89.8 kg (198 lb). Body surface area is 2.09 meters squared.  No Pain (0) Comment: Data Unavailable   No LMP for male patient.  Allergies reviewed: Yes  Medications reviewed: Yes    Medications: MEDICATION REFILLS NEEDED TODAY. Provider was notified.  Pharmacy name entered into [facility]: [id number] - [place], [place] - [address]    Frailty Screening:   Is the patient here for a new oncology consult visit in cancer care? 2. No      Clinical concerns: Needs refills on Compazine 10 mg   Dr. Lu  was notified.      Yvette Wheatley              "

## 2024-10-07 NOTE — PROGRESS NOTES
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    Patient had been in his usual state of health until June 2024 when he developed gradually progressive nausea and mild abdominal discomfort.  He also had some loose stools but not watery diarrhea.  The symptoms led to medical attention.  Initially an exhaustive workup for gastrointestinal pathology was done including IgA levels in the blood, enteric bacterial panel calprotectin and Helicobacter pylori in the stool, blood lipase levels, blood tissue transglutaminase levels, TSH, CRP, ESR as well as vitamin D and vitamin B12 levels.  Vitamin B12 and vitamin D levels were within normal limits.  Erythrocyte sedimentation rate was also within normal limits CRP was also within normal limits TSH was normal as well tissue transglutaminase antibodies were negative lipase levels were within normal limits, enteric bacterial panel was positive for enterotoxigenic E. coli, calprotectin was negative, H. pylori stool antigen was negative as well.  He was given an antibiotic for 1 dose and that did not resolve his symptoms.  He does not remember the name of it antibiotic.    A CT scan was done as well to further investigate patient's nausea and abdominal pain.  That showed 5 cm x 4.7 cm x 5.3 cm enhancing soft tissue mass in the mesentery and there are scattered mesenteric lymphadenopathy surrounding this lesion and mesenteric stranding and haziness.  He subsequently saw Dr. Werner from general surgery and underwent laparoscopic biopsy of the mass on 6 August 2024.  Patient also had umbilical hernia that was repaired.  Patient recovered from the surgery without any major  complications.  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  Subsequent PET scan done on 16th of August showed hypermetabolic mesenteric mass correlating with the prior CT scan with SUV max of 11.  There are scattered hypermetabolic lymphadenopathy in the mesentery surrounding the mass as well as mesenteric stranding. Patient does not have any major cytopenias on peripheral blood work as well as no renal or kidney abnormalities.      Nausea has resolved after surgery but patient still have very mild abdominal discomfort.  No high-grade fevers drenching night sweats.  Patient does feel fatigued.  Appetite is good.  ECOG performance status is 0-1.    He received 2 cycles of RCHOP. He tolerated it well. PET scan in 10/2024 shows no evidence of disease, metabolic CR.       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 No fevers, chills, night sweats or weight loss, no lymphadenopathy. He still has mild abdominal discomfort and occasional diarrhea.         Objective    /71 (BP Location: Right arm, Patient Position: Sitting, Cuff Size: Adult Large)   Pulse 59   Temp 98.2  F (36.8  C) (Oral)   Resp 16   Wt 89.8 kg (198 lb)   SpO2 100%   BMI 29.24 kg/m    Body mass index is 29.24 kg/m .  Physical Exam   GENERAL:  Alert oriented well nourished  HEAD: normocephalic atraumatic  SKIN:  no rash, hives, other lesions.  LYMPHATIC: no abnormal lymph nodes palable in cervical, axillary, supraclavicular or inguinal area.  RESP:  No rales or rhonchi, breath sounds bilaterally equal and vesicular  CV:  No tachycardia, S1 S2 normal No murmur.  GI:   Abdomen soft nontender no hepato or splenomegaly  MUSCULOSKELETAL:  No visible joint redness or swelling.  NEURO:  No gross weakness gait normal  PSYCH: pleasant affect    Labs: I personally reviewed complete blood count, differential, renal function test, liver function tests LDH.  No cytopenias, LFTs within normal limits, renal function test within normal limits and LDH is normal as well.    Imaging: I personally reviewed PETCT neck/chest/abdomen/pelvis and discussed with the patient.  Complete response to treatment so far.    Assessment and Plan:    1) Nodular lymphocyte predominant Hodgkin lymphoma:  - He is in CR without any radiographic evidence of lymphoma.  - We will continue with RCHOP for total 4 cycles.   - PET scan after cycle 4 and visit with me    2) Steroid induced insomnia and irritability:  - I will reduce prednisone to 50 mg daily. I asked the patient and his wife to take 1 tablet of prednisone daily instead of 2. Patient and his wife verbalized understanding.    Total time spent on date of service in review of medical records, review of labs, history taking, physical exam, discussion of assessment and plan, counseling and patient education is 30 minutes.    Lalito Lu MD  Attending Physician  Pager [phone]                Signed Electronically by: Lalito Lu MD

## 2024-10-08 ENCOUNTER — INFUSION THERAPY VISIT (OUTPATIENT)
Dept: ONCOLOGY | Facility: CLINIC | Age: 54
End: 2024-10-08
Attending: STUDENT IN AN ORGANIZED HEALTH CARE EDUCATION/TRAINING PROGRAM
Payer: COMMERCIAL

## 2024-10-08 ENCOUNTER — APPOINTMENT (OUTPATIENT)
Dept: LAB | Facility: CLINIC | Age: 54
End: 2024-10-08
Attending: STUDENT IN AN ORGANIZED HEALTH CARE EDUCATION/TRAINING PROGRAM
Payer: COMMERCIAL

## 2024-10-08 VITALS
OXYGEN SATURATION: 98 % | RESPIRATION RATE: 16 BRPM | BODY MASS INDEX: 28.96 KG/M2 | TEMPERATURE: 98.2 F | SYSTOLIC BLOOD PRESSURE: 122 MMHG | DIASTOLIC BLOOD PRESSURE: 80 MMHG | WEIGHT: 196.1 LBS | HEART RATE: 60 BPM

## 2024-10-08 DIAGNOSIS — C81.03 NODULAR LYMPHOCYTE PREDOMINANT HODGKIN LYMPHOMA OF INTRA-ABDOMINAL LYMPH NODES (H): Primary | ICD-10-CM

## 2024-10-08 LAB
ALBUMIN SERPL BCG-MCNC: 4.4 G/DL (ref 3.5–5.2)
ALP SERPL-CCNC: 86 U/L (ref 40–150)
ALT SERPL W P-5'-P-CCNC: 37 U/L (ref 0–70)
ANION GAP SERPL CALCULATED.3IONS-SCNC: 12 MMOL/L (ref 7–15)
AST SERPL W P-5'-P-CCNC: 34 U/L (ref 0–45)
BASOPHILS # BLD AUTO: 0.1 10E3/UL (ref 0–0.2)
BASOPHILS NFR BLD AUTO: 2 %
BILIRUB SERPL-MCNC: 0.4 MG/DL
BUN SERPL-MCNC: 12.8 MG/DL (ref 6–20)
CALCIUM SERPL-MCNC: 9.9 MG/DL (ref 8.8–10.4)
CHLORIDE SERPL-SCNC: 104 MMOL/L (ref 98–107)
CREAT SERPL-MCNC: 0.98 MG/DL (ref 0.67–1.17)
EGFRCR SERPLBLD CKD-EPI 2021: >90 ML/MIN/1.73M2
EOSINOPHIL # BLD AUTO: 0.1 10E3/UL (ref 0–0.7)
EOSINOPHIL NFR BLD AUTO: 3 %
ERYTHROCYTE [DISTWIDTH] IN BLOOD BY AUTOMATED COUNT: 13.1 % (ref 10–15)
GLUCOSE SERPL-MCNC: 115 MG/DL (ref 70–99)
HCO3 SERPL-SCNC: 23 MMOL/L (ref 22–29)
HCT VFR BLD AUTO: 38.7 % (ref 40–53)
HGB BLD-MCNC: 14.1 G/DL (ref 13.3–17.7)
IMM GRANULOCYTES # BLD: 0 10E3/UL
IMM GRANULOCYTES NFR BLD: 1 %
LYMPHOCYTES # BLD AUTO: 0.6 10E3/UL (ref 0.8–5.3)
LYMPHOCYTES NFR BLD AUTO: 21 %
MCH RBC QN AUTO: 31.5 PG (ref 26.5–33)
MCHC RBC AUTO-ENTMCNC: 36.4 G/DL (ref 31.5–36.5)
MCV RBC AUTO: 86 FL (ref 78–100)
MONOCYTES # BLD AUTO: 0.6 10E3/UL (ref 0–1.3)
MONOCYTES NFR BLD AUTO: 20 %
NEUTROPHILS # BLD AUTO: 1.6 10E3/UL (ref 1.6–8.3)
NEUTROPHILS NFR BLD AUTO: 52 %
NRBC # BLD AUTO: 0 10E3/UL
NRBC BLD AUTO-RTO: 0 /100
PLATELET # BLD AUTO: 210 10E3/UL (ref 150–450)
POTASSIUM SERPL-SCNC: 4.1 MMOL/L (ref 3.4–5.3)
PROT SERPL-MCNC: 6.6 G/DL (ref 6.4–8.3)
RBC # BLD AUTO: 4.48 10E6/UL (ref 4.4–5.9)
SODIUM SERPL-SCNC: 139 MMOL/L (ref 135–145)
WBC # BLD AUTO: 3 10E3/UL (ref 4–11)

## 2024-10-08 PROCEDURE — 96367 TX/PROPH/DG ADDL SEQ IV INF: CPT

## 2024-10-08 PROCEDURE — 96375 TX/PRO/DX INJ NEW DRUG ADDON: CPT

## 2024-10-08 PROCEDURE — 80053 COMPREHEN METABOLIC PANEL: CPT | Performed by: STUDENT IN AN ORGANIZED HEALTH CARE EDUCATION/TRAINING PROGRAM

## 2024-10-08 PROCEDURE — 96411 CHEMO IV PUSH ADDL DRUG: CPT

## 2024-10-08 PROCEDURE — 96413 CHEMO IV INFUSION 1 HR: CPT

## 2024-10-08 PROCEDURE — 96417 CHEMO IV INFUS EACH ADDL SEQ: CPT

## 2024-10-08 PROCEDURE — 85025 COMPLETE CBC W/AUTO DIFF WBC: CPT | Performed by: STUDENT IN AN ORGANIZED HEALTH CARE EDUCATION/TRAINING PROGRAM

## 2024-10-08 PROCEDURE — 250N000013 HC RX MED GY IP 250 OP 250 PS 637: Performed by: STUDENT IN AN ORGANIZED HEALTH CARE EDUCATION/TRAINING PROGRAM

## 2024-10-08 PROCEDURE — 96415 CHEMO IV INFUSION ADDL HR: CPT

## 2024-10-08 PROCEDURE — 36415 COLL VENOUS BLD VENIPUNCTURE: CPT | Performed by: STUDENT IN AN ORGANIZED HEALTH CARE EDUCATION/TRAINING PROGRAM

## 2024-10-08 PROCEDURE — 250N000011 HC RX IP 250 OP 636: Performed by: STUDENT IN AN ORGANIZED HEALTH CARE EDUCATION/TRAINING PROGRAM

## 2024-10-08 PROCEDURE — 258N000003 HC RX IP 258 OP 636: Performed by: STUDENT IN AN ORGANIZED HEALTH CARE EDUCATION/TRAINING PROGRAM

## 2024-10-08 RX ORDER — METHYLPREDNISOLONE SODIUM SUCCINATE 125 MG/2ML
125 INJECTION INTRAMUSCULAR; INTRAVENOUS
Status: CANCELLED
Start: 2024-10-08

## 2024-10-08 RX ORDER — HEPARIN SODIUM,PORCINE 10 UNIT/ML
5-20 VIAL (ML) INTRAVENOUS DAILY PRN
Status: CANCELLED | OUTPATIENT
Start: 2024-10-08

## 2024-10-08 RX ORDER — DIPHENHYDRAMINE HCL 25 MG
50 CAPSULE ORAL ONCE
Status: CANCELLED
Start: 2024-10-08

## 2024-10-08 RX ORDER — ACETAMINOPHEN 325 MG/1
650 TABLET ORAL ONCE
Status: COMPLETED | OUTPATIENT
Start: 2024-10-08 | End: 2024-10-08

## 2024-10-08 RX ORDER — ALBUTEROL SULFATE 0.83 MG/ML
2.5 SOLUTION RESPIRATORY (INHALATION)
Status: CANCELLED | OUTPATIENT
Start: 2024-10-08

## 2024-10-08 RX ORDER — LORAZEPAM 2 MG/ML
0.5 INJECTION INTRAMUSCULAR EVERY 4 HOURS PRN
Status: CANCELLED | OUTPATIENT
Start: 2024-10-08

## 2024-10-08 RX ORDER — DIPHENHYDRAMINE HYDROCHLORIDE 50 MG/ML
50 INJECTION INTRAMUSCULAR; INTRAVENOUS
Status: CANCELLED
Start: 2024-10-08

## 2024-10-08 RX ORDER — PALONOSETRON 0.05 MG/ML
0.25 INJECTION, SOLUTION INTRAVENOUS ONCE
Status: COMPLETED | OUTPATIENT
Start: 2024-10-08 | End: 2024-10-08

## 2024-10-08 RX ORDER — ALBUTEROL SULFATE 90 UG/1
1-2 INHALANT RESPIRATORY (INHALATION)
Status: CANCELLED
Start: 2024-10-08

## 2024-10-08 RX ORDER — PALONOSETRON 0.05 MG/ML
0.25 INJECTION, SOLUTION INTRAVENOUS ONCE
Status: CANCELLED
Start: 2024-10-08

## 2024-10-08 RX ORDER — DIPHENHYDRAMINE HCL 25 MG
50 CAPSULE ORAL ONCE
Status: COMPLETED | OUTPATIENT
Start: 2024-10-08 | End: 2024-10-08

## 2024-10-08 RX ORDER — DOXORUBICIN HYDROCHLORIDE 2 MG/ML
50 INJECTION, SOLUTION INTRAVENOUS ONCE
Status: CANCELLED | OUTPATIENT
Start: 2024-10-08

## 2024-10-08 RX ORDER — EPINEPHRINE 1 MG/ML
0.3 INJECTION, SOLUTION INTRAMUSCULAR; SUBCUTANEOUS EVERY 5 MIN PRN
Status: CANCELLED | OUTPATIENT
Start: 2024-10-08

## 2024-10-08 RX ORDER — MEPERIDINE HYDROCHLORIDE 25 MG/ML
25 INJECTION INTRAMUSCULAR; INTRAVENOUS; SUBCUTANEOUS EVERY 30 MIN PRN
Status: CANCELLED | OUTPATIENT
Start: 2024-10-08

## 2024-10-08 RX ORDER — DOXORUBICIN HYDROCHLORIDE 2 MG/ML
50 INJECTION, SOLUTION INTRAVENOUS ONCE
Status: COMPLETED | OUTPATIENT
Start: 2024-10-08 | End: 2024-10-08

## 2024-10-08 RX ORDER — ACETAMINOPHEN 325 MG/1
650 TABLET ORAL ONCE
Status: CANCELLED
Start: 2024-10-08

## 2024-10-08 RX ORDER — HEPARIN SODIUM (PORCINE) LOCK FLUSH IV SOLN 100 UNIT/ML 100 UNIT/ML
5 SOLUTION INTRAVENOUS
Status: CANCELLED | OUTPATIENT
Start: 2024-10-08

## 2024-10-08 RX ORDER — MEPERIDINE HYDROCHLORIDE 25 MG/ML
25 INJECTION INTRAMUSCULAR; INTRAVENOUS; SUBCUTANEOUS
Status: CANCELLED
Start: 2024-10-08

## 2024-10-08 RX ADMIN — SODIUM CHLORIDE 250 ML: 9 INJECTION, SOLUTION INTRAVENOUS at 08:47

## 2024-10-08 RX ADMIN — PALONOSETRON HYDROCHLORIDE 0.25 MG: 0.25 INJECTION INTRAVENOUS at 07:44

## 2024-10-08 RX ADMIN — FOSAPREPITANT 150 MG: 150 INJECTION, POWDER, LYOPHILIZED, FOR SOLUTION INTRAVENOUS at 07:44

## 2024-10-08 RX ADMIN — RITUXIMAB-ABBS 800 MG: 10 INJECTION, SOLUTION INTRAVENOUS at 10:00

## 2024-10-08 RX ADMIN — DIPHENHYDRAMINE HYDROCHLORIDE 50 MG: 25 CAPSULE ORAL at 09:14

## 2024-10-08 RX ADMIN — ACETAMINOPHEN 650 MG: 325 TABLET ORAL at 09:14

## 2024-10-08 RX ADMIN — DOXORUBICIN HYDROCHLORIDE 100 MG: 2 INJECTION, SOLUTION INTRAVENOUS at 08:47

## 2024-10-08 RX ADMIN — CYCLOPHOSPHAMIDE 1500 MG: 1 INJECTION, POWDER, FOR SOLUTION INTRAVENOUS; ORAL at 09:09

## 2024-10-08 RX ADMIN — VINCRISTINE SULFATE 2 MG: 1 INJECTION, SOLUTION INTRAVENOUS at 09:01

## 2024-10-08 ASSESSMENT — PAIN SCALES - GENERAL: PAINLEVEL: NO PAIN (0)

## 2024-10-08 NOTE — PATIENT INSTRUCTIONS
You received an antinausea medication called Aloxi prior to your treatment today which lasts in your body for 72 hours. Aloxi is in the same family as one of your take home medications, Ondanestron (Zofran). Because they are in the same family if you feel nauseated over the next 3 days take Compazine (prochlorperazine). Taking Zofran will not help with nausea in the first three days after treatment because the Aloxi in your system is already working on that pathway.      Be safe with body fluids  The chemicals in the medicine leave your body through vomit, saliva, urine, or stool. The chemicals can stay in your body fluids for several days after your last treatment. So don't let anyone touch any waste from your body. Have caregivers use gloves when washing bed linens. And wash your linens separately from other items, including other linens and clothes.  From start of treatment until the first 48 hours after treatment cycle is completed:  Use a different toilet from the rest of the household, if available.  Sit on the toilet seat to prevent splashing.  Put the toilet lid down before you flush.  Always flush twice after using the toilet.  Also, couples should use a condom during sex while a partner is getting chemo treatments and for several days after treatment ends.   [facility] Triage and after hours / weekends / holidays:  [phone]    Please call the triage or after hours line if you experience a temperature greater than or equal to 100.4, shaking chills, have uncontrolled nausea, vomiting and/or diarrhea, dizziness, shortness of breath, chest pain, bleeding, unexplained bruising, or if you have any other new/concerning symptoms, questions or concerns.      If you are having any concerning symptoms or wish to speak to a provider before your next infusion visit, please call triage to notify them so we can adequately serve you.     If you need a refill on a narcotic prescription or other medication, please call  before your infusion appointment.

## 2024-10-08 NOTE — NURSING NOTE
Chief Complaint   Patient presents with    Blood Draw     Labs drawn from PIV placed by RN in lab     Labs drawn from PIV placed by RN. Line flushed with saline. Vitals taken. Pt checked in for appointment(s).     Blanca Rioz RN

## 2024-10-08 NOTE — PROGRESS NOTES
Infusion Nursing Note:  Pedrito Jenkins presents today for Cycle 3, Day 1- Doxorubicin, Vincristine, Cyclophosphamide, and Rapid Rituximab-abbs.    Patient seen by provider today: Yes: Dr. Lu 10/7/24   present during visit today: Not Applicable.    Note: Patient reports feeling well on arrival to infusion suite today. Denies the following signs of infection: no fever, cough, chills, rash, congestions, sore throat, chest pain, shortness of breath, diarrhea, or urinary concerns. No new changes or concerns overnight following provider visit. Consents to proceeding with treatment today.       Intravenous Access:  Peripheral IV placed.    Treatment Conditions:  Lab Results   Component Value Date    HGB 14.1 10/08/2024    WBC 3.0 (L) 10/08/2024    ANEUTAUTO 1.6 10/08/2024     10/08/2024        Lab Results   Component Value Date     10/08/2024    POTASSIUM 4.1 10/08/2024    CR 0.98 10/08/2024    NEY 9.9 10/08/2024    BILITOTAL 0.4 10/08/2024    ALBUMIN 4.4 10/08/2024    ALT 37 10/08/2024    AST 34 10/08/2024       Results reviewed, labs MET treatment parameters, ok to proceed with treatment.  ECHO/MUGA completed 8/16/24  EF 60-65%.      Post Infusion Assessment:  Patient tolerated infusion without incident.  Blood return noted pre and post infusion.  Blood return noted during administration every 2 cc.  Site patent and intact, free from redness, edema or discomfort.  No evidence of extravasations.  Access discontinued per protocol.       Discharge Plan:   Patient declined prescription refills.  Discharge instructions reviewed with: Patient and Family.  Patient and/or family verbalized understanding of discharge instructions and all questions answered.  AVS to patient via TrackT.  Patient will return 10/29/24 for next appointment.   Patient discharged in stable condition accompanied by: wife.  Departure Mode: Ambulatory.      Loulou Ca RN

## 2024-10-09 ENCOUNTER — NURSE TRIAGE (OUTPATIENT)
Dept: ONCOLOGY | Facility: CLINIC | Age: 54
End: 2024-10-09
Payer: COMMERCIAL

## 2024-10-09 NOTE — TELEPHONE ENCOUNTER
Pt calling to follow up on sx after his 3rd chemo cycle.   Pt states the lymph nodes in his neck are swollen today.   Pt also reporting runny nose and watery eyes.   Pt states he does not have hx of seasonal allergies. Pt also reports he does not believe he has been around people who are sick.    Denies F/C, chest pain, SOB, N/V, problems with bowels or bladder, cough, sore throat.      Advised pt to push fluids and rest. Recommended pt take home COVID test if he starts having sore throat, cough,etc. to rule it out. Reviewed with pt continuing to perform good hand hygiene. Requested pt call back if sx do not improve worsen.

## 2024-10-23 ENCOUNTER — TELEPHONE (OUTPATIENT)
Dept: ONCOLOGY | Facility: CLINIC | Age: 54
End: 2024-10-23
Payer: COMMERCIAL

## 2024-10-23 NOTE — TELEPHONE ENCOUNTER
STD forms received via [employer]ax from [facility] [employer].      Forms to be completed and put in folder for provider to approve.    Fax #:  [phone]  Claim: [id number]    Allyn Ayala

## 2024-10-25 ENCOUNTER — TELEPHONE (OUTPATIENT)
Dept: ONCOLOGY | Facility: CLINIC | Age: 54
End: 2024-10-25
Payer: COMMERCIAL

## 2024-10-25 NOTE — TELEPHONE ENCOUNTER
STD paperwork completed, checked for accuracy, signed and faxed to [employer]  @ [phone]. A copy was made, sent to scanning and original mailed to patient at home address.    Successful transmission verified in [employer] Fax.    Blessing Cotton

## 2024-10-25 NOTE — PROGRESS NOTES
[facility]  Date of visit: 10/29/2024      Reason for Visit: Follow-up for nodular lymphocyte predominant Hodgkin lymphoma.      Oncology HPI: Pedrito is a 53 year old man with nodular lymphocyte predominant Hodgkin lymphoma.  Patient had been in his usual state of health until June, 2024 when he developed gradually progressive nausea and mild abdominal discomfort.  He also had some loose stools but not watery diarrhea.  The symptoms led to medical attention.  Initially, an exhaustive workup for gastrointestinal pathology was done; IgA- 37, Vitamin B12-WNL, vitamin D-WNL, ESR-WNL, CRP-WNL, TSH-WNL, issue transglutaminase antibodies- neg, lipase levels-WNL, calprotectin- neg, H. Pylori stool antigen- neg, enteric bacterial panel- positive for enterotoxigenic E. Coli.  He was given an antibiotic for 1 dose and that did not resolve his symptoms.  7/23/24 CT scan was done as well to further investigate patient's nausea and abdominal pain which showed 5 cm x 4.7 cm x 5.3 cm enhancing soft tissue mass in the mesentery and there are scattered mesenteric lymphadenopathy surrounding this lesion and mesenteric stranding and haziness.  He subsequently saw Dr. Werner from general surgery and underwent laparoscopic biopsy of the mass on 8/6/2024.  Patient also had umbilical hernia that was repaired.  Patient recovered from the surgery without any major complications.  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  Subsequent PET scan done on 8/16/24 showed hypermetabolic mesenteric mass correlating with the prior CT scan with SUV max of 11.  There are scattered hypermetabolic lymphadenopathy in the mesentery surrounding the mass as well as mesenteric stranding. Patient does not have any major cytopenias on peripheral blood work as well as no renal or kidney abnormalities.      He was started on R-CHOP (C1D1=8/28/24).  10/4/24 pet s/p C2 demonstrated  no evidence of disease,  metabolic CR.    Interval history:   Pedrito presents to clinic for toxicity check prior to C4D1 R-CHOP.   He is been doing alright.  He feels his physical activity has improved a bit due to being able to get out running.  He feels mentally he has been more down and depressed.  Denies suicidal or homicidal ideation.  Feels activity does help a bit with his mental health. His appetite is good and is eating and drinking well.  He has noticed some chills but will slowly resolve with blankets.   He did have some constipation he managed with Senna.     Denies fevers, night sweats, n/v/d, rashes/sores, new pains, new lumps/bumps, all other acute issues or concerns.    Current Outpatient Medications   Medication Sig Dispense Refill    acetaminophen (TYLENOL) 325 MG tablet Take 2 tablets (650 mg) by mouth every 4 hours as needed for mild pain      escitalopram (LEXAPRO) 5 MG tablet Take 1 tablet (5 mg) by mouth daily. 60 tablet 0    ondansetron (ZOFRAN) 8 MG tablet Take 1 tablet (8 mg) by mouth every 8 hours as needed for nausea (vomiting). Do not start before August 25, 2024. 30 tablet 2    predniSONE (DELTASONE) 10 MG tablet TAKE 5 TABLETS (50 MG) BY MOUTH 2 TIMES DAILY FOR 5 DAYS. TAKE ON DAYS 1 THROUGH 5. TAKE FIRST DOSE IN AM PRIOR TO CHEMOTHERAPY. DO NOT START BEFORE AUGUST 25, 2024.*      prochlorperazine (COMPAZINE) 10 MG tablet Take 1 tablet (10 mg) by mouth every 6 hours as needed for nausea or vomiting. Do not start before August 25, 2024. 30 tablet 2    SENNA-docusate sodium (SENNA S) 8.6-50 MG tablet Take 1 tablet by mouth at bedtime. 30 tablet 1    acyclovir (ZOVIRAX) 400 MG tablet Take 1 tablet (400 mg) by mouth every 12 hours. 60 tablet 11       No Known Allergies      Physical Exam:  /76   Pulse 62   Temp 97.8  F (36.6  C) (Oral)   Resp 16   Wt 88.3 kg (194 lb 11.2 oz)   SpO2 97%   BMI 28.75 kg/m    General: No acute distress, pleasant, well-groomed  HEENT: EOMI, PERRL. Sclerae are  anicteric.  Lymph: Neck is supple with no lymphadenopathy in the cervical, supraclavicular, or axillary areas.   Heart: Regular rate and rhythm.   Lungs: Clear to auscultation bilaterally.   Extremities: No lower extremity edema noted bilaterally.   Neuro: Alert and oriented x3, CN grossly intact, steady gait  Skin: No rashes, petechiae, or bruising noted on exposed skin.      Labs:   Most Recent 3 CBC's:  Recent Labs   Lab Test 10/29/24  1054 10/08/24  0639 09/18/24  1000   WBC 4.4 3.0* 3.8*   HGB 13.0* 14.1 14.6   MCV 87 86 86    210 219   ANEUTAUTO 2.7 1.6 2.5     Most Recent 3 BMP's:  Recent Labs   Lab Test 10/08/24  0639 09/18/24  1000 08/28/24  0657    139 140   POTASSIUM 4.1 4.1 4.0   CHLORIDE 104 106 105   CO2 23 22 22   BUN 12.8 14.6 17.9   CR 0.98 0.99 0.95   ANIONGAP 12 11 13   NEY 9.9 9.8 9.6   * 110* 98   PROTTOTAL 6.6 6.6 6.8   ALBUMIN 4.4 4.4 4.5    Most Recent 3 LFT's:  Recent Labs   Lab Test 10/08/24  0639 09/18/24  1000 08/28/24  0657   AST 34 27 30   ALT 37 28 28   ALKPHOS 86 86 108   BILITOTAL 0.4 0.3 0.4    Most Recent 2 TSH and T4:  Recent Labs   Lab Test 06/21/24  0853   TSH 2.17     I reviewed the above labs today.    Impression/plan:     Nodular lymphocyte predominant Hodgkin lymphoma:  - He is in CR without any radiographic evidence of lymphoma.  He continues to tolerate treatment well without significant side-effects/toxicities.   - We will continue with RCHOP for total 4 cycles.   - PET scan after cycle 4 and follow-up with Dr. Lu     - Proceed with C4D1 R-CHOP today (10/29)    10/30/24 Addendum: Received a call from Dr. Lu who would like Pedrito to receive 6 cycle of R-CHOP instead of the originally planned 4 cycles to reduce chances of relapse.     Steroid induced insomnia and irritability:  - Reduce prednisone to 50 mg daily.     Depressed Mood:  Worsening depressed mood and lacking motivation/ambition to do things he used to enjoy.  Denied suicidal or  homicidal ideation and discussed options today.  Discussed starting a selective serotonin reuptake inhibitor, Lexapro, today (10/29).  He has been hesitant to start treatment due to potential side effects so decided to start low-dose lexapro 5 mg daily and we can discuss increasing after 2-3 weeks but also discussed full effects can take 4-6 weeks.  Also referred to Psych onc.   - He will reach out if he notices worsening moods or suicidal ideations.   - Will continue to monitor     Constipation:  Managing with Senna    26 minutes spent on the date of the encounter doing chart review, review of test results, interpretation of tests, patient visit, and documentation     PARAS Hamilton [facility]  [address] [place] [address] [phone]

## 2024-10-25 NOTE — TELEPHONE ENCOUNTER
STD forms received via Email from [email or web address]      Forms to be completed and put in folder for provider to approve.    MAIL TO: [employer]        [address] [address] [place] [address]     Blessing Cotton

## 2024-10-29 ENCOUNTER — ONCOLOGY VISIT (OUTPATIENT)
Dept: ONCOLOGY | Facility: CLINIC | Age: 54
End: 2024-10-29
Payer: COMMERCIAL

## 2024-10-29 ENCOUNTER — APPOINTMENT (OUTPATIENT)
Dept: LAB | Facility: CLINIC | Age: 54
End: 2024-10-29
Payer: COMMERCIAL

## 2024-10-29 ENCOUNTER — INFUSION THERAPY VISIT (OUTPATIENT)
Dept: ONCOLOGY | Facility: CLINIC | Age: 54
End: 2024-10-29
Payer: COMMERCIAL

## 2024-10-29 VITALS
HEART RATE: 62 BPM | BODY MASS INDEX: 28.75 KG/M2 | SYSTOLIC BLOOD PRESSURE: 124 MMHG | DIASTOLIC BLOOD PRESSURE: 76 MMHG | OXYGEN SATURATION: 97 % | RESPIRATION RATE: 16 BRPM | TEMPERATURE: 97.8 F | WEIGHT: 194.7 LBS

## 2024-10-29 DIAGNOSIS — F43.21 ADJUSTMENT DISORDER WITH DEPRESSED MOOD: ICD-10-CM

## 2024-10-29 DIAGNOSIS — C81.03 NODULAR LYMPHOCYTE PREDOMINANT HODGKIN LYMPHOMA OF INTRA-ABDOMINAL LYMPH NODES (H): Primary | ICD-10-CM

## 2024-10-29 DIAGNOSIS — K59.03 DRUG-INDUCED CONSTIPATION: ICD-10-CM

## 2024-10-29 LAB
ALBUMIN SERPL BCG-MCNC: 4.4 G/DL (ref 3.5–5.2)
ALP SERPL-CCNC: 81 U/L (ref 40–150)
ALT SERPL W P-5'-P-CCNC: 47 U/L (ref 0–70)
ANION GAP SERPL CALCULATED.3IONS-SCNC: 11 MMOL/L (ref 7–15)
AST SERPL W P-5'-P-CCNC: 43 U/L (ref 0–45)
BASOPHILS # BLD AUTO: 0.1 10E3/UL (ref 0–0.2)
BASOPHILS NFR BLD AUTO: 1 %
BILIRUB SERPL-MCNC: 0.4 MG/DL
BUN SERPL-MCNC: 13.6 MG/DL (ref 6–20)
CALCIUM SERPL-MCNC: 9.7 MG/DL (ref 8.8–10.4)
CHLORIDE SERPL-SCNC: 105 MMOL/L (ref 98–107)
CREAT SERPL-MCNC: 1.04 MG/DL (ref 0.67–1.17)
EGFRCR SERPLBLD CKD-EPI 2021: 86 ML/MIN/1.73M2
EOSINOPHIL # BLD AUTO: 0.1 10E3/UL (ref 0–0.7)
EOSINOPHIL NFR BLD AUTO: 1 %
ERYTHROCYTE [DISTWIDTH] IN BLOOD BY AUTOMATED COUNT: 14.2 % (ref 10–15)
GLUCOSE SERPL-MCNC: 99 MG/DL (ref 70–99)
HCO3 SERPL-SCNC: 23 MMOL/L (ref 22–29)
HCT VFR BLD AUTO: 36.6 % (ref 40–53)
HGB BLD-MCNC: 13 G/DL (ref 13.3–17.7)
IMM GRANULOCYTES # BLD: 0.1 10E3/UL
IMM GRANULOCYTES NFR BLD: 2 %
LYMPHOCYTES # BLD AUTO: 0.6 10E3/UL (ref 0.8–5.3)
LYMPHOCYTES NFR BLD AUTO: 12 %
MCH RBC QN AUTO: 31 PG (ref 26.5–33)
MCHC RBC AUTO-ENTMCNC: 35.5 G/DL (ref 31.5–36.5)
MCV RBC AUTO: 87 FL (ref 78–100)
MONOCYTES # BLD AUTO: 1 10E3/UL (ref 0–1.3)
MONOCYTES NFR BLD AUTO: 23 %
NEUTROPHILS # BLD AUTO: 2.7 10E3/UL (ref 1.6–8.3)
NEUTROPHILS NFR BLD AUTO: 60 %
NRBC # BLD AUTO: 0 10E3/UL
NRBC BLD AUTO-RTO: 0 /100
PLATELET # BLD AUTO: 252 10E3/UL (ref 150–450)
POTASSIUM SERPL-SCNC: 4.1 MMOL/L (ref 3.4–5.3)
PROT SERPL-MCNC: 6.8 G/DL (ref 6.4–8.3)
RBC # BLD AUTO: 4.19 10E6/UL (ref 4.4–5.9)
SODIUM SERPL-SCNC: 139 MMOL/L (ref 135–145)
WBC # BLD AUTO: 4.4 10E3/UL (ref 4–11)

## 2024-10-29 PROCEDURE — 258N000003 HC RX IP 258 OP 636

## 2024-10-29 PROCEDURE — 96411 CHEMO IV PUSH ADDL DRUG: CPT

## 2024-10-29 PROCEDURE — 85004 AUTOMATED DIFF WBC COUNT: CPT

## 2024-10-29 PROCEDURE — G2211 COMPLEX E/M VISIT ADD ON: HCPCS

## 2024-10-29 PROCEDURE — 99213 OFFICE O/P EST LOW 20 MIN: CPT | Mod: 25

## 2024-10-29 PROCEDURE — 96417 CHEMO IV INFUS EACH ADDL SEQ: CPT

## 2024-10-29 PROCEDURE — 96375 TX/PRO/DX INJ NEW DRUG ADDON: CPT

## 2024-10-29 PROCEDURE — 250N000011 HC RX IP 250 OP 636

## 2024-10-29 PROCEDURE — 82947 ASSAY GLUCOSE BLOOD QUANT: CPT

## 2024-10-29 PROCEDURE — 36415 COLL VENOUS BLD VENIPUNCTURE: CPT

## 2024-10-29 PROCEDURE — 84155 ASSAY OF PROTEIN SERUM: CPT

## 2024-10-29 PROCEDURE — 99213 OFFICE O/P EST LOW 20 MIN: CPT

## 2024-10-29 PROCEDURE — 96413 CHEMO IV INFUSION 1 HR: CPT

## 2024-10-29 PROCEDURE — 250N000013 HC RX MED GY IP 250 OP 250 PS 637

## 2024-10-29 PROCEDURE — 96367 TX/PROPH/DG ADDL SEQ IV INF: CPT

## 2024-10-29 PROCEDURE — 85014 HEMATOCRIT: CPT

## 2024-10-29 RX ORDER — DIPHENHYDRAMINE HCL 25 MG
50 CAPSULE ORAL ONCE
Status: COMPLETED | OUTPATIENT
Start: 2024-10-29 | End: 2024-10-29

## 2024-10-29 RX ORDER — HEPARIN SODIUM (PORCINE) LOCK FLUSH IV SOLN 100 UNIT/ML 100 UNIT/ML
5 SOLUTION INTRAVENOUS
Status: CANCELLED | OUTPATIENT
Start: 2024-10-29

## 2024-10-29 RX ORDER — ACETAMINOPHEN 325 MG/1
650 TABLET ORAL ONCE
Status: CANCELLED
Start: 2024-10-29

## 2024-10-29 RX ORDER — DOXORUBICIN HYDROCHLORIDE 2 MG/ML
50 INJECTION, SOLUTION INTRAVENOUS ONCE
Status: CANCELLED | OUTPATIENT
Start: 2024-10-29

## 2024-10-29 RX ORDER — LORAZEPAM 2 MG/ML
0.5 INJECTION INTRAMUSCULAR EVERY 4 HOURS PRN
Status: CANCELLED | OUTPATIENT
Start: 2024-10-29

## 2024-10-29 RX ORDER — DIPHENHYDRAMINE HYDROCHLORIDE 50 MG/ML
25 INJECTION INTRAMUSCULAR; INTRAVENOUS
Status: CANCELLED
Start: 2024-10-29

## 2024-10-29 RX ORDER — DIPHENHYDRAMINE HYDROCHLORIDE 50 MG/ML
50 INJECTION INTRAMUSCULAR; INTRAVENOUS
Status: CANCELLED
Start: 2024-10-29

## 2024-10-29 RX ORDER — ALBUTEROL SULFATE 0.83 MG/ML
2.5 SOLUTION RESPIRATORY (INHALATION)
Status: CANCELLED | OUTPATIENT
Start: 2024-10-29

## 2024-10-29 RX ORDER — METHYLPREDNISOLONE SODIUM SUCCINATE 125 MG/2ML
125 INJECTION INTRAMUSCULAR; INTRAVENOUS
Status: CANCELLED
Start: 2024-10-29

## 2024-10-29 RX ORDER — EPINEPHRINE 1 MG/ML
0.3 INJECTION, SOLUTION INTRAMUSCULAR; SUBCUTANEOUS EVERY 5 MIN PRN
Status: CANCELLED | OUTPATIENT
Start: 2024-10-29

## 2024-10-29 RX ORDER — ACETAMINOPHEN 325 MG/1
650 TABLET ORAL ONCE
Status: COMPLETED | OUTPATIENT
Start: 2024-10-29 | End: 2024-10-29

## 2024-10-29 RX ORDER — ESCITALOPRAM OXALATE 5 MG/1
5 TABLET ORAL DAILY
Qty: 60 TABLET | Refills: 0 | Status: SHIPPED | OUTPATIENT
Start: 2024-10-29

## 2024-10-29 RX ORDER — METHYLPREDNISOLONE SODIUM SUCCINATE 40 MG/ML
40 INJECTION INTRAMUSCULAR; INTRAVENOUS
Status: CANCELLED
Start: 2024-10-29

## 2024-10-29 RX ORDER — MEPERIDINE HYDROCHLORIDE 25 MG/ML
25 INJECTION INTRAMUSCULAR; INTRAVENOUS; SUBCUTANEOUS
Status: CANCELLED | OUTPATIENT
Start: 2024-10-29

## 2024-10-29 RX ORDER — DIPHENHYDRAMINE HYDROCHLORIDE 50 MG/ML
50 INJECTION INTRAMUSCULAR; INTRAVENOUS ONCE
Status: CANCELLED
Start: 2024-10-29

## 2024-10-29 RX ORDER — PALONOSETRON 0.05 MG/ML
0.25 INJECTION, SOLUTION INTRAVENOUS ONCE
Status: COMPLETED | OUTPATIENT
Start: 2024-10-29 | End: 2024-10-29

## 2024-10-29 RX ORDER — DOXORUBICIN HYDROCHLORIDE 2 MG/ML
50 INJECTION, SOLUTION INTRAVENOUS ONCE
Status: COMPLETED | OUTPATIENT
Start: 2024-10-29 | End: 2024-10-29

## 2024-10-29 RX ORDER — DIPHENHYDRAMINE HCL 25 MG
50 CAPSULE ORAL ONCE
Status: CANCELLED
Start: 2024-10-29

## 2024-10-29 RX ORDER — HEPARIN SODIUM,PORCINE 10 UNIT/ML
5-20 VIAL (ML) INTRAVENOUS DAILY PRN
Status: CANCELLED | OUTPATIENT
Start: 2024-10-29

## 2024-10-29 RX ORDER — PALONOSETRON 0.05 MG/ML
0.25 INJECTION, SOLUTION INTRAVENOUS ONCE
Status: CANCELLED
Start: 2024-10-29

## 2024-10-29 RX ORDER — ALBUTEROL SULFATE 90 UG/1
1-2 INHALANT RESPIRATORY (INHALATION)
Status: CANCELLED
Start: 2024-10-29

## 2024-10-29 RX ORDER — MEPERIDINE HYDROCHLORIDE 25 MG/ML
25 INJECTION INTRAMUSCULAR; INTRAVENOUS; SUBCUTANEOUS
Status: CANCELLED
Start: 2024-10-29

## 2024-10-29 RX ADMIN — VINCRISTINE SULFATE 2 MG: 1 INJECTION, SOLUTION INTRAVENOUS at 12:47

## 2024-10-29 RX ADMIN — SODIUM CHLORIDE 100 ML: 9 INJECTION, SOLUTION INTRAVENOUS at 12:09

## 2024-10-29 RX ADMIN — ACETAMINOPHEN 650 MG: 325 TABLET ORAL at 12:08

## 2024-10-29 RX ADMIN — DIPHENHYDRAMINE HYDROCHLORIDE 50 MG: 25 CAPSULE ORAL at 12:09

## 2024-10-29 RX ADMIN — FOSAPREPITANT 150 MG: 150 INJECTION, POWDER, LYOPHILIZED, FOR SOLUTION INTRAVENOUS at 11:42

## 2024-10-29 RX ADMIN — PALONOSETRON HYDROCHLORIDE 0.25 MG: 0.25 INJECTION INTRAVENOUS at 11:33

## 2024-10-29 RX ADMIN — RITUXIMAB-ABBS 800 MG: 10 INJECTION, SOLUTION INTRAVENOUS at 13:45

## 2024-10-29 RX ADMIN — CYCLOPHOSPHAMIDE 1500 MG: 1 INJECTION, POWDER, FOR SOLUTION INTRAVENOUS; ORAL at 12:54

## 2024-10-29 RX ADMIN — DOXORUBICIN HYDROCHLORIDE 100 MG: 2 INJECTION, SOLUTION INTRAVENOUS at 12:37

## 2024-10-29 ASSESSMENT — PAIN SCALES - GENERAL: PAINLEVEL_OUTOF10: NO PAIN (0)

## 2024-10-29 NOTE — LETTER
10/29/2024      Pedrito Jenkins  [address]  [place] [address]      Dear Colleague,    Thank you for referring your patient, Pedrito Jenkins, to the [facility]. Please see a copy of my visit note below.    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      Oncology HPI: Pedrito is a 53 year old man with nodular lymphocyte predominant Hodgkin lymphoma.  Patient had been in his usual state of health until June, 2024 when he developed gradually progressive nausea and mild abdominal discomfort.  He also had some loose stools but not watery diarrhea.  The symptoms led to medical attention.  Initially, an exhaustive workup for gastrointestinal pathology was done; IgA- 37, Vitamin B12-WNL, vitamin D-WNL, ESR-WNL, CRP-WNL, TSH-WNL, issue transglutaminase antibodies- neg, lipase levels-WNL, calprotectin- neg, H. Pylori stool antigen- neg, enteric bacterial panel- positive for enterotoxigenic E. Coli.  He was given an antibiotic for 1 dose and that did not resolve his symptoms.  7/23/24 CT scan was done as well to further investigate patient's nausea and abdominal pain which showed 5 cm x 4.7 cm x 5.3 cm enhancing soft tissue mass in the mesentery and there are scattered mesenteric lymphadenopathy surrounding this lesion and mesenteric stranding and haziness.  He subsequently saw Dr. Werner from general surgery and underwent laparoscopic biopsy of the mass on 8/6/2024.  Patient also had umbilical hernia that was repaired.  Patient recovered from the surgery without any major complications.  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  Subsequent PET scan done on 8/16/24 showed hypermetabolic mesenteric mass correlating with the prior CT scan with SUV max of 11.  There are scattered hypermetabolic lymphadenopathy in the mesentery surrounding the mass as well as  mesenteric stranding. Patient does not have any major cytopenias on peripheral blood work as well as no renal or kidney abnormalities.      He was started on R-CHOP (C1D1=8/28/24).  10/4/24 pet s/p C2 demonstrated  no evidence of disease, metabolic CR.    Interval history:   Pedrito presents to clinic for toxicity check prior to C4D1 R-CHOP.   He is been doing alright.  He feels his physical activity has improved a bit due to being able to get out running.  He feels mentally he has been more down and depressed.  Denies suicidal or homicidal ideation.  Feels activity does help a bit with his mental health. His appetite is good and is eating and drinking well.  He has noticed some chills but will slowly resolve with blankets.   He did have some constipation he managed with Senna.     Denies fevers, night sweats, n/v/d, rashes/sores, new pains, new lumps/bumps, all other acute issues or concerns.    Current Outpatient Medications   Medication Sig Dispense Refill     acetaminophen (TYLENOL) 325 MG tablet Take 2 tablets (650 mg) by mouth every 4 hours as needed for mild pain       escitalopram (LEXAPRO) 5 MG tablet Take 1 tablet (5 mg) by mouth daily. 60 tablet 0     ondansetron (ZOFRAN) 8 MG tablet Take 1 tablet (8 mg) by mouth every 8 hours as needed for nausea (vomiting). Do not start before August 25, 2024. 30 tablet 2     predniSONE (DELTASONE) 10 MG tablet TAKE 5 TABLETS (50 MG) BY MOUTH 2 TIMES DAILY FOR 5 DAYS. TAKE ON DAYS 1 THROUGH 5. TAKE FIRST DOSE IN AM PRIOR TO CHEMOTHERAPY. DO NOT START BEFORE AUGUST 25, 2024.*       prochlorperazine (COMPAZINE) 10 MG tablet Take 1 tablet (10 mg) by mouth every 6 hours as needed for nausea or vomiting. Do not start before August 25, 2024. 30 tablet 2     SENNA-docusate sodium (SENNA S) 8.6-50 MG tablet Take 1 tablet by mouth at bedtime. 30 tablet 1     acyclovir (ZOVIRAX) 400 MG tablet Take 1 tablet (400 mg) by mouth every 12 hours. 60 tablet 11       No Known  Allergies      Physical Exam:  /76   Pulse 62   Temp 97.8  F (36.6  C) (Oral)   Resp 16   Wt 88.3 kg (194 lb 11.2 oz)   SpO2 97%   BMI 28.75 kg/m    General: No acute distress, pleasant, well-groomed  HEENT: EOMI, PERRL. Sclerae are anicteric.  Lymph: Neck is supple with no lymphadenopathy in the cervical, supraclavicular, or axillary areas.   Heart: Regular rate and rhythm.   Lungs: Clear to auscultation bilaterally.   Extremities: No lower extremity edema noted bilaterally.   Neuro: Alert and oriented x3, CN grossly intact, steady gait  Skin: No rashes, petechiae, or bruising noted on exposed skin.      Labs:   Most Recent 3 CBC's:  Recent Labs   Lab Test 10/29/24  1054 10/08/24  0639 09/18/24  1000   WBC 4.4 3.0* 3.8*   HGB 13.0* 14.1 14.6   MCV 87 86 86    210 219   ANEUTAUTO 2.7 1.6 2.5     Most Recent 3 BMP's:  Recent Labs   Lab Test 10/08/24  0639 09/18/24  1000 08/28/24  0657    139 140   POTASSIUM 4.1 4.1 4.0   CHLORIDE 104 106 105   CO2 23 22 22   BUN 12.8 14.6 17.9   CR 0.98 0.99 0.95   ANIONGAP 12 11 13   NEY 9.9 9.8 9.6   * 110* 98   PROTTOTAL 6.6 6.6 6.8   ALBUMIN 4.4 4.4 4.5    Most Recent 3 LFT's:  Recent Labs   Lab Test 10/08/24  0639 09/18/24  1000 08/28/24  0657   AST 34 27 30   ALT 37 28 28   ALKPHOS 86 86 108   BILITOTAL 0.4 0.3 0.4    Most Recent 2 TSH and T4:  Recent Labs   Lab Test 06/21/24  0853   TSH 2.17     I reviewed the above labs today.    Impression/plan:     Nodular lymphocyte predominant Hodgkin lymphoma:  - He is in CR without any radiographic evidence of lymphoma.  He continues to tolerate treatment well without significant side-effects/toxicities.   - We will continue with RCHOP for total 4 cycles.   - PET scan after cycle 4 and follow-up with Dr. Lu     - Proceed with C4D1 R-CHOP today (10/29)    Steroid induced insomnia and irritability:  - Reduce prednisone to 50 mg daily.     Depressed Mood:  Worsening depressed mood and lacking  motivation/ambition to do things he used to enjoy.  Denied suicidal or homicidal ideation and discussed options today.  Discussed starting a selective serotonin reuptake inhibitor, Lexapro, today (10/29).  He has been hesitant to start treatment due to potential side effects so decided to start low-dose lexapro 5 mg daily and we can discuss increasing after 2-3 weeks but also discussed full effects can take 4-6 weeks.  Also referred to Psych onc.   - He will reach out if he notices worsening moods or suicidal ideations.   - Will continue to monitor     Constipation:  Managing with Senna    26 minutes spent on the date of the encounter doing chart review, review of test results, interpretation of tests, patient visit, and documentation     PARAS Hamilton CNP  [facility]  [address] [place] [address] [phone]      Again, thank you for allowing me to participate in the care of your patient.        Sincerely,        PARAS Hamilton CNP

## 2024-10-29 NOTE — PROGRESS NOTES
Infusion Nursing Note:  Pedrito Jenkins presents today for Cycle 4 Day 1 Rapid Rituxan, Cytoxan, Adriamycin, Vincristine  (RCHOP).    Patient spoke with provider today: Yes: Cherrie Govea NP    Treatment Conditions:  Component      Latest Ref Rng 10/29/2024  10:54 AM   WBC      4.0 - 11.0 10e3/uL 4.4    RBC Count      4.40 - 5.90 10e6/uL 4.19 (L)    Hemoglobin      13.3 - 17.7 g/dL 13.0 (L)    Hematocrit      40.0 - 53.0 % 36.6 (L)    MCV      78 - 100 fL 87    MCH      26.5 - 33.0 pg 31.0    MCHC      31.5 - 36.5 g/dL 35.5    RDW      10.0 - 15.0 % 14.2    Platelet Count      150 - 450 10e3/uL 252    % Neutrophils      % 60    % Lymphocytes      % 12    % Monocytes      % 23    % Eosinophils      % 1    % Basophils      % 1    % Immature Granulocytes      % 2    NRBCs per 100 WBC      <1 /100 0    Absolute Neutrophils      1.6 - 8.3 10e3/uL 2.7    Absolute Lymphocytes      0.8 - 5.3 10e3/uL 0.6 (L)    Absolute Monocytes      0.0 - 1.3 10e3/uL 1.0    Absolute Eosinophils      0.0 - 0.7 10e3/uL 0.1    Absolute Basophils      0.0 - 0.2 10e3/uL 0.1    Absolute Immature Granulocytes      <=0.4 10e3/uL 0.1    Absolute NRBCs      10e3/uL 0.0    Sodium      135 - 145 mmol/L 139    Potassium      3.4 - 5.3 mmol/L 4.1    Carbon Dioxide (CO2)      22 - 29 mmol/L 23    Anion Gap      7 - 15 mmol/L 11    Urea Nitrogen      6.0 - 20.0 mg/dL 13.6    Creatinine      0.67 - 1.17 mg/dL 1.04    GFR Estimate      >60 mL/min/1.73m2 86    Calcium      8.8 - 10.4 mg/dL 9.7    Chloride      98 - 107 mmol/L 105    Glucose      70 - 99 mg/dL 99    Alkaline Phosphatase      40 - 150 U/L 81    AST      0 - 45 U/L 43    ALT      0 - 70 U/L 47    Protein Total      6.4 - 8.3 g/dL 6.8    Albumin      3.5 - 5.2 g/dL 4.4    Bilirubin Total      <=1.2 mg/dL 0.4       Legend:  (L) Low    Note: ECHO/MUGA completed 8/16/24  EF 60-65%.    No concerns during infusion visit.     Check out orders from today:  Cherrie Govea APRN CNP   Comment: PET scan  week  of 11/25  Labs and Lu 12/2- reach out to Lu to discuss where to add to schedule     Future infusion plan will be determined after PET scan results reviewed, pt aware.    Intravenous Access:  Peripheral IV placed.    Post Infusion Assessment:  Patient tolerated infusion without incident.  Blood return noted pre and post infusion.  Blood return noted during administration every 2 cc.  No evidence of extravasations.  Access discontinued per protocol.    Discharge Plan:   Prescription(s) sent to local pharmacy: Prednisone (already picked up) and Lexapro.  Patient declined prescription refills.  Discharge instructions reviewed with: Patient.  Patient and/or family verbalized understanding of discharge instructions and all questions answered.  AVS to patient via [employer].    Patient discharged in stable condition accompanied by: wife.  Departure Mode: Ambulatory.      Libia Bran RN

## 2024-10-29 NOTE — TELEPHONE ENCOUNTER
STD paperwork completed, checked for accuracy, signed and mailed to [employer] @ [employer], [address] [address], [place] [address]. A copy was made, sent to scanning and original emailed to patient at [email or web address].    Celi Ayala

## 2024-10-29 NOTE — NURSING NOTE
"Oncology Rooming Note    October 29, 2024 11:02 AM   Pedrito Jenknis is a 53 year old male who presents for:    Chief Complaint   Patient presents with    Blood Draw     Labs drawn via PIV by RN in lab.  VS taken    Oncology Clinic Visit     Nodular lymphocyte predominant Hodgkin lymphoma of intra-abdominal lymph nodes.     Initial Vitals: /76   Pulse 62   Temp 97.8  F (36.6  C) (Oral)   Resp 16   Wt 88.3 kg (194 lb 11.2 oz)   SpO2 97%   BMI 28.75 kg/m   Estimated body mass index is 28.75 kg/m  as calculated from the following:    Height as of 9/4/24: 1.753 m (5' 9\").    Weight as of this encounter: 88.3 kg (194 lb 11.2 oz). Body surface area is 2.07 meters squared.  No Pain (0) Comment: Data Unavailable   No LMP for male patient.  Allergies reviewed: Yes  Medications reviewed: Yes    Medications: Medication refills not needed today.  Pharmacy name entered into [facility]: [id number] - [place], [place] - [address] [place] [address]    Frailty Screening:   Is the patient here for a new oncology consult visit in cancer care? 2. No      Clinical concerns:        Yvette Wheatley              "

## 2024-10-29 NOTE — NURSING NOTE
Chief Complaint   Patient presents with    Blood Draw     Labs drawn via PIV by RN in lab.  VS taken       Labs drawn from PIV placed by RN. Line flushed with saline. Vitals taken. Pt checked in for appointment(s).    Natalia Teague RN

## 2024-11-07 ENCOUNTER — VIRTUAL VISIT (OUTPATIENT)
Dept: ONCOLOGY | Facility: HOSPITAL | Age: 54
End: 2024-11-07
Payer: COMMERCIAL

## 2024-11-07 VITALS — HEIGHT: 69 IN | WEIGHT: 190 LBS | BODY MASS INDEX: 28.14 KG/M2

## 2024-11-07 DIAGNOSIS — C81.03 NODULAR LYMPHOCYTE PREDOMINANT HODGKIN LYMPHOMA OF INTRA-ABDOMINAL LYMPH NODES (H): ICD-10-CM

## 2024-11-07 DIAGNOSIS — F43.21 ADJUSTMENT DISORDER WITH DEPRESSED MOOD: Primary | ICD-10-CM

## 2024-11-07 PROCEDURE — 90832 PSYTX W PT 30 MINUTES: CPT | Mod: 95 | Performed by: PSYCHOLOGIST

## 2024-11-07 ASSESSMENT — PAIN SCALES - GENERAL: PAINLEVEL_OUTOF10: NO PAIN (0)

## 2024-11-07 NOTE — PROGRESS NOTES
Virtual Visit Details    Type of service:  Video Visit     Originating Location (pt. Location): Home    Distant Location (provider location):  On-site  Platform used for Video Visit: Talib

## 2024-11-07 NOTE — NURSING NOTE
Patient confirms medications and allergies are accurate via patients echeck in completion, and or denies any changes since last reviewed/verified.       Current patient location: [address] [place] [address] [address]  [facility] [address]    Is the patient currently in the state of [place]? YES    Visit mode:VIDEO    If the visit is dropped, the patient can be reconnected by: VIDEO VISIT: Text to cell phone:   Telephone Information:   Mobile [phone]       Will anyone else be joining the visit? NO  (If patient encounters technical issues they should call [phone]    Are changes needed to the allergy or medication list? No    Are refills needed on medications prescribed by this physician? NO    Rooming Documentation:  Questionnaire(s) not done per department protocol    Reason for visit: Consult    Amena PARADAF

## 2024-11-07 NOTE — PROGRESS NOTES
[facility] Oncology- Psychotherapy                                    Progress Note    Patient Name: Pedrito Jenkins  Date: 2024           Service Type: Individual      Session Start Time: 1355  Session End Time: 1430     Session Length: 35 mins    Session #: 1    Attendees: Client attended alone    Service Modality:  Video Visit:      Provider verified identity through the following two step process.  Patient provided:  Patient     Telemedicine Visit: The patient's condition can be safely assessed and treated via synchronous audio and visual telemedicine encounter.      Reason for Telemedicine Visit: Patient has requested telehealth visit    Originating Site (Patient Location): Patient's home    Distant Site (Provider Location): [facility] [place]    Consent:  The patient/guardian has verbally consented to: the potential risks and benefits of telemedicine (video visit) versus in person care; bill my insurance or make self-payment for services provided; and responsibility for payment of non-covered services.     Patient would like the video invitation sent by:  My Chart    Mode of Communication:  Video Conference via Amwell    Distant Location (Provider):  On-site    As the provider I attest to compliance with applicable laws and regulations related to telemedicine.    DATA  Interactive Complexity: No  Crisis: No        Progress Since Last Session (Related to Symptoms / Goals / Homework):   Symptoms:  Pt reports he has had down feelings since starting chemotherapy  Pt reports he was dx with cancer 2024. Pt reports his condition is very treatable. He has a few more treatments left.      Pt reports he not sure why he is depressed?       Pt reports he has started an anti-depressant.      Pt reports he lacks motivation. He reports fatigue.      Pt denies anxiety or worry.      Pt reports he does not talk to his wife as much and she has asked to talk more. Pt will work on finding  the motivation to stay connected to her.     Homework:  None      Episode of Care Goals: Satisfactory progress - ACTION (Actively working towards change); Intervened by reinforcing change plan / affirming steps taken     Current / Ongoing Stressors and Concerns:   Pt reports he is wondering why he has depression.      Pt reports he likes to run and it is not going quite as well as it was.      Pt reports he is working a flexible schedule. He is working remotely. He reports he works at [employer].      Pt reports when he is off for a week after chemo he does pain by numbers for a distraction/activity. He will think of other distractions.      Pt reports he has some worry about being able to bounce back to normal life from treatment.      Pt has been isolative and we discussed connecting more with people.       Social Hx:   Pt reports he is .      Pt has 4 kids, two are at home in high school. One is in college in [place]. One is in [place] and attended [employer] , and is in [employer].      Treatment Objective(s) Addressed in This Session:   identify medical stressors which contribute to feelings of depression       Intervention:   CBT: ,  Emotion Focused Therapy: ,  Solution Focused: ,    Assessments completed prior to visit:  None       ASSESSMENT: Current Emotional / Mental Status (status of significant symptoms):   Risk status (Self / Other harm or suicidal ideation)   Patient denies current fears or concerns for personal safety.   Patient denies current or recent suicidal ideation or behaviors.   Patient denies current or recent homicidal ideation or behaviors.   Patient denies current or recent self injurious behavior or ideation.   Patient denies other safety concerns.   Patient reports there has been no change in risk factors since their last session.     Patient reports there has been no change in protective factors since their last session.     Recommended that patient call 911 or go to the local ED should  there be a change in any of these risk factors     Appearance:   Appropriate    Eye Contact:   Good    Psychomotor Behavior: Normal    Attitude:   Cooperative    Orientation:   All   Speech    Rate / Production: Normal     Volume:  Normal    Mood:    Depressed    Affect:    Appropriate    Thought Content:  Clear    Thought Form:  Coherent  Logical    Insight:    Good      Medication Review:   No changes to current psychiatric medication(s)     Medication Compliance:   Yes     Changes in Health Issues:   Yes: cancer, Associated Psychological Distress     Chemical Use Review:   Substance Use: Chemical use reviewed, no active concerns identified      Tobacco Use: No current tobacco use.      Diagnosis:  1. Nodular lymphocyte predominant Hodgkin lymphoma of intra-abdominal lymph nodes (H)    2. Adjustment disorder with depressed mood        Collateral Reports Completed:   Not Applicable    PLAN: (Patient Tasks / Therapist Tasks / Other)  Return in two weeks,. Utilize coping skills discussed.         Narendra Torres LP                                                         ______________________________________________________________________    Individual Treatment Plan    Patient's Name: Pedrito Jenkins  YOB: 1970    Date of Creation: 11/7/2024    Date Treatment Plan Last Reviewed/Revised:     DSM5 Diagnoses: F43.21 adjustment disorder with depressive sx  Psychosocial / Contextual Factors: Cancer   PROMIS (reviewed every 90 days):     Referral / Collaboration:  Referral to another professional/service is not indicated at this time..    Anticipated number of session for this episode of care: 3-6 sessions  Anticipation frequency of session: Monthly  Anticipated Duration of each session: 53 or more minutes  Treatment plan will be reviewed in 90 days or when goals have been changed.       MeasurableTreatment Goal(s) related to diagnosis / functional impairment(s)  Goal 1: Patient will reduce depressive  sx.     Objective #A (Patient Action)    Patient will identify medical stressors which contribute to feelings of depression.  Status: New - Date: 11/7/24      Intervention(s)  Therapist will teach distraction skills.   .    Objective #B  Patient will identify medical stressors which contribute to feelings of depression.  Status: New - Date: 11/7/24      Intervention(s)  Therapist will teach emotional recognition/identification.   .    Objective #C  Patient will identify medical stressors which contribute to feelings of depression.  Status: New - Date: 11/7/24      Intervention(s)  Therapist will teach emotional regulation skills.   .    Patient has reviewed and agreed to the above plan.      Narendra Torres LP  November 7, 2024

## 2024-11-12 ENCOUNTER — TELEPHONE (OUTPATIENT)
Dept: ONCOLOGY | Facility: CLINIC | Age: 54
End: 2024-11-12
Payer: COMMERCIAL

## 2024-11-12 DIAGNOSIS — C81.03 NODULAR LYMPHOCYTE PREDOMINANT HODGKIN LYMPHOMA OF INTRA-ABDOMINAL LYMPH NODES (H): ICD-10-CM

## 2024-11-12 NOTE — TELEPHONE ENCOUNTER
STD forms received via RightFax from [facility] [employer].      Forms to be completed and put in folder for provider to approve.    Fax #:  [phone]  Claim: [id number]    Allyn Ayala

## 2024-11-15 NOTE — TELEPHONE ENCOUNTER
STD paperwork completed, checked for accuracy, signed and faxed to [employer] @ [phone]. A copy was made, sent to scanning and original mailed to patient at home address.    Successful transmission verified in Right Fax.    Blessing Cotton

## 2024-11-17 NOTE — PROGRESS NOTES
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      Oncology HPI: Pedrito is a 53 year old man with nodular lymphocyte predominant Hodgkin lymphoma.  Patient had been in his usual state of health until June, 2024 when he developed gradually progressive nausea and mild abdominal discomfort.  He also had some loose stools but not watery diarrhea.  The symptoms led to medical attention.  Initially, an exhaustive workup for gastrointestinal pathology was done; IgA- 37, Vitamin B12-WNL, vitamin D-WNL, ESR-WNL, CRP-WNL, TSH-WNL, issue transglutaminase antibodies- neg, lipase levels-WNL, calprotectin- neg, H. Pylori stool antigen- neg, enteric bacterial panel- positive for enterotoxigenic E. Coli.  He was given an antibiotic for 1 dose and that did not resolve his symptoms.  7/23/24 CT scan was done as well to further investigate patient's nausea and abdominal pain which showed 5 cm x 4.7 cm x 5.3 cm enhancing soft tissue mass in the mesentery and there are scattered mesenteric lymphadenopathy surrounding this lesion and mesenteric stranding and haziness.  He subsequently saw Dr. Werner from general surgery and underwent laparoscopic biopsy of the mass on 8/6/2024.  Patient also had umbilical hernia that was repaired.  Patient recovered from the surgery without any major complications.  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  Subsequent PET scan done on 8/16/24 showed hypermetabolic mesenteric mass correlating with the prior CT scan with SUV max of 11.  There are scattered hypermetabolic lymphadenopathy in the mesentery surrounding the mass as well as mesenteric stranding. Patient does not have any major cytopenias on peripheral blood work as well as no renal or kidney abnormalities.      He was started on R-CHOP (C1D1=8/28/24).  10/4/24 pet s/p C2 demonstrated  no evidence of disease,  metabolic CR.    Interval history:   Pedrito presents to clinic for toxicity check prior to C5D1 R-CHOP.  He is overall doing well.  He has had more nausea this past cycle which he feels has been mild and manageable with anti-emetics.  He feels the Lexapro has been beneficial for  his moods.  His appetite is stable and is eating and drinking well.  Denies fevers, night sweats, diarrhea, constipation, neuropathy, mouth sores, rashes/sores, new pains, new lumps/bumps, all other acute issues or concerns.    Current Outpatient Medications   Medication Sig Dispense Refill    acetaminophen (TYLENOL) 325 MG tablet Take 2 tablets (650 mg) by mouth every 4 hours as needed for mild pain      allopurinol (ZYLOPRIM) 300 MG tablet Take 1 tablet (300 mg) by mouth daily.*      escitalopram (LEXAPRO) 10 MG tablet Take 1 tablet (10 mg) by mouth daily. 30 tablet 1    ondansetron (ZOFRAN) 8 MG tablet Take 1 tablet (8 mg) by mouth every 8 hours as needed for nausea (vomiting). Do not start before August 25, 2024. 30 tablet 2    predniSONE (DELTASONE) 10 MG tablet TAKE 5 TABLETS (50 MG) BY MOUTH 2 TIMES DAILY FOR 5 DAYS. TAKE ON DAYS 1 THROUGH 5. TAKE FIRST DOSE IN AM PRIOR TO CHEMOTHERAPY. DO NOT START BEFORE AUGUST 25, 2024.*      prochlorperazine (COMPAZINE) 10 MG tablet Take 1 tablet (10 mg) by mouth every 6 hours as needed for nausea or vomiting. 60 tablet 2    SENNA-docusate sodium (SENNA S) 8.6-50 MG tablet Take 1 tablet by mouth at bedtime. 30 tablet 1    acyclovir (ZOVIRAX) 400 MG tablet Take 1 tablet (400 mg) by mouth every 12 hours. 60 tablet 11       No Known Allergies      Physical Exam:  /76 (BP Location: Right arm, Patient Position: Sitting, Cuff Size: Adult Regular)   Pulse 61   Temp 98.2  F (36.8  C) (Oral)   Resp 16   Wt 88.4 kg (194 lb 12.8 oz)   SpO2 97%   BMI 28.77 kg/m    General: No acute distress, pleasant, well-groomed   HEENT: EOMI, PERRL. Sclerae are anicteric. Oral exam deferred  Lymph: Neck is  supple with no lymphadenopathy in the cervical, supraclavicular, or axillary areas.   Heart: Regular rate and rhythm.   Lungs: Clear to auscultation bilaterally.   Extremities: No lower extremity edema noted bilaterally.   Neuro: Alert and oriented x3, CN grossly intact, steady gait  Skin: No rashes, petechiae, or bruising noted on exposed skin.      Labs:   Most Recent 3 CBC's:  Recent Labs   Lab Test 11/19/24  0700 10/29/24  1054 10/08/24  0639   WBC 3.4* 4.4 3.0*   HGB 12.1* 13.0* 14.1   MCV 89 87 86    252 210   ANEUTAUTO 2.1 2.7 1.6     Most Recent 3 BMP's:  Recent Labs   Lab Test 10/29/24  1054 10/08/24  0639 09/18/24  1000    139 139   POTASSIUM 4.1 4.1 4.1   CHLORIDE 105 104 106   CO2 23 23 22   BUN 13.6 12.8 14.6   CR 1.04 0.98 0.99   ANIONGAP 11 12 11   NEY 9.7 9.9 9.8   GLC 99 115* 110*   PROTTOTAL 6.8 6.6 6.6   ALBUMIN 4.4 4.4 4.4    Most Recent 3 LFT's:  Recent Labs   Lab Test 10/29/24  1054 10/08/24  0639 09/18/24  1000   AST 43 34 27   ALT 47 37 28   ALKPHOS 81 86 86   BILITOTAL 0.4 0.4 0.3    Most Recent 2 TSH and T4:  Recent Labs   Lab Test 06/21/24  0853   TSH 2.17     I reviewed the above labs today.    Impression/plan:     Nodular lymphocyte predominant Hodgkin lymphoma:  - He is in CR without any radiographic evidence of lymphoma.  He continues to tolerate treatment well without significant side-effects/toxicities.   - We will continue with RCHOP for total 6 cycles.   - PET scan after cycle 6 and follow-up with Dr. Lu     - Proceed with C5D1 R-CHOP today (11/19)- he is tolerating treatment well other than some mild nausea    Nausea:  D/t chemo- managed with Compazine PRN    Constipation:  Managing with Senna    Steroid induced insomnia and irritability:  - Reduce prednisone to 50 mg daily.     Depressed Mood:  Worsening depressed mood and lacking motivation/ambition to do things he used to enjoy.  Denied suicidal or homicidal ideation and discussed options today.  Discussed  starting a selective serotonin reuptake inhibitor, Lexapro.He has been hesitant to start treatment due to potential side effects so decided to start low-dose lexapro 5 mg dailyon 10/29 and we can discuss increasing after 2-3 weeks but also discussed full effects can take 4-6 weeks.  He is currently on Lexapro 10 mg daily and has noticed improvement in his moods.  - Consulted with Psych Onc 11/7      The longitudinal plan of care for the diagnosis(es)/condition(s) as documented were addressed during this visit. Due to the added complexity in care, I will continue to support Pedrito in the subsequent management and with ongoing continuity of care.     PARAS Hamilton [facility]  [address] [place] [address] [phone]

## 2024-11-19 ENCOUNTER — INFUSION THERAPY VISIT (OUTPATIENT)
Dept: ONCOLOGY | Facility: CLINIC | Age: 54
End: 2024-11-19
Payer: COMMERCIAL

## 2024-11-19 ENCOUNTER — ONCOLOGY VISIT (OUTPATIENT)
Dept: ONCOLOGY | Facility: CLINIC | Age: 54
End: 2024-11-19
Payer: COMMERCIAL

## 2024-11-19 VITALS
RESPIRATION RATE: 16 BRPM | DIASTOLIC BLOOD PRESSURE: 76 MMHG | HEART RATE: 61 BPM | OXYGEN SATURATION: 97 % | BODY MASS INDEX: 28.77 KG/M2 | WEIGHT: 194.8 LBS | SYSTOLIC BLOOD PRESSURE: 123 MMHG | TEMPERATURE: 98.2 F

## 2024-11-19 DIAGNOSIS — C81.03 NODULAR LYMPHOCYTE PREDOMINANT HODGKIN LYMPHOMA OF INTRA-ABDOMINAL LYMPH NODES (H): Primary | ICD-10-CM

## 2024-11-19 DIAGNOSIS — C81.03 NODULAR LYMPHOCYTE PREDOMINANT HODGKIN LYMPHOMA OF INTRA-ABDOMINAL LYMPH NODES (H): ICD-10-CM

## 2024-11-19 DIAGNOSIS — G47.9 DIFFICULTY SLEEPING: ICD-10-CM

## 2024-11-19 DIAGNOSIS — R11.0 NAUSEA: ICD-10-CM

## 2024-11-19 DIAGNOSIS — F43.21 ADJUSTMENT DISORDER WITH DEPRESSED MOOD: ICD-10-CM

## 2024-11-19 DIAGNOSIS — K59.03 DRUG-INDUCED CONSTIPATION: ICD-10-CM

## 2024-11-19 DIAGNOSIS — C81.00 NODULAR LYMPHOCYTE PREDOMINANT HODGKIN LYMPHOMA, UNSPECIFIED BODY REGION (H): Primary | ICD-10-CM

## 2024-11-19 LAB
ALBUMIN SERPL BCG-MCNC: 4.2 G/DL (ref 3.5–5.2)
ALP SERPL-CCNC: 89 U/L (ref 40–150)
ALT SERPL W P-5'-P-CCNC: 38 U/L (ref 0–70)
ANION GAP SERPL CALCULATED.3IONS-SCNC: 12 MMOL/L (ref 7–15)
AST SERPL W P-5'-P-CCNC: 40 U/L (ref 0–45)
BASOPHILS # BLD AUTO: 0 10E3/UL (ref 0–0.2)
BASOPHILS NFR BLD AUTO: 1 %
BILIRUB SERPL-MCNC: 0.2 MG/DL
BUN SERPL-MCNC: 14.9 MG/DL (ref 6–20)
CALCIUM SERPL-MCNC: 9.4 MG/DL (ref 8.8–10.4)
CHLORIDE SERPL-SCNC: 106 MMOL/L (ref 98–107)
CREAT SERPL-MCNC: 0.98 MG/DL (ref 0.67–1.17)
EGFRCR SERPLBLD CKD-EPI 2021: >90 ML/MIN/1.73M2
EOSINOPHIL # BLD AUTO: 0.1 10E3/UL (ref 0–0.7)
EOSINOPHIL NFR BLD AUTO: 2 %
ERYTHROCYTE [DISTWIDTH] IN BLOOD BY AUTOMATED COUNT: 14.6 % (ref 10–15)
GLUCOSE SERPL-MCNC: 134 MG/DL (ref 70–99)
HCO3 SERPL-SCNC: 22 MMOL/L (ref 22–29)
HCT VFR BLD AUTO: 34.1 % (ref 40–53)
HGB BLD-MCNC: 12.1 G/DL (ref 13.3–17.7)
IMM GRANULOCYTES # BLD: 0 10E3/UL
IMM GRANULOCYTES NFR BLD: 1 %
LYMPHOCYTES # BLD AUTO: 0.5 10E3/UL (ref 0.8–5.3)
LYMPHOCYTES NFR BLD AUTO: 14 %
MCH RBC QN AUTO: 31.7 PG (ref 26.5–33)
MCHC RBC AUTO-ENTMCNC: 35.5 G/DL (ref 31.5–36.5)
MCV RBC AUTO: 89 FL (ref 78–100)
MONOCYTES # BLD AUTO: 0.7 10E3/UL (ref 0–1.3)
MONOCYTES NFR BLD AUTO: 19 %
NEUTROPHILS # BLD AUTO: 2.1 10E3/UL (ref 1.6–8.3)
NEUTROPHILS NFR BLD AUTO: 62 %
NRBC # BLD AUTO: 0 10E3/UL
NRBC BLD AUTO-RTO: 0 /100
PLATELET # BLD AUTO: 292 10E3/UL (ref 150–450)
POTASSIUM SERPL-SCNC: 3.8 MMOL/L (ref 3.4–5.3)
PROT SERPL-MCNC: 6.3 G/DL (ref 6.4–8.3)
RBC # BLD AUTO: 3.82 10E6/UL (ref 4.4–5.9)
SODIUM SERPL-SCNC: 140 MMOL/L (ref 135–145)
WBC # BLD AUTO: 3.4 10E3/UL (ref 4–11)

## 2024-11-19 PROCEDURE — 96375 TX/PRO/DX INJ NEW DRUG ADDON: CPT

## 2024-11-19 PROCEDURE — 85025 COMPLETE CBC W/AUTO DIFF WBC: CPT

## 2024-11-19 PROCEDURE — 80053 COMPREHEN METABOLIC PANEL: CPT

## 2024-11-19 PROCEDURE — G2211 COMPLEX E/M VISIT ADD ON: HCPCS

## 2024-11-19 PROCEDURE — 96411 CHEMO IV PUSH ADDL DRUG: CPT

## 2024-11-19 PROCEDURE — 258N000003 HC RX IP 258 OP 636

## 2024-11-19 PROCEDURE — 99215 OFFICE O/P EST HI 40 MIN: CPT

## 2024-11-19 PROCEDURE — 96417 CHEMO IV INFUS EACH ADDL SEQ: CPT

## 2024-11-19 PROCEDURE — 250N000013 HC RX MED GY IP 250 OP 250 PS 637

## 2024-11-19 PROCEDURE — 96415 CHEMO IV INFUSION ADDL HR: CPT

## 2024-11-19 PROCEDURE — 96413 CHEMO IV INFUSION 1 HR: CPT

## 2024-11-19 PROCEDURE — 36415 COLL VENOUS BLD VENIPUNCTURE: CPT

## 2024-11-19 PROCEDURE — 96367 TX/PROPH/DG ADDL SEQ IV INF: CPT

## 2024-11-19 PROCEDURE — 99213 OFFICE O/P EST LOW 20 MIN: CPT

## 2024-11-19 PROCEDURE — 82040 ASSAY OF SERUM ALBUMIN: CPT

## 2024-11-19 PROCEDURE — 250N000011 HC RX IP 250 OP 636

## 2024-11-19 RX ORDER — METHYLPREDNISOLONE SODIUM SUCCINATE 125 MG/2ML
125 INJECTION INTRAMUSCULAR; INTRAVENOUS
Status: CANCELLED
Start: 2024-11-19

## 2024-11-19 RX ORDER — MEPERIDINE HYDROCHLORIDE 25 MG/ML
25 INJECTION INTRAMUSCULAR; INTRAVENOUS; SUBCUTANEOUS
Status: CANCELLED
Start: 2024-11-19

## 2024-11-19 RX ORDER — DIPHENHYDRAMINE HYDROCHLORIDE 50 MG/ML
25 INJECTION INTRAMUSCULAR; INTRAVENOUS
Status: CANCELLED
Start: 2024-11-19

## 2024-11-19 RX ORDER — HEPARIN SODIUM (PORCINE) LOCK FLUSH IV SOLN 100 UNIT/ML 100 UNIT/ML
5 SOLUTION INTRAVENOUS
Status: CANCELLED | OUTPATIENT
Start: 2024-11-19

## 2024-11-19 RX ORDER — EPINEPHRINE 1 MG/ML
0.3 INJECTION, SOLUTION INTRAMUSCULAR; SUBCUTANEOUS EVERY 5 MIN PRN
Status: CANCELLED | OUTPATIENT
Start: 2024-11-19

## 2024-11-19 RX ORDER — DIPHENHYDRAMINE HYDROCHLORIDE 50 MG/ML
50 INJECTION INTRAMUSCULAR; INTRAVENOUS ONCE
Status: CANCELLED
Start: 2024-11-19

## 2024-11-19 RX ORDER — ACETAMINOPHEN 325 MG/1
650 TABLET ORAL ONCE
Status: COMPLETED | OUTPATIENT
Start: 2024-11-19 | End: 2024-11-19

## 2024-11-19 RX ORDER — PALONOSETRON 0.05 MG/ML
0.25 INJECTION, SOLUTION INTRAVENOUS ONCE
Status: CANCELLED
Start: 2024-11-19

## 2024-11-19 RX ORDER — PREDNISONE 50 MG/1
50 TABLET ORAL DAILY
Qty: 5 TABLET | Refills: 1 | Status: SHIPPED | OUTPATIENT
Start: 2024-11-19

## 2024-11-19 RX ORDER — HEPARIN SODIUM,PORCINE 10 UNIT/ML
5-20 VIAL (ML) INTRAVENOUS DAILY PRN
Status: CANCELLED | OUTPATIENT
Start: 2024-11-19

## 2024-11-19 RX ORDER — DIPHENHYDRAMINE HYDROCHLORIDE 50 MG/ML
50 INJECTION INTRAMUSCULAR; INTRAVENOUS
Status: CANCELLED
Start: 2024-11-19

## 2024-11-19 RX ORDER — ESCITALOPRAM OXALATE 10 MG/1
10 TABLET ORAL DAILY
Qty: 30 TABLET | Refills: 1 | Status: SHIPPED | OUTPATIENT
Start: 2024-11-19

## 2024-11-19 RX ORDER — ALBUTEROL SULFATE 0.83 MG/ML
2.5 SOLUTION RESPIRATORY (INHALATION)
Status: CANCELLED | OUTPATIENT
Start: 2024-11-19

## 2024-11-19 RX ORDER — DIPHENHYDRAMINE HCL 25 MG
50 CAPSULE ORAL ONCE
Status: COMPLETED | OUTPATIENT
Start: 2024-11-19 | End: 2024-11-19

## 2024-11-19 RX ORDER — PROCHLORPERAZINE MALEATE 10 MG
10 TABLET ORAL EVERY 6 HOURS PRN
Qty: 60 TABLET | Refills: 2 | Status: SHIPPED | OUTPATIENT
Start: 2024-11-19

## 2024-11-19 RX ORDER — PALONOSETRON 0.05 MG/ML
0.25 INJECTION, SOLUTION INTRAVENOUS ONCE
Status: COMPLETED | OUTPATIENT
Start: 2024-11-19 | End: 2024-11-19

## 2024-11-19 RX ORDER — ACETAMINOPHEN 325 MG/1
650 TABLET ORAL ONCE
Status: CANCELLED
Start: 2024-11-19

## 2024-11-19 RX ORDER — LORAZEPAM 2 MG/ML
0.5 INJECTION INTRAMUSCULAR EVERY 4 HOURS PRN
Status: CANCELLED | OUTPATIENT
Start: 2024-11-19

## 2024-11-19 RX ORDER — ALLOPURINOL 300 MG/1
TABLET ORAL
COMMUNITY
Start: 2024-11-12

## 2024-11-19 RX ORDER — DOXORUBICIN HYDROCHLORIDE 2 MG/ML
50 INJECTION, SOLUTION INTRAVENOUS ONCE
Status: CANCELLED | OUTPATIENT
Start: 2024-11-19

## 2024-11-19 RX ORDER — ESCITALOPRAM OXALATE 10 MG/1
10 TABLET ORAL DAILY
Qty: 30 TABLET | Refills: 1 | Status: SHIPPED | OUTPATIENT
Start: 2024-11-19 | End: 2024-11-19

## 2024-11-19 RX ORDER — ALBUTEROL SULFATE 90 UG/1
1-2 INHALANT RESPIRATORY (INHALATION)
Status: CANCELLED
Start: 2024-11-19

## 2024-11-19 RX ORDER — DIPHENHYDRAMINE HCL 25 MG
50 CAPSULE ORAL ONCE
Status: CANCELLED
Start: 2024-11-19

## 2024-11-19 RX ORDER — PROCHLORPERAZINE MALEATE 10 MG
10 TABLET ORAL EVERY 6 HOURS PRN
Qty: 30 TABLET | Refills: 0 | OUTPATIENT
Start: 2024-11-19

## 2024-11-19 RX ORDER — METHYLPREDNISOLONE SODIUM SUCCINATE 40 MG/ML
40 INJECTION INTRAMUSCULAR; INTRAVENOUS
Status: CANCELLED
Start: 2024-11-19

## 2024-11-19 RX ORDER — DOXORUBICIN HYDROCHLORIDE 2 MG/ML
50 INJECTION, SOLUTION INTRAVENOUS ONCE
Status: COMPLETED | OUTPATIENT
Start: 2024-11-19 | End: 2024-11-19

## 2024-11-19 RX ORDER — MEPERIDINE HYDROCHLORIDE 25 MG/ML
25 INJECTION INTRAMUSCULAR; INTRAVENOUS; SUBCUTANEOUS
Status: CANCELLED | OUTPATIENT
Start: 2024-11-19

## 2024-11-19 RX ADMIN — ACETAMINOPHEN 650 MG: 325 TABLET ORAL at 08:54

## 2024-11-19 RX ADMIN — VINCRISTINE SULFATE 2 MG: 1 INJECTION, SOLUTION INTRAVENOUS at 08:48

## 2024-11-19 RX ADMIN — SODIUM CHLORIDE 100 ML: 900 INJECTION INTRAVENOUS at 08:06

## 2024-11-19 RX ADMIN — FOSAPREPITANT 150 MG: 150 INJECTION, POWDER, LYOPHILIZED, FOR SOLUTION INTRAVENOUS at 08:07

## 2024-11-19 RX ADMIN — DOXORUBICIN HYDROCHLORIDE 100 MG: 2 INJECTION, SOLUTION INTRAVENOUS at 08:32

## 2024-11-19 RX ADMIN — DIPHENHYDRAMINE HYDROCHLORIDE 50 MG: 25 CAPSULE ORAL at 08:54

## 2024-11-19 RX ADMIN — RITUXIMAB-ABBS 800 MG: 10 INJECTION, SOLUTION INTRAVENOUS at 09:46

## 2024-11-19 RX ADMIN — CYCLOPHOSPHAMIDE 1500 MG: 1 INJECTION, POWDER, FOR SOLUTION INTRAVENOUS; ORAL at 08:52

## 2024-11-19 RX ADMIN — PALONOSETRON HYDROCHLORIDE 0.25 MG: 0.25 INJECTION INTRAVENOUS at 08:06

## 2024-11-19 ASSESSMENT — PAIN SCALES - GENERAL: PAINLEVEL_OUTOF10: NO PAIN (0)

## 2024-11-19 NOTE — PROGRESS NOTES
Infusion Nursing Note:  Pedrito Jenkins presents today for Cycle 5, day 1 Adriamycin, Vincristine, Cytoxan and Rituximab-abbs.    Patient seen by provider today: Yes: Cherrie Govea NP    Note: Patient presents to clinic today feeling well with no questions.  Pt would like to proceed with therapy today. Pt did not request or require any intervention for pain today.    Intravenous Access:  Peripheral IV placed.    Treatment Conditions:  Lab Results   Component Value Date    HGB 12.1 (L) 11/19/2024    WBC 3.4 (L) 11/19/2024    ANEUTAUTO 2.1 11/19/2024     11/19/2024        Lab Results   Component Value Date     11/19/2024    POTASSIUM 3.8 11/19/2024    CR 0.98 11/19/2024    NEY 9.4 11/19/2024    BILITOTAL 0.2 11/19/2024    ALBUMIN 4.2 11/19/2024    ALT 38 11/19/2024    AST 40 11/19/2024     Results reviewed, labs MET treatment parameters, ok to proceed with treatment.  ECHO/MUGA completed 8/16/24 EF 60-65%.    Post Infusion Assessment:  Patient tolerated infusion without incident.  Blood return noted pre and post infusion.  Blood return noted during Adriamycin administration every 2 cc.  Blood return noted prior to/during/post Vincristine administration.  Site patent and intact, free from redness, edema or discomfort.  No evidence of extravasations.  Access discontinued per protocol.    Discharge Plan:   Prescription refills given for Lexapro, Compazine and Prednisone.  Discharge instructions reviewed with: Patient.  Patient and/or family verbalized understanding of discharge instructions and all questions answered.  AVS to patient via Mindshapes.  Patient will return 12/10/2024 for next appointment.   Patient discharged in stable condition accompanied by: wife.  Departure Mode: Ambulatory.    Cheryl Jones RN

## 2024-11-19 NOTE — LETTER
11/19/2024      Pedrito Jenkins  [address]  [place] [place] [address]      Dear Colleague,    Thank you for referring your patient, Pedrito Jenkins, to the [facility]. Please see a copy of my visit note below.    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      Oncology HPI: Pedrito is a 53 year old man with nodular lymphocyte predominant Hodgkin lymphoma.  Patient had been in his usual state of health until June, 2024 when he developed gradually progressive nausea and mild abdominal discomfort.  He also had some loose stools but not watery diarrhea.  The symptoms led to medical attention.  Initially, an exhaustive workup for gastrointestinal pathology was done; IgA- 37, Vitamin B12-WNL, vitamin D-WNL, ESR-WNL, CRP-WNL, TSH-WNL, issue transglutaminase antibodies- neg, lipase levels-WNL, calprotectin- neg, H. Pylori stool antigen- neg, enteric bacterial panel- positive for enterotoxigenic E. Coli.  He was given an antibiotic for 1 dose and that did not resolve his symptoms.  7/23/24 CT scan was done as well to further investigate patient's nausea and abdominal pain which showed 5 cm x 4.7 cm x 5.3 cm enhancing soft tissue mass in the mesentery and there are scattered mesenteric lymphadenopathy surrounding this lesion and mesenteric stranding and haziness.  He subsequently saw Dr. Werner from general surgery and underwent laparoscopic biopsy of the mass on 8/6/2024.  Patient also had umbilical hernia that was repaired.  Patient recovered from the surgery without any major complications.  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  Subsequent PET scan done on 8/16/24 showed hypermetabolic mesenteric mass correlating with the prior CT scan with SUV max of 11.  There are scattered hypermetabolic lymphadenopathy in the mesentery surrounding the mass as well as  mesenteric stranding. Patient does not have any major cytopenias on peripheral blood work as well as no renal or kidney abnormalities.      He was started on R-CHOP (C1D1=8/28/24).  10/4/24 pet s/p C2 demonstrated  no evidence of disease, metabolic CR.    Interval history:   Pedrito presents to clinic for toxicity check prior to C5D1 R-CHOP.  He is overall doing well.  He has had more nausea this past cycle which he feels has been mild and manageable with anti-emetics.  He feels the Lexapro has been beneficial for  his moods.  His appetite is stable and is eating and drinking well.  Denies fevers, night sweats, diarrhea, constipation, neuropathy, mouth sores, rashes/sores, new pains, new lumps/bumps, all other acute issues or concerns.    Current Outpatient Medications   Medication Sig Dispense Refill     acetaminophen (TYLENOL) 325 MG tablet Take 2 tablets (650 mg) by mouth every 4 hours as needed for mild pain       allopurinol (ZYLOPRIM) 300 MG tablet Take 1 tablet (300 mg) by mouth daily.*       escitalopram (LEXAPRO) 10 MG tablet Take 1 tablet (10 mg) by mouth daily. 30 tablet 1     ondansetron (ZOFRAN) 8 MG tablet Take 1 tablet (8 mg) by mouth every 8 hours as needed for nausea (vomiting). Do not start before August 25, 2024. 30 tablet 2     predniSONE (DELTASONE) 10 MG tablet TAKE 5 TABLETS (50 MG) BY MOUTH 2 TIMES DAILY FOR 5 DAYS. TAKE ON DAYS 1 THROUGH 5. TAKE FIRST DOSE IN AM PRIOR TO CHEMOTHERAPY. DO NOT START BEFORE AUGUST 25, 2024.*       prochlorperazine (COMPAZINE) 10 MG tablet Take 1 tablet (10 mg) by mouth every 6 hours as needed for nausea or vomiting. 60 tablet 2     SENNA-docusate sodium (SENNA S) 8.6-50 MG tablet Take 1 tablet by mouth at bedtime. 30 tablet 1     acyclovir (ZOVIRAX) 400 MG tablet Take 1 tablet (400 mg) by mouth every 12 hours. 60 tablet 11       No Known Allergies      Physical Exam:  /76 (BP Location: Right arm, Patient Position: Sitting, Cuff Size: Adult Regular)    Pulse 61   Temp 98.2  F (36.8  C) (Oral)   Resp 16   Wt 88.4 kg (194 lb 12.8 oz)   SpO2 97%   BMI 28.77 kg/m    General: No acute distress, pleasant, well-groomed   HEENT: EOMI, PERRL. Sclerae are anicteric. Oral exam deferred  Lymph: Neck is supple with no lymphadenopathy in the cervical, supraclavicular, or axillary areas.   Heart: Regular rate and rhythm.   Lungs: Clear to auscultation bilaterally.   Extremities: No lower extremity edema noted bilaterally.   Neuro: Alert and oriented x3, CN grossly intact, steady gait  Skin: No rashes, petechiae, or bruising noted on exposed skin.      Labs:   Most Recent 3 CBC's:  Recent Labs   Lab Test 11/19/24  0700 10/29/24  1054 10/08/24  0639   WBC 3.4* 4.4 3.0*   HGB 12.1* 13.0* 14.1   MCV 89 87 86    252 210   ANEUTAUTO 2.1 2.7 1.6     Most Recent 3 BMP's:  Recent Labs   Lab Test 10/29/24  1054 10/08/24  0639 09/18/24  1000    139 139   POTASSIUM 4.1 4.1 4.1   CHLORIDE 105 104 106   CO2 23 23 22   BUN 13.6 12.8 14.6   CR 1.04 0.98 0.99   ANIONGAP 11 12 11   NEY 9.7 9.9 9.8   GLC 99 115* 110*   PROTTOTAL 6.8 6.6 6.6   ALBUMIN 4.4 4.4 4.4    Most Recent 3 LFT's:  Recent Labs   Lab Test 10/29/24  1054 10/08/24  0639 09/18/24  1000   AST 43 34 27   ALT 47 37 28   ALKPHOS 81 86 86   BILITOTAL 0.4 0.4 0.3    Most Recent 2 TSH and T4:  Recent Labs   Lab Test 06/21/24  0853   TSH 2.17     I reviewed the above labs today.    Impression/plan:     Nodular lymphocyte predominant Hodgkin lymphoma:  - He is in CR without any radiographic evidence of lymphoma.  He continues to tolerate treatment well without significant side-effects/toxicities.   - We will continue with RCHOP for total 6 cycles.   - PET scan after cycle 6 and follow-up with Dr. Lu     - Proceed with C5D1 R-CHOP today (11/19)- he is tolerating treatment well other than some mild nausea    Nausea:  D/t chemo- managed with Compazine PRN    Constipation:  Managing with Senna    Steroid induced insomnia  and irritability:  - Reduce prednisone to 50 mg daily.     Depressed Mood:  Worsening depressed mood and lacking motivation/ambition to do things he used to enjoy.  Denied suicidal or homicidal ideation and discussed options today.  Discussed starting a selective serotonin reuptake inhibitor, Lexapro.He has been hesitant to start treatment due to potential side effects so decided to start low-dose lexapro 5 mg dailyon 10/29 and we can discuss increasing after 2-3 weeks but also discussed full effects can take 4-6 weeks.  He is currently on Lexapro 10 mg daily and has noticed improvement in his moods.  - Consulted with Psych Onc 11/7      The longitudinal plan of care for the diagnosis(es)/condition(s) as documented were addressed during this visit. Due to the added complexity in care, I will continue to support Pedrito in the subsequent management and with ongoing continuity of care.     PARAS Hamilton CNP  [facility] [address] [id number] [phone]      Again, thank you for allowing me to participate in the care of your patient.        Sincerely,        PARAS Hamilton CNP

## 2024-11-19 NOTE — NURSING NOTE
"Oncology Rooming Note    November 19, 2024 7:10 AM   Pedrito Jenkins is a 53 year old male who presents for:    Chief Complaint   Patient presents with    Blood Draw     Labs drawn from PIV placed by RN in lab    Oncology Clinic Visit     Mediastinal mass Hodgkin's lymphoma      Initial Vitals: /76 (BP Location: Right arm, Patient Position: Sitting, Cuff Size: Adult Regular)   Pulse 61   Temp 98.2  F (36.8  C) (Oral)   Resp 16   Wt 88.4 kg (194 lb 12.8 oz)   SpO2 97%   BMI 28.77 kg/m   Estimated body mass index is 28.77 kg/m  as calculated from the following:    Height as of 11/7/24: 1.753 m (5' 9\").    Weight as of this encounter: 88.4 kg (194 lb 12.8 oz). Body surface area is 2.07 meters squared.  No Pain (0) Comment: Data Unavailable   No LMP for male patient.  Allergies reviewed: Yes  Medications reviewed: Yes    Medications: MEDICATION REFILLS NEEDED TODAY. Provider was notified.  Pharmacy name entered into [facility]: [id number] - [place], [place] - [address] [place] [address]    Frailty Screening:   Is the patient here for a new oncology consult visit in cancer care? 2. No      Clinical concerns:  Needs refills on lexapro, allopurinol, and compazine     Amy Verdugo              "

## 2024-11-19 NOTE — NURSING NOTE
Chief Complaint   Patient presents with    Blood Draw     Labs drawn from PIV placed by RN in lab     Labs drawn from PIV placed by RN. Line flushed with saline. Vitals taken. Pt checked in for appointment(s).     Blanca Rizo RN

## 2024-11-19 NOTE — PATIENT INSTRUCTIONS
Contact Numbers    [facility] [place] [facility]: [phone]    Call with chills and/or temperature greater than or equal to 100.5, uncontrolled nausea/vomiting, diarrhea, constipation, dizziness, shortness of breath, chest pain, bleeding, unexplained bruising, or any new/concerning symptoms, questions/concerns.     If you are having any concerning symptoms or wish to speak to a provider before your next infusion visit, please call your care coordinator or triage to notify them so we can adequately serve you.       After Hours: [phone]    If after hours, weekends, or holidays, call main hospital  and ask for Oncology doctor on call.      November 2024 Sunday Monday Tuesday Wednesday Thursday Friday Saturday                            1     2       3     4     5     6     7    NEW ONCOLOGY   1:45 PM   (60 min.)   Narendra Torres LP   [facility] 8     9       10     11     12     13     14     15     16       17     18     19    LAB PERIPHERAL   6:30 AM   (15 min.)   [facility] DRAW   [facility]    RETURN CCSL   6:45 AM   (45 min.)   Cherrie Govea APRN CNP   [facility]    ONC INFUSION 5 HR (300 MIN)   7:30 AM   (300 min.)   [facility] ONC INFUSION NURSE   [facility] 20     21     22     23       24     25     26     27     28     29     30                 December 2024 Sunday Monday Tuesday Wednesday Thursday Friday Saturday   1     2     3     4     5     6     7       8     9     10    LAB PERIPHERAL   8:30 AM   (15 min.)   [facility]   [facility]    RETURN CCSL   8:45 AM   (45 min.)   Cherrie Govea APRN CNP   [facility]    ONC INFUSION 5 HR (300 MIN)  11:30 AM   (300 min.)   [facility] ONC INFUSION NURSE   [facility] 11     12  Happy Birthday!     13     14       15     16     17      18     19     20     21       22     23     24     25     26     27     28       29     30     31                                         Lab Results:  Recent Results (from the past 12 hours)   Comprehensive metabolic panel    Collection Time: 11/19/24  7:00 AM   Result Value Ref Range    Sodium 140 135 - 145 mmol/L    Potassium 3.8 3.4 - 5.3 mmol/L    Carbon Dioxide (CO2) 22 22 - 29 mmol/L    Anion Gap 12 7 - 15 mmol/L    Urea Nitrogen 14.9 6.0 - 20.0 mg/dL    Creatinine 0.98 0.67 - 1.17 mg/dL    GFR Estimate >90 >60 mL/min/1.73m2    Calcium 9.4 8.8 - 10.4 mg/dL    Chloride 106 98 - 107 mmol/L    Glucose 134 (H) 70 - 99 mg/dL    Alkaline Phosphatase 89 40 - 150 U/L    AST 40 0 - 45 U/L    ALT 38 0 - 70 U/L    Protein Total 6.3 (L) 6.4 - 8.3 g/dL    Albumin 4.2 3.5 - 5.2 g/dL    Bilirubin Total 0.2 <=1.2 mg/dL   CBC with platelets and differential    Collection Time: 11/19/24  7:00 AM   Result Value Ref Range    WBC Count 3.4 (L) 4.0 - 11.0 10e3/uL    RBC Count 3.82 (L) 4.40 - 5.90 10e6/uL    Hemoglobin 12.1 (L) 13.3 - 17.7 g/dL    Hematocrit 34.1 (L) 40.0 - 53.0 %    MCV 89 78 - 100 fL    MCH 31.7 26.5 - 33.0 pg    MCHC 35.5 31.5 - 36.5 g/dL    RDW 14.6 10.0 - 15.0 %    Platelet Count 292 150 - 450 10e3/uL    % Neutrophils 62 %    % Lymphocytes 14 %    % Monocytes 19 %    % Eosinophils 2 %    % Basophils 1 %    % Immature Granulocytes 1 %    NRBCs per 100 WBC 0 <1 /100    Absolute Neutrophils 2.1 1.6 - 8.3 10e3/uL    Absolute Lymphocytes 0.5 (L) 0.8 - 5.3 10e3/uL    Absolute Monocytes 0.7 0.0 - 1.3 10e3/uL    Absolute Eosinophils 0.1 0.0 - 0.7 10e3/uL    Absolute Basophils 0.0 0.0 - 0.2 10e3/uL    Absolute Immature Granulocytes 0.0 <=0.4 10e3/uL    Absolute NRBCs 0.0 10e3/uL

## 2024-12-06 NOTE — PROGRESS NOTES
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      Oncology HPI: Pedrito is a 53 year old man with nodular lymphocyte predominant Hodgkin lymphoma.  Patient had been in his usual state of health until June, 2024 when he developed gradually progressive nausea and mild abdominal discomfort.  He also had some loose stools but not watery diarrhea.  The symptoms led to medical attention.  Initially, an exhaustive workup for gastrointestinal pathology was done; IgA- 37, Vitamin B12-WNL, vitamin D-WNL, ESR-WNL, CRP-WNL, TSH-WNL, issue transglutaminase antibodies- neg, lipase levels-WNL, calprotectin- neg, H. Pylori stool antigen- neg, enteric bacterial panel- positive for enterotoxigenic E. Coli.  He was given an antibiotic for 1 dose and that did not resolve his symptoms.  7/23/24 CT scan was done as well to further investigate patient's nausea and abdominal pain which showed 5 cm x 4.7 cm x 5.3 cm enhancing soft tissue mass in the mesentery and there are scattered mesenteric lymphadenopathy surrounding this lesion and mesenteric stranding and haziness.  He subsequently saw Dr. Werner from general surgery and underwent laparoscopic biopsy of the mass on 8/6/2024.  Patient also had umbilical hernia that was repaired.  Patient recovered from the surgery without any major complications.  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  Subsequent PET scan done on 8/16/24 showed hypermetabolic mesenteric mass correlating with the prior CT scan with SUV max of 11.  There are scattered hypermetabolic lymphadenopathy in the mesentery surrounding the mass as well as mesenteric stranding. Patient does not have any major cytopenias on peripheral blood work as well as no renal or kidney abnormalities.      He was started on R-CHOP (C1D1=8/28/24).  10/4/24 pet s/p C2 demonstrated  no evidence of disease,  metabolic CR.    Interval history:   Pedrito presents to clinic for toxicity check prior to C6D1 R-CHOP.   He is doing overall well.  He has had low energy levels and feels that this past cycle hit harder.  Still able to perform ADLs and light housework.  His appetite has been a little low but is still eating 3 meals per day.      He is still having some depressed moods but feels the Lexapro is going well and does not need an increase at this time.    He did have some increased nausea but no vomiting and feels it is manageable with his antiemetics before bed at night.    Denies fevers, night sweats, diarrhea, constipation, neuropathy, mouth sores, rashes/sores, new pains, new lumps/bumps, all other acute issues or concerns.    Current Outpatient Medications   Medication Sig Dispense Refill    allopurinol (ZYLOPRIM) 300 MG tablet Take 1 tablet (300 mg) by mouth daily. 30 tablet 0    escitalopram (LEXAPRO) 10 MG tablet Take 1 tablet (10 mg) by mouth daily. 60 tablet 1    acetaminophen (TYLENOL) 325 MG tablet Take 2 tablets (650 mg) by mouth every 4 hours as needed for mild pain      acyclovir (ZOVIRAX) 400 MG tablet Take 1 tablet (400 mg) by mouth every 12 hours. 60 tablet 11    ondansetron (ZOFRAN) 8 MG tablet Take 1 tablet (8 mg) by mouth every 8 hours as needed for nausea (vomiting). Do not start before August 25, 2024. 30 tablet 2    predniSONE (DELTASONE) 50 MG tablet Take 1 tablet (50 mg) by mouth daily. 5 tablet 1    prochlorperazine (COMPAZINE) 10 MG tablet Take 1 tablet (10 mg) by mouth every 6 hours as needed for nausea or vomiting. 60 tablet 2    SENNA-docusate sodium (SENNA S) 8.6-50 MG tablet Take 1 tablet by mouth at bedtime. 30 tablet 1       No Known Allergies      Physical Exam:  /75 (BP Location: Left arm, Patient Position: Sitting, Cuff Size: Adult Large)   Pulse 60   Temp 98.2  F (36.8  C) (Oral)   Resp 18   Wt 87 kg (191 lb 12.8 oz)   SpO2 97%   BMI 28.32 kg/m    General: No acute  distress, pleasant, well-groomed    HEENT: EOMI, PERRL. Sclerae are anicteric. Oral exam deferred  Lymph: Neck is supple with no lymphadenopathy in the cervical, supraclavicular, or axillary areas.   Heart: Regular rate and rhythm.   Lungs: Clear to auscultation bilaterally.   Extremities: No lower extremity edema noted bilaterally.   Neuro: Alert and oriented x3, CN grossly intact, steady gait  Skin: No rashes, petechiae, or bruising noted on exposed skin.    Labs:   Most Recent 3 CBC's:  Recent Labs   Lab Test 12/10/24  0923 11/19/24  0700 10/29/24  1054   WBC 2.3* 3.4* 4.4   HGB 12.0* 12.1* 13.0*   MCV 90 89 87    292 252   ANEUTAUTO 1.3* 2.1 2.7     Most Recent 3 BMP's:  Recent Labs   Lab Test 12/10/24  0923 11/19/24  0700 10/29/24  1054    140 139   POTASSIUM 4.1 3.8 4.1   CHLORIDE 104 106 105   CO2 24 22 23   BUN 17.5 14.9 13.6   CR 0.95 0.98 1.04   ANIONGAP 10 12 11   NEY 9.4 9.4 9.7   * 134* 99   PROTTOTAL 6.4 6.3* 6.8   ALBUMIN 4.3 4.2 4.4    Most Recent 3 LFT's:  Recent Labs   Lab Test 12/10/24  0923 11/19/24  0700 10/29/24  1054   AST 47* 40 43   ALT 49 38 47   ALKPHOS 82 89 81   BILITOTAL 0.3 0.2 0.4    Most Recent 2 TSH and T4:  Recent Labs   Lab Test 06/21/24  0853   TSH 2.17     I reviewed the above labs today.    Impression/plan:     Nodular lymphocyte predominant Hodgkin lymphoma:  - He is in CR without any radiographic evidence of lymphoma.  He continues to tolerate treatment well without significant side-effects/toxicities.   - Proceed with C6D1 R-CHOP today (12/9)- he is tolerating treatment well other than some mild nausea and fatigue.  - PET scan 1/10 and follow-up with Dr. Lu 1/13    Nausea:  D/t chemo- managed with Compazine PRN    Constipation:  Managing with Senna    Steroid induced insomnia and irritability:  - Reduce prednisone to 50 mg daily.     Depressed Mood:  Worsening depressed mood and lacking motivation/ambition to do things he used to enjoy.  Denied  suicidal or homicidal ideation and discussed options today.  Discussed starting a selective serotonin reuptake inhibitor, Lexapro.He has been hesitant to start treatment due to potential side effects so decided to start low-dose lexapro 5 mg dailyon 10/29 and we can discuss increasing after 2-3 weeks but also discussed full effects can take 4-6 weeks.  He is currently on Lexapro 10 mg daily and has noticed improvement in his moods. Would like to continue at current dose of 10 mg daily.  - Consulted with Psych Onc 11/7    The longitudinal plan of care for the diagnosis(es)/condition(s) as documented were addressed during this visit. Due to the added complexity in care, I will continue to support Pedrito in the subsequent management and with ongoing continuity of care.     PARAS Hamilton [facility] [address] [id number] [phone]

## 2024-12-10 ENCOUNTER — ONCOLOGY VISIT (OUTPATIENT)
Dept: ONCOLOGY | Facility: CLINIC | Age: 54
End: 2024-12-10
Attending: STUDENT IN AN ORGANIZED HEALTH CARE EDUCATION/TRAINING PROGRAM
Payer: COMMERCIAL

## 2024-12-10 ENCOUNTER — INFUSION THERAPY VISIT (OUTPATIENT)
Dept: ONCOLOGY | Facility: CLINIC | Age: 54
End: 2024-12-10
Payer: COMMERCIAL

## 2024-12-10 VITALS
BODY MASS INDEX: 28.32 KG/M2 | OXYGEN SATURATION: 97 % | SYSTOLIC BLOOD PRESSURE: 110 MMHG | DIASTOLIC BLOOD PRESSURE: 75 MMHG | RESPIRATION RATE: 18 BRPM | HEART RATE: 60 BPM | WEIGHT: 191.8 LBS | TEMPERATURE: 98.2 F

## 2024-12-10 DIAGNOSIS — R11.0 NAUSEA: ICD-10-CM

## 2024-12-10 DIAGNOSIS — C81.03 NODULAR LYMPHOCYTE PREDOMINANT HODGKIN LYMPHOMA OF INTRA-ABDOMINAL LYMPH NODES (H): Primary | ICD-10-CM

## 2024-12-10 DIAGNOSIS — F43.21 ADJUSTMENT DISORDER WITH DEPRESSED MOOD: ICD-10-CM

## 2024-12-10 DIAGNOSIS — K59.03 DRUG-INDUCED CONSTIPATION: ICD-10-CM

## 2024-12-10 LAB
ALBUMIN SERPL BCG-MCNC: 4.3 G/DL (ref 3.5–5.2)
ALP SERPL-CCNC: 82 U/L (ref 40–150)
ALT SERPL W P-5'-P-CCNC: 49 U/L (ref 0–70)
ANION GAP SERPL CALCULATED.3IONS-SCNC: 10 MMOL/L (ref 7–15)
AST SERPL W P-5'-P-CCNC: 47 U/L (ref 0–45)
BASOPHILS # BLD AUTO: 0 10E3/UL (ref 0–0.2)
BASOPHILS NFR BLD AUTO: 1 %
BILIRUB SERPL-MCNC: 0.3 MG/DL
BUN SERPL-MCNC: 17.5 MG/DL (ref 6–20)
CALCIUM SERPL-MCNC: 9.4 MG/DL (ref 8.8–10.4)
CHLORIDE SERPL-SCNC: 104 MMOL/L (ref 98–107)
CREAT SERPL-MCNC: 0.95 MG/DL (ref 0.67–1.17)
EGFRCR SERPLBLD CKD-EPI 2021: >90 ML/MIN/1.73M2
EOSINOPHIL # BLD AUTO: 0 10E3/UL (ref 0–0.7)
EOSINOPHIL NFR BLD AUTO: 1 %
ERYTHROCYTE [DISTWIDTH] IN BLOOD BY AUTOMATED COUNT: 14.2 % (ref 10–15)
GLUCOSE SERPL-MCNC: 112 MG/DL (ref 70–99)
HCO3 SERPL-SCNC: 24 MMOL/L (ref 22–29)
HCT VFR BLD AUTO: 33.3 % (ref 40–53)
HGB BLD-MCNC: 12 G/DL (ref 13.3–17.7)
IMM GRANULOCYTES # BLD: 0 10E3/UL
IMM GRANULOCYTES NFR BLD: 1 %
LYMPHOCYTES # BLD AUTO: 0.3 10E3/UL (ref 0.8–5.3)
LYMPHOCYTES NFR BLD AUTO: 14 %
MCH RBC QN AUTO: 32.3 PG (ref 26.5–33)
MCHC RBC AUTO-ENTMCNC: 36 G/DL (ref 31.5–36.5)
MCV RBC AUTO: 90 FL (ref 78–100)
MONOCYTES # BLD AUTO: 0.5 10E3/UL (ref 0–1.3)
MONOCYTES NFR BLD AUTO: 24 %
NEUTROPHILS # BLD AUTO: 1.3 10E3/UL (ref 1.6–8.3)
NEUTROPHILS NFR BLD AUTO: 59 %
NRBC # BLD AUTO: 0 10E3/UL
NRBC BLD AUTO-RTO: 0 /100
PLATELET # BLD AUTO: 236 10E3/UL (ref 150–450)
POTASSIUM SERPL-SCNC: 4.1 MMOL/L (ref 3.4–5.3)
PROT SERPL-MCNC: 6.4 G/DL (ref 6.4–8.3)
RBC # BLD AUTO: 3.72 10E6/UL (ref 4.4–5.9)
SODIUM SERPL-SCNC: 138 MMOL/L (ref 135–145)
WBC # BLD AUTO: 2.3 10E3/UL (ref 4–11)

## 2024-12-10 PROCEDURE — 96413 CHEMO IV INFUSION 1 HR: CPT

## 2024-12-10 PROCEDURE — 99213 OFFICE O/P EST LOW 20 MIN: CPT

## 2024-12-10 PROCEDURE — 85004 AUTOMATED DIFF WBC COUNT: CPT

## 2024-12-10 PROCEDURE — 99215 OFFICE O/P EST HI 40 MIN: CPT

## 2024-12-10 PROCEDURE — 96367 TX/PROPH/DG ADDL SEQ IV INF: CPT

## 2024-12-10 PROCEDURE — G2211 COMPLEX E/M VISIT ADD ON: HCPCS

## 2024-12-10 PROCEDURE — 82247 BILIRUBIN TOTAL: CPT

## 2024-12-10 PROCEDURE — 80053 COMPREHEN METABOLIC PANEL: CPT

## 2024-12-10 PROCEDURE — 96411 CHEMO IV PUSH ADDL DRUG: CPT

## 2024-12-10 PROCEDURE — 250N000011 HC RX IP 250 OP 636

## 2024-12-10 PROCEDURE — 82310 ASSAY OF CALCIUM: CPT

## 2024-12-10 PROCEDURE — 258N000003 HC RX IP 258 OP 636

## 2024-12-10 PROCEDURE — 250N000013 HC RX MED GY IP 250 OP 250 PS 637

## 2024-12-10 PROCEDURE — 96375 TX/PRO/DX INJ NEW DRUG ADDON: CPT

## 2024-12-10 PROCEDURE — 96417 CHEMO IV INFUS EACH ADDL SEQ: CPT

## 2024-12-10 PROCEDURE — 85018 HEMOGLOBIN: CPT

## 2024-12-10 PROCEDURE — 96415 CHEMO IV INFUSION ADDL HR: CPT

## 2024-12-10 PROCEDURE — 36415 COLL VENOUS BLD VENIPUNCTURE: CPT

## 2024-12-10 PROCEDURE — 82040 ASSAY OF SERUM ALBUMIN: CPT

## 2024-12-10 RX ORDER — METHYLPREDNISOLONE SODIUM SUCCINATE 40 MG/ML
40 INJECTION INTRAMUSCULAR; INTRAVENOUS
Status: CANCELLED
Start: 2024-12-10

## 2024-12-10 RX ORDER — DOXORUBICIN HYDROCHLORIDE 2 MG/ML
50 INJECTION, SOLUTION INTRAVENOUS ONCE
Status: COMPLETED | OUTPATIENT
Start: 2024-12-10 | End: 2024-12-10

## 2024-12-10 RX ORDER — DIPHENHYDRAMINE HCL 25 MG
50 CAPSULE ORAL ONCE
Status: COMPLETED | OUTPATIENT
Start: 2024-12-10 | End: 2024-12-10

## 2024-12-10 RX ORDER — ALBUTEROL SULFATE 0.83 MG/ML
2.5 SOLUTION RESPIRATORY (INHALATION)
Status: CANCELLED | OUTPATIENT
Start: 2024-12-10

## 2024-12-10 RX ORDER — MEPERIDINE HYDROCHLORIDE 25 MG/ML
25 INJECTION INTRAMUSCULAR; INTRAVENOUS; SUBCUTANEOUS
Status: CANCELLED | OUTPATIENT
Start: 2024-12-10

## 2024-12-10 RX ORDER — PALONOSETRON 0.05 MG/ML
0.25 INJECTION, SOLUTION INTRAVENOUS ONCE
Status: CANCELLED
Start: 2024-12-10

## 2024-12-10 RX ORDER — ACETAMINOPHEN 325 MG/1
650 TABLET ORAL ONCE
Status: CANCELLED
Start: 2024-12-10

## 2024-12-10 RX ORDER — ESCITALOPRAM OXALATE 10 MG/1
10 TABLET ORAL DAILY
Qty: 60 TABLET | Refills: 1 | Status: SHIPPED | OUTPATIENT
Start: 2024-12-10

## 2024-12-10 RX ORDER — HEPARIN SODIUM,PORCINE 10 UNIT/ML
5-20 VIAL (ML) INTRAVENOUS DAILY PRN
Status: CANCELLED | OUTPATIENT
Start: 2024-12-10

## 2024-12-10 RX ORDER — EPINEPHRINE 1 MG/ML
0.3 INJECTION, SOLUTION INTRAMUSCULAR; SUBCUTANEOUS EVERY 5 MIN PRN
Status: CANCELLED | OUTPATIENT
Start: 2024-12-10

## 2024-12-10 RX ORDER — PALONOSETRON 0.05 MG/ML
0.25 INJECTION, SOLUTION INTRAVENOUS ONCE
Status: COMPLETED | OUTPATIENT
Start: 2024-12-10 | End: 2024-12-10

## 2024-12-10 RX ORDER — ALLOPURINOL 300 MG/1
300 TABLET ORAL DAILY
Qty: 30 TABLET | Refills: 0 | Status: SHIPPED | OUTPATIENT
Start: 2024-12-10

## 2024-12-10 RX ORDER — DIPHENHYDRAMINE HCL 25 MG
50 CAPSULE ORAL ONCE
Status: CANCELLED
Start: 2024-12-10

## 2024-12-10 RX ORDER — DIPHENHYDRAMINE HYDROCHLORIDE 50 MG/ML
50 INJECTION INTRAMUSCULAR; INTRAVENOUS ONCE
Status: CANCELLED
Start: 2024-12-10

## 2024-12-10 RX ORDER — DOXORUBICIN HYDROCHLORIDE 2 MG/ML
50 INJECTION, SOLUTION INTRAVENOUS ONCE
Status: CANCELLED | OUTPATIENT
Start: 2024-12-10

## 2024-12-10 RX ORDER — ALBUTEROL SULFATE 90 UG/1
1-2 INHALANT RESPIRATORY (INHALATION)
Status: CANCELLED
Start: 2024-12-10

## 2024-12-10 RX ORDER — DIPHENHYDRAMINE HYDROCHLORIDE 50 MG/ML
25 INJECTION INTRAMUSCULAR; INTRAVENOUS
Status: CANCELLED
Start: 2024-12-10

## 2024-12-10 RX ORDER — METHYLPREDNISOLONE SODIUM SUCCINATE 125 MG/2ML
125 INJECTION INTRAMUSCULAR; INTRAVENOUS
Status: CANCELLED
Start: 2024-12-10

## 2024-12-10 RX ORDER — LORAZEPAM 2 MG/ML
0.5 INJECTION INTRAMUSCULAR EVERY 4 HOURS PRN
Status: CANCELLED | OUTPATIENT
Start: 2024-12-10

## 2024-12-10 RX ORDER — HEPARIN SODIUM (PORCINE) LOCK FLUSH IV SOLN 100 UNIT/ML 100 UNIT/ML
5 SOLUTION INTRAVENOUS
Status: CANCELLED | OUTPATIENT
Start: 2024-12-10

## 2024-12-10 RX ORDER — DIPHENHYDRAMINE HYDROCHLORIDE 50 MG/ML
50 INJECTION INTRAMUSCULAR; INTRAVENOUS
Status: CANCELLED
Start: 2024-12-10

## 2024-12-10 RX ORDER — ACETAMINOPHEN 325 MG/1
650 TABLET ORAL ONCE
Status: COMPLETED | OUTPATIENT
Start: 2024-12-10 | End: 2024-12-10

## 2024-12-10 RX ORDER — MEPERIDINE HYDROCHLORIDE 25 MG/ML
25 INJECTION INTRAMUSCULAR; INTRAVENOUS; SUBCUTANEOUS
Status: CANCELLED
Start: 2024-12-10

## 2024-12-10 RX ADMIN — VINCRISTINE SULFATE 2 MG: 1 INJECTION, SOLUTION INTRAVENOUS at 10:52

## 2024-12-10 RX ADMIN — DIPHENHYDRAMINE HYDROCHLORIDE 50 MG: 25 CAPSULE ORAL at 11:07

## 2024-12-10 RX ADMIN — DOXORUBICIN HYDROCHLORIDE 100 MG: 2 INJECTION, SOLUTION INTRAVENOUS at 10:41

## 2024-12-10 RX ADMIN — RITUXIMAB-ABBS 800 MG: 10 INJECTION, SOLUTION INTRAVENOUS at 11:54

## 2024-12-10 RX ADMIN — FOSAPREPITANT 150 MG: 150 INJECTION, POWDER, LYOPHILIZED, FOR SOLUTION INTRAVENOUS at 10:14

## 2024-12-10 RX ADMIN — ACETAMINOPHEN 650 MG: 325 TABLET ORAL at 11:07

## 2024-12-10 RX ADMIN — SODIUM CHLORIDE 250 ML: 9 INJECTION, SOLUTION INTRAVENOUS at 10:37

## 2024-12-10 RX ADMIN — PALONOSETRON HYDROCHLORIDE 0.25 MG: 0.25 INJECTION INTRAVENOUS at 10:15

## 2024-12-10 RX ADMIN — CYCLOPHOSPHAMIDE 1500 MG: 1 INJECTION, POWDER, FOR SOLUTION INTRAVENOUS; ORAL at 11:00

## 2024-12-10 ASSESSMENT — PAIN SCALES - GENERAL: PAINLEVEL_OUTOF10: SEVERE PAIN (6)

## 2024-12-10 NOTE — PROGRESS NOTES
Infusion Nursing Note:  Pedrito Jenkins presents today for C6D1 Adriamycin, Vincristine, Cytoxan, Rituximab-abbs.    Patient seen by provider today: Yes: Cherrie Govea NP   present during visit today: Not Applicable.    Note: Pedrito presents to infusion today following his clinic visit. He denies any new concerns.    Intravenous Access:  Peripheral IV placed.    Treatment Conditions:  Lab Results   Component Value Date    HGB 12.0 (L) 12/10/2024    WBC 2.3 (L) 12/10/2024    ANEUTAUTO 1.3 (L) 12/10/2024     12/10/2024     Lab Results   Component Value Date     12/10/2024    POTASSIUM 4.1 12/10/2024    CR 0.95 12/10/2024    NEY 9.4 12/10/2024    BILITOTAL 0.3 12/10/2024    ALBUMIN 4.3 12/10/2024    ALT 49 12/10/2024    AST 47 (H) 12/10/2024     Results reviewed, labs MET treatment parameters, ok to proceed with treatment.  ECHO/MUGA completed 8/16/24  EF 60-65%.    Post Infusion Assessment:  Patient tolerated infusion without incident.  Blood return noted pre and post infusion.  Blood return noted during Adriamycin and Vincristine per protocol.  Site patent and intact, free from redness, edema or discomfort.  No evidence of extravasations.  Access discontinued per protocol.     Discharge Plan:   Prescription refills given for allopurinol and lexapro.  Discharge instructions reviewed with: Patient and Family.  Patient and/or family verbalized understanding of discharge instructions and all questions answered.  AVS to patient via [employer].  Patient will return 1/13/25 for next appointment.   Patient discharged in stable condition accompanied by: wife.  Departure Mode: Ambulatory.      Izabella Blank RN

## 2024-12-10 NOTE — NURSING NOTE
"Oncology Rooming Note    December 10, 2024 9:33 AM   Pedrito Jenkins is a 53 year old male who presents for:    Chief Complaint   Patient presents with    Blood Draw     Labs drawn via PIV by RN in lab, vitals taken.     Oncology Clinic Visit     Hodgkin Lymphoma     Initial Vitals: /75 (BP Location: Left arm, Patient Position: Sitting, Cuff Size: Adult Large)   Pulse 60   Temp 98.2  F (36.8  C) (Oral)   Resp 18   Wt 87 kg (191 lb 12.8 oz)   SpO2 97%   BMI 28.32 kg/m   Estimated body mass index is 28.32 kg/m  as calculated from the following:    Height as of 11/7/24: 1.753 m (5' 9\").    Weight as of this encounter: 87 kg (191 lb 12.8 oz). Body surface area is 2.06 meters squared.  Severe Pain (6) Comment: Data Unavailable   No LMP for male patient.  Allergies reviewed: Yes  Medications reviewed: Yes    Medications: MEDICATION REFILLS NEEDED TODAY. Provider was notified.  Pharmacy name entered into [facility]: [id number] - [place], [place] - [address] [place] [address]    Frailty Screening:   Is the patient here for a new oncology consult visit in cancer care? 2. No      Clinical concerns: None       Qi Rae LPN  12/10/2024              "

## 2024-12-10 NOTE — LETTER
12/10/2024      Pedrito Jenkins  [address]  [place] [address]      Dear Colleague,    Thank you for referring your patient, Pedrito Jenkins, to the [facility]. Please see a copy of my visit note below.    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      Oncology HPI: Pedrito is a 53 year old man with nodular lymphocyte predominant Hodgkin lymphoma.  Patient had been in his usual state of health until June, 2024 when he developed gradually progressive nausea and mild abdominal discomfort.  He also had some loose stools but not watery diarrhea.  The symptoms led to medical attention.  Initially, an exhaustive workup for gastrointestinal pathology was done; IgA- 37, Vitamin B12-WNL, vitamin D-WNL, ESR-WNL, CRP-WNL, TSH-WNL, issue transglutaminase antibodies- neg, lipase levels-WNL, calprotectin- neg, H. Pylori stool antigen- neg, enteric bacterial panel- positive for enterotoxigenic E. Coli.  He was given an antibiotic for 1 dose and that did not resolve his symptoms.  7/23/24 CT scan was done as well to further investigate patient's nausea and abdominal pain which showed 5 cm x 4.7 cm x 5.3 cm enhancing soft tissue mass in the mesentery and there are scattered mesenteric lymphadenopathy surrounding this lesion and mesenteric stranding and haziness.  He subsequently saw Dr. Werner from general surgery and underwent laparoscopic biopsy of the mass on 8/6/2024.  Patient also had umbilical hernia that was repaired.  Patient recovered from the surgery without any major complications.  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  Subsequent PET scan done on 8/16/24 showed hypermetabolic mesenteric mass correlating with the prior CT scan with SUV max of 11.  There are scattered hypermetabolic lymphadenopathy in the mesentery surrounding the mass as well as  mesenteric stranding. Patient does not have any major cytopenias on peripheral blood work as well as no renal or kidney abnormalities.      He was started on R-CHOP (C1D1=8/28/24).  10/4/24 pet s/p C2 demonstrated  no evidence of disease, metabolic CR.    Interval history:   Pedrito presents to clinic for toxicity check prior to C6D1 R-CHOP.   He is doing overall well.  He has had low energy levels and feels that this past cycle hit harder.  Still able to perform ADLs and light housework.  His appetite has been a little low but is still eating 3 meals per day.      He is still having some depressed moods but feels the Lexapro is going well and does not need an increase at this time.    He did have some increased nausea but no vomiting and feels it is manageable with his antiemetics before bed at night.    Denies fevers, night sweats, diarrhea, constipation, neuropathy, mouth sores, rashes/sores, new pains, new lumps/bumps, all other acute issues or concerns.    Current Outpatient Medications   Medication Sig Dispense Refill     allopurinol (ZYLOPRIM) 300 MG tablet Take 1 tablet (300 mg) by mouth daily. 30 tablet 0     escitalopram (LEXAPRO) 10 MG tablet Take 1 tablet (10 mg) by mouth daily. 60 tablet 1     acetaminophen (TYLENOL) 325 MG tablet Take 2 tablets (650 mg) by mouth every 4 hours as needed for mild pain       acyclovir (ZOVIRAX) 400 MG tablet Take 1 tablet (400 mg) by mouth every 12 hours. 60 tablet 11     ondansetron (ZOFRAN) 8 MG tablet Take 1 tablet (8 mg) by mouth every 8 hours as needed for nausea (vomiting). Do not start before August 25, 2024. 30 tablet 2     predniSONE (DELTASONE) 50 MG tablet Take 1 tablet (50 mg) by mouth daily. 5 tablet 1     prochlorperazine (COMPAZINE) 10 MG tablet Take 1 tablet (10 mg) by mouth every 6 hours as needed for nausea or vomiting. 60 tablet 2     SENNA-docusate sodium (SENNA S) 8.6-50 MG tablet Take 1 tablet by mouth at bedtime. 30 tablet 1       No Known  Allergies      Physical Exam:  /75 (BP Location: Left arm, Patient Position: Sitting, Cuff Size: Adult Large)   Pulse 60   Temp 98.2  F (36.8  C) (Oral)   Resp 18   Wt 87 kg (191 lb 12.8 oz)   SpO2 97%   BMI 28.32 kg/m    General: No acute distress, pleasant, well-groomed    HEENT: EOMI, PERRL. Sclerae are anicteric. Oral exam deferred  Lymph: Neck is supple with no lymphadenopathy in the cervical, supraclavicular, or axillary areas.   Heart: Regular rate and rhythm.   Lungs: Clear to auscultation bilaterally.   Extremities: No lower extremity edema noted bilaterally.   Neuro: Alert and oriented x3, CN grossly intact, steady gait  Skin: No rashes, petechiae, or bruising noted on exposed skin.    Labs:   Most Recent 3 CBC's:  Recent Labs   Lab Test 12/10/24  0923 11/19/24  0700 10/29/24  1054   WBC 2.3* 3.4* 4.4   HGB 12.0* 12.1* 13.0*   MCV 90 89 87    292 252   ANEUTAUTO 1.3* 2.1 2.7     Most Recent 3 BMP's:  Recent Labs   Lab Test 12/10/24  0923 11/19/24  0700 10/29/24  1054    140 139   POTASSIUM 4.1 3.8 4.1   CHLORIDE 104 106 105   CO2 24 22 23   BUN 17.5 14.9 13.6   CR 0.95 0.98 1.04   ANIONGAP 10 12 11   NEY 9.4 9.4 9.7   * 134* 99   PROTTOTAL 6.4 6.3* 6.8   ALBUMIN 4.3 4.2 4.4    Most Recent 3 LFT's:  Recent Labs   Lab Test 12/10/24  0923 11/19/24  0700 10/29/24  1054   AST 47* 40 43   ALT 49 38 47   ALKPHOS 82 89 81   BILITOTAL 0.3 0.2 0.4    Most Recent 2 TSH and T4:  Recent Labs   Lab Test 06/21/24  0853   TSH 2.17     I reviewed the above labs today.    Impression/plan:     Nodular lymphocyte predominant Hodgkin lymphoma:  - He is in CR without any radiographic evidence of lymphoma.  He continues to tolerate treatment well without significant side-effects/toxicities.   - Proceed with C6D1 R-CHOP today (12/9)- he is tolerating treatment well other than some mild nausea and fatigue.  - PET scan 1/10 and follow-up with Dr. Lu 1/13    Nausea:  D/t chemo- managed with  Compazine PRN    Constipation:  Managing with Senna    Steroid induced insomnia and irritability:  - Reduce prednisone to 50 mg daily.     Depressed Mood:  Worsening depressed mood and lacking motivation/ambition to do things he used to enjoy.  Denied suicidal or homicidal ideation and discussed options today.  Discussed starting a selective serotonin reuptake inhibitor, Lexapro.He has been hesitant to start treatment due to potential side effects so decided to start low-dose lexapro 5 mg dailyon 10/29 and we can discuss increasing after 2-3 weeks but also discussed full effects can take 4-6 weeks.  He is currently on Lexapro 10 mg daily and has noticed improvement in his moods. Would like to continue at current dose of 10 mg daily.  - Consulted with Psych Onc 11/7    The longitudinal plan of care for the diagnosis(es)/condition(s) as documented were addressed during this visit. Due to the added complexity in care, I will continue to support Pedrito in the subsequent management and with ongoing continuity of care.     PARAS Hamilton CNP  [facility] [address] [id number] [phone]      Again, thank you for allowing me to participate in the care of your patient.        Sincerely,        PARAS Hamilton CNP

## 2024-12-10 NOTE — NURSING NOTE
Chief Complaint   Patient presents with    Blood Draw     Labs drawn via PIV by RN in lab, vitals taken.      Labs drawn from PIV placed by RN. Line flushed with saline. Vitals taken. Pt checked in for appointment(s).    Ronit Teague RN

## 2024-12-30 ENCOUNTER — MYC MEDICAL ADVICE (OUTPATIENT)
Dept: ONCOLOGY | Facility: CLINIC | Age: 54
End: 2024-12-30
Payer: COMMERCIAL

## 2024-12-30 DIAGNOSIS — C81.03 NODULAR LYMPHOCYTE PREDOMINANT HODGKIN LYMPHOMA OF INTRA-ABDOMINAL LYMPH NODES (H): ICD-10-CM

## 2024-12-30 RX ORDER — ACYCLOVIR 400 MG/1
400 TABLET ORAL EVERY 12 HOURS
Qty: 60 TABLET | Refills: 11 | Status: SHIPPED | OUTPATIENT
Start: 2024-12-30

## 2024-12-30 NOTE — TELEPHONE ENCOUNTER
Acyclovir 400mg tab  Last prescribing provider: Dr. Lalito Lu    Last clinic visit date: 12/10/24 w/ Cherrie Govea    Recommendations for requested medication (if none, N/A): NA    Any other pertinent information (if none, N/A): previous script was written with 11 refills, but has end date. Pt wondering if he should continue to take this? Has enough medicine through 1/1/25.   Routing to Dr. Lu, as Cherrie is out of office today.     Refilled: Y/N, if NO, why?

## 2025-01-10 ENCOUNTER — ANCILLARY PROCEDURE (OUTPATIENT)
Dept: PET IMAGING | Facility: CLINIC | Age: 55
End: 2025-01-10
Payer: COMMERCIAL

## 2025-01-10 DIAGNOSIS — C81.03 NODULAR LYMPHOCYTE PREDOMINANT HODGKIN LYMPHOMA OF INTRA-ABDOMINAL LYMPH NODES (H): ICD-10-CM

## 2025-01-10 PROCEDURE — 70491 CT SOFT TISSUE NECK W/DYE: CPT | Mod: GC | Performed by: RADIOLOGY

## 2025-01-10 PROCEDURE — 74177 CT ABD & PELVIS W/CONTRAST: CPT | Mod: GC | Performed by: RADIOLOGY

## 2025-01-10 PROCEDURE — A9552 F18 FDG: HCPCS | Performed by: RADIOLOGY

## 2025-01-10 PROCEDURE — 78816 PET IMAGE W/CT FULL BODY: CPT | Mod: GC | Performed by: RADIOLOGY

## 2025-01-10 PROCEDURE — 71260 CT THORAX DX C+: CPT | Mod: GC | Performed by: RADIOLOGY

## 2025-01-10 RX ORDER — FLUDEOXYGLUCOSE F 18 200 MCI/ML
10-18 INJECTION, SOLUTION INTRAVENOUS ONCE
Status: COMPLETED | OUTPATIENT
Start: 2025-01-10 | End: 2025-01-10

## 2025-01-10 RX ORDER — IOPAMIDOL 755 MG/ML
10-135 INJECTION, SOLUTION INTRAVASCULAR ONCE
Status: COMPLETED | OUTPATIENT
Start: 2025-01-10 | End: 2025-01-10

## 2025-01-10 RX ADMIN — IOPAMIDOL 118 ML: 755 INJECTION, SOLUTION INTRAVASCULAR at 08:00

## 2025-01-10 RX ADMIN — FLUDEOXYGLUCOSE F 18 12.31 MILLICURIE: 200 INJECTION, SOLUTION INTRAVENOUS at 08:01

## 2025-01-13 ENCOUNTER — ONCOLOGY VISIT (OUTPATIENT)
Dept: ONCOLOGY | Facility: CLINIC | Age: 55
End: 2025-01-13
Attending: STUDENT IN AN ORGANIZED HEALTH CARE EDUCATION/TRAINING PROGRAM
Payer: COMMERCIAL

## 2025-01-13 VITALS
OXYGEN SATURATION: 98 % | BODY MASS INDEX: 28.65 KG/M2 | DIASTOLIC BLOOD PRESSURE: 86 MMHG | TEMPERATURE: 98.3 F | RESPIRATION RATE: 16 BRPM | HEART RATE: 57 BPM | WEIGHT: 194 LBS | SYSTOLIC BLOOD PRESSURE: 135 MMHG

## 2025-01-13 DIAGNOSIS — C81.03 NODULAR LYMPHOCYTE PREDOMINANT HODGKIN LYMPHOMA OF INTRA-ABDOMINAL LYMPH NODES (H): ICD-10-CM

## 2025-01-13 LAB
ALBUMIN SERPL BCG-MCNC: 4.5 G/DL (ref 3.5–5.2)
ALP SERPL-CCNC: 91 U/L (ref 40–150)
ALT SERPL W P-5'-P-CCNC: 51 U/L (ref 0–70)
ANION GAP SERPL CALCULATED.3IONS-SCNC: 13 MMOL/L (ref 7–15)
AST SERPL W P-5'-P-CCNC: 37 U/L (ref 0–45)
BASOPHILS # BLD AUTO: 0 10E3/UL (ref 0–0.2)
BASOPHILS NFR BLD AUTO: 1 %
BILIRUB SERPL-MCNC: 0.3 MG/DL
BUN SERPL-MCNC: 14.3 MG/DL (ref 6–20)
CALCIUM SERPL-MCNC: 9.5 MG/DL (ref 8.8–10.4)
CHLORIDE SERPL-SCNC: 104 MMOL/L (ref 98–107)
CREAT SERPL-MCNC: 0.94 MG/DL (ref 0.67–1.17)
EGFRCR SERPLBLD CKD-EPI 2021: >90 ML/MIN/1.73M2
EOSINOPHIL # BLD AUTO: 0.1 10E3/UL (ref 0–0.7)
EOSINOPHIL NFR BLD AUTO: 2 %
ERYTHROCYTE [DISTWIDTH] IN BLOOD BY AUTOMATED COUNT: 13.6 % (ref 10–15)
GLUCOSE SERPL-MCNC: 104 MG/DL (ref 70–99)
HCO3 SERPL-SCNC: 23 MMOL/L (ref 22–29)
HCT VFR BLD AUTO: 34.9 % (ref 40–53)
HGB BLD-MCNC: 12.6 G/DL (ref 13.3–17.7)
IMM GRANULOCYTES # BLD: 0 10E3/UL
IMM GRANULOCYTES NFR BLD: 0 %
LYMPHOCYTES # BLD AUTO: 0.6 10E3/UL (ref 0.8–5.3)
LYMPHOCYTES NFR BLD AUTO: 14 %
MCH RBC QN AUTO: 32.7 PG (ref 26.5–33)
MCHC RBC AUTO-ENTMCNC: 36.1 G/DL (ref 31.5–36.5)
MCV RBC AUTO: 91 FL (ref 78–100)
MONOCYTES # BLD AUTO: 0.8 10E3/UL (ref 0–1.3)
MONOCYTES NFR BLD AUTO: 18 %
NEUTROPHILS # BLD AUTO: 3 10E3/UL (ref 1.6–8.3)
NEUTROPHILS NFR BLD AUTO: 65 %
NRBC # BLD AUTO: 0 10E3/UL
NRBC BLD AUTO-RTO: 0 /100
PLATELET # BLD AUTO: 196 10E3/UL (ref 150–450)
POTASSIUM SERPL-SCNC: 3.6 MMOL/L (ref 3.4–5.3)
PROT SERPL-MCNC: 6.7 G/DL (ref 6.4–8.3)
RBC # BLD AUTO: 3.85 10E6/UL (ref 4.4–5.9)
SODIUM SERPL-SCNC: 140 MMOL/L (ref 135–145)
WBC # BLD AUTO: 4.6 10E3/UL (ref 4–11)

## 2025-01-13 PROCEDURE — 99213 OFFICE O/P EST LOW 20 MIN: CPT | Performed by: STUDENT IN AN ORGANIZED HEALTH CARE EDUCATION/TRAINING PROGRAM

## 2025-01-13 PROCEDURE — 85014 HEMATOCRIT: CPT | Performed by: STUDENT IN AN ORGANIZED HEALTH CARE EDUCATION/TRAINING PROGRAM

## 2025-01-13 PROCEDURE — 82947 ASSAY GLUCOSE BLOOD QUANT: CPT | Performed by: STUDENT IN AN ORGANIZED HEALTH CARE EDUCATION/TRAINING PROGRAM

## 2025-01-13 PROCEDURE — 99214 OFFICE O/P EST MOD 30 MIN: CPT | Mod: GC | Performed by: STUDENT IN AN ORGANIZED HEALTH CARE EDUCATION/TRAINING PROGRAM

## 2025-01-13 PROCEDURE — 82310 ASSAY OF CALCIUM: CPT | Performed by: STUDENT IN AN ORGANIZED HEALTH CARE EDUCATION/TRAINING PROGRAM

## 2025-01-13 PROCEDURE — 85004 AUTOMATED DIFF WBC COUNT: CPT | Performed by: STUDENT IN AN ORGANIZED HEALTH CARE EDUCATION/TRAINING PROGRAM

## 2025-01-13 PROCEDURE — 36415 COLL VENOUS BLD VENIPUNCTURE: CPT | Performed by: STUDENT IN AN ORGANIZED HEALTH CARE EDUCATION/TRAINING PROGRAM

## 2025-01-13 ASSESSMENT — PAIN SCALES - GENERAL: PAINLEVEL_OUTOF10: NO PAIN (0)

## 2025-01-13 NOTE — NURSING NOTE
Chief Complaint   Patient presents with    Blood Draw     Labs collected from venipuncture by RN. Vitals taken. Checked in for appointment(s).       Labs collected from venipuncture by RN. Vitals taken. Checked in for appointment(s).     Jada Richards RN

## 2025-01-13 NOTE — PROGRESS NOTES
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      Oncology HPI: Pedrito is a 54 year old man with nodular lymphocyte predominant Hodgkin lymphoma.  Patient had been in his usual state of health until June, 2024 when he developed gradually progressive nausea and mild abdominal discomfort.  He also had some loose stools but not watery diarrhea.  The symptoms led to medical attention.  Initially, an exhaustive workup for gastrointestinal pathology was done; IgA- 37, Vitamin B12-WNL, vitamin D-WNL, ESR-WNL, CRP-WNL, TSH-WNL, tissue transglutaminase antibodies- neg, lipase levels-WNL, calprotectin- neg, H. Pylori stool antigen- neg, enteric bacterial panel- positive for enterotoxigenic E. Coli.  He was given an antibiotic for 1 dose and that did not resolve his symptoms.  7/23/24 CT scan was done as well to further investigate patient's nausea and abdominal pain which showed 5 cm x 4.7 cm x 5.3 cm enhancing soft tissue mass in the mesentery and there are scattered mesenteric lymphadenopathy surrounding this lesion and mesenteric stranding and haziness.  He subsequently saw Dr. Werner from general surgery and underwent laparoscopic biopsy of the mass on 8/6/2024.  Patient also had umbilical hernia that was repaired.  Patient recovered from the surgery without any major complications.  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  Subsequent PET scan done on 8/16/24 showed hypermetabolic mesenteric mass correlating with the prior CT scan with SUV max of 11.  There are scattered hypermetabolic lymphadenopathy in the mesentery surrounding the mass as well as mesenteric stranding. Patient does not have any major cytopenias on peripheral blood work as well as no renal or kidney abnormalities.      He was started on R-CHOP (C1D1=8/28/24).  10/4/24 pet s/p C2 demonstrated  no evidence of disease,  metabolic CR. Subsequently he went on to finish cycle 6. End of treatment PET showed persistent CR. His abdominal symptoms resolved.    Interval history:   Pedrito presents to clinic for follow up after 6 cycles of R-CHOP.   He is doing overall well.  He has had low energy levels and feels that this past cycle hit harder.  Still able to perform ADLs and light housework.  His appetite has been a little low but is still eating 3 meals per day.      He is still having some depressed moods but feels the Lexapro is going well and does not need an increase at this time.    He did have some increased nausea but no vomiting and feels it is manageable with his antiemetics before bed at night.    Denies fevers, night sweats, diarrhea, constipation, neuropathy, mouth sores, rashes/sores, new pains, new lumps/bumps, all other acute issues or concerns.    Current Outpatient Medications   Medication Sig Dispense Refill    acetaminophen (TYLENOL) 325 MG tablet Take 2 tablets (650 mg) by mouth every 4 hours as needed for mild pain      acyclovir (ZOVIRAX) 400 MG tablet Take 1 tablet (400 mg) by mouth every 12 hours. 60 tablet 11    allopurinol (ZYLOPRIM) 300 MG tablet Take 1 tablet (300 mg) by mouth daily. 30 tablet 0    escitalopram (LEXAPRO) 10 MG tablet Take 1 tablet (10 mg) by mouth daily. 60 tablet 1    ondansetron (ZOFRAN) 8 MG tablet Take 1 tablet (8 mg) by mouth every 8 hours as needed for nausea (vomiting). Do not start before August 25, 2024. 30 tablet 2    predniSONE (DELTASONE) 50 MG tablet Take 1 tablet (50 mg) by mouth daily. 5 tablet 1    prochlorperazine (COMPAZINE) 10 MG tablet Take 1 tablet (10 mg) by mouth every 6 hours as needed for nausea or vomiting. 60 tablet 2    SENNA-docusate sodium (SENNA S) 8.6-50 MG tablet Take 1 tablet by mouth at bedtime. 30 tablet 1       Physical Exam:  /86   Pulse 57   Temp 98.3  F (36.8  C)   Resp 16   Wt 88 kg (194 lb)   SpO2 98%   BMI 28.65 kg/m    General: No acute  distress, pleasant, well-groomed    HEENT: EOMI, PERRL. Sclerae are anicteric. Oral exam deferred  Lymph: Neck is supple with no lymphadenopathy in the cervical, supraclavicular, or axillary areas.   Heart: Regular rate and rhythm.   Lungs: Clear to auscultation bilaterally.   Extremities: No lower extremity edema noted bilaterally.   Neuro: Alert and oriented x3, CN grossly intact, steady gait  Skin: No rashes, petechiae, or bruising noted on exposed skin.    Labs: I personally reviewed labs mentioned below  Most Recent 3 CBC's:  Recent Labs   Lab Test 01/13/25  1646 12/10/24  0923 11/19/24  0700   WBC 4.6 2.3* 3.4*   HGB 12.6* 12.0* 12.1*   MCV 91 90 89    236 292   ANEUTAUTO 3.0 1.3* 2.1     Most Recent 3 BMP's:  Recent Labs   Lab Test 12/10/24  0923 11/19/24  0700 10/29/24  1054    140 139   POTASSIUM 4.1 3.8 4.1   CHLORIDE 104 106 105   CO2 24 22 23   BUN 17.5 14.9 13.6   CR 0.95 0.98 1.04   ANIONGAP 10 12 11   NEY 9.4 9.4 9.7   * 134* 99   PROTTOTAL 6.4 6.3* 6.8   ALBUMIN 4.3 4.2 4.4    Most Recent 3 LFT's:  Recent Labs   Lab Test 12/10/24  0923 11/19/24  0700 10/29/24  1054   AST 47* 40 43   ALT 49 38 47   ALKPHOS 82 89 81   BILITOTAL 0.3 0.2 0.4    Most Recent 2 TSH and T4:  Recent Labs   Lab Test 06/21/24  0853   TSH 2.17     Imaging: I personally reviewed PETCT neck/chest/abdomen/pelvis and discussed with the patient.  Complete response to treatment so far.      Impression/plan:     1) Nodular lymphocyte predominant Hodgkin lymphoma:  - He completes therapy and has CR. We will now begin surveillance. I will see him in 3 months with history physical exam and labs.  - stop acyclovir and allopurinol.    2) Depressed Mood: On Lexapro 10 mg daily and has noticed improvement in his moods. Would like to continue at current dose of 10 mg daily.  - Consulted with Psych Onc 11/7    Discussed with Dr. Aly Pal MD MS  Hematology fellow, PGY5  Pager: [phone]    I saw and  examined the patient with fellow. I discussed assessment and plan. I agree with findings documented in fellows note.    Total time spent on date of service in review of medical records, review of labs, history taking, physical exam, discussion of assessment and plan, counseling and patient education is 30 minutes.    Lalito Lu MD  Attending Physician  Pager [phone]

## 2025-01-13 NOTE — LETTER
1/13/2025      Pedrito Jenkins  [address]  [place] [address]      Dear Colleague,    Thank you for referring your patient, Pedrito Jenkins, to the [facility]. Please see a copy of my visit note below.    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      Oncology HPI: Pedrito is a 54 year old man with nodular lymphocyte predominant Hodgkin lymphoma.  Patient had been in his usual state of health until June, 2024 when he developed gradually progressive nausea and mild abdominal discomfort.  He also had some loose stools but not watery diarrhea.  The symptoms led to medical attention.  Initially, an exhaustive workup for gastrointestinal pathology was done; IgA- 37, Vitamin B12-WNL, vitamin D-WNL, ESR-WNL, CRP-WNL, TSH-WNL, tissue transglutaminase antibodies- neg, lipase levels-WNL, calprotectin- neg, H. Pylori stool antigen- neg, enteric bacterial panel- positive for enterotoxigenic E. Coli.  He was given an antibiotic for 1 dose and that did not resolve his symptoms.  7/23/24 CT scan was done as well to further investigate patient's nausea and abdominal pain which showed 5 cm x 4.7 cm x 5.3 cm enhancing soft tissue mass in the mesentery and there are scattered mesenteric lymphadenopathy surrounding this lesion and mesenteric stranding and haziness.  He subsequently saw Dr. Werner from general surgery and underwent laparoscopic biopsy of the mass on 8/6/2024.  Patient also had umbilical hernia that was repaired.  Patient recovered from the surgery without any major complications.  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  Subsequent PET scan done on 8/16/24 showed hypermetabolic mesenteric mass correlating with the prior CT scan with SUV max of 11.  There are scattered hypermetabolic lymphadenopathy in the mesentery surrounding the mass as well as  mesenteric stranding. Patient does not have any major cytopenias on peripheral blood work as well as no renal or kidney abnormalities.      He was started on R-CHOP (C1D1=8/28/24).  10/4/24 pet s/p C2 demonstrated  no evidence of disease, metabolic CR. Subsequently he went on to finish cycle 6. End of treatment PET showed persistent CR. His abdominal symptoms resolved.    Interval history:   Pedrito presents to clinic for follow up after 6 cycles of R-CHOP.   He is doing overall well.  He has had low energy levels and feels that this past cycle hit harder.  Still able to perform ADLs and light housework.  His appetite has been a little low but is still eating 3 meals per day.      He is still having some depressed moods but feels the Lexapro is going well and does not need an increase at this time.    He did have some increased nausea but no vomiting and feels it is manageable with his antiemetics before bed at night.    Denies fevers, night sweats, diarrhea, constipation, neuropathy, mouth sores, rashes/sores, new pains, new lumps/bumps, all other acute issues or concerns.    Current Outpatient Medications   Medication Sig Dispense Refill     acetaminophen (TYLENOL) 325 MG tablet Take 2 tablets (650 mg) by mouth every 4 hours as needed for mild pain       acyclovir (ZOVIRAX) 400 MG tablet Take 1 tablet (400 mg) by mouth every 12 hours. 60 tablet 11     allopurinol (ZYLOPRIM) 300 MG tablet Take 1 tablet (300 mg) by mouth daily. 30 tablet 0     escitalopram (LEXAPRO) 10 MG tablet Take 1 tablet (10 mg) by mouth daily. 60 tablet 1     ondansetron (ZOFRAN) 8 MG tablet Take 1 tablet (8 mg) by mouth every 8 hours as needed for nausea (vomiting). Do not start before August 25, 2024. 30 tablet 2     predniSONE (DELTASONE) 50 MG tablet Take 1 tablet (50 mg) by mouth daily. 5 tablet 1     prochlorperazine (COMPAZINE) 10 MG tablet Take 1 tablet (10 mg) by mouth every 6 hours as needed for nausea or vomiting. 60 tablet 2      SENNA-docusate sodium (SENNA S) 8.6-50 MG tablet Take 1 tablet by mouth at bedtime. 30 tablet 1       Physical Exam:  /86   Pulse 57   Temp 98.3  F (36.8  C)   Resp 16   Wt 88 kg (194 lb)   SpO2 98%   BMI 28.65 kg/m    General: No acute distress, pleasant, well-groomed    HEENT: EOMI, PERRL. Sclerae are anicteric. Oral exam deferred  Lymph: Neck is supple with no lymphadenopathy in the cervical, supraclavicular, or axillary areas.   Heart: Regular rate and rhythm.   Lungs: Clear to auscultation bilaterally.   Extremities: No lower extremity edema noted bilaterally.   Neuro: Alert and oriented x3, CN grossly intact, steady gait  Skin: No rashes, petechiae, or bruising noted on exposed skin.    Labs: I personally reviewed labs mentioned below  Most Recent 3 CBC's:  Recent Labs   Lab Test 01/13/25  1646 12/10/24  0923 11/19/24  0700   WBC 4.6 2.3* 3.4*   HGB 12.6* 12.0* 12.1*   MCV 91 90 89    236 292   ANEUTAUTO 3.0 1.3* 2.1     Most Recent 3 BMP's:  Recent Labs   Lab Test 12/10/24  0923 11/19/24  0700 10/29/24  1054    140 139   POTASSIUM 4.1 3.8 4.1   CHLORIDE 104 106 105   CO2 24 22 23   BUN 17.5 14.9 13.6   CR 0.95 0.98 1.04   ANIONGAP 10 12 11   NEY 9.4 9.4 9.7   * 134* 99   PROTTOTAL 6.4 6.3* 6.8   ALBUMIN 4.3 4.2 4.4    Most Recent 3 LFT's:  Recent Labs   Lab Test 12/10/24  0923 11/19/24  0700 10/29/24  1054   AST 47* 40 43   ALT 49 38 47   ALKPHOS 82 89 81   BILITOTAL 0.3 0.2 0.4    Most Recent 2 TSH and T4:  Recent Labs   Lab Test 06/21/24  0853   TSH 2.17     Imaging: I personally reviewed PETCT neck/chest/abdomen/pelvis and discussed with the patient.  Complete response to treatment so far.      Impression/plan:     1) Nodular lymphocyte predominant Hodgkin lymphoma:  - He completes therapy and has CR. We will now begin surveillance. I will see him in 3 months with history physical exam and labs.  - stop acyclovir and allopurinol.    2) Depressed Mood: On Lexapro 10 mg daily  and has noticed improvement in his moods. Would like to continue at current dose of 10 mg daily.  - Consulted with Psych Onc 11/7    Discussed with Dr. Aly Pal MD MS  Hematology fellow, PGY5  Pager: [phone]    I saw and examined the patient with fellow. I discussed assessment and plan. I agree with findings documented in fellows note.    Total time spent on date of service in review of medical records, review of labs, history taking, physical exam, discussion of assessment and plan, counseling and patient education is 30 minutes.    Lalito Lu MD  Attending Physician  Pager [phone]      Again, thank you for allowing me to participate in the care of your patient.        Sincerely,        Lalito Lu MD    Electronically signed

## 2025-01-13 NOTE — NURSING NOTE
"Oncology Rooming Note    January 13, 2025 5:05 PM   Pedrito Jenkins is a 54 year old male who presents for:    Chief Complaint   Patient presents with    Blood Draw     Labs collected from venipuncture by RN. Vitals taken. Checked in for appointment(s).     Oncology Clinic Visit     Mediastinal mass     Initial Vitals: /86   Pulse 57   Temp 98.3  F (36.8  C)   Resp 16   Wt 88 kg (194 lb)   SpO2 98%   BMI 28.65 kg/m   Estimated body mass index is 28.65 kg/m  as calculated from the following:    Height as of 11/7/24: 1.753 m (5' 9\").    Weight as of this encounter: 88 kg (194 lb). Body surface area is 2.07 meters squared.  No Pain (0) Comment: Data Unavailable   No LMP for male patient.  Allergies reviewed: Yes  Medications reviewed: Yes    Medications: Medication refills not needed today.  Pharmacy name entered into [facility]: [id number] - [place], [place] - [address]    Frailty Screening:   Is the patient here for a new oncology consult visit in cancer care? 2. No      Clinical concerns: none      Matt Rand LPN              "

## 2025-01-13 NOTE — PROGRESS NOTES
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      Oncology HPI: Pedrito is a 54 year old man with nodular lymphocyte predominant Hodgkin lymphoma.  Patient had been in his usual state of health until June, 2024 when he developed gradually progressive nausea and mild abdominal discomfort.  He also had some loose stools but not watery diarrhea.  The symptoms led to medical attention.  Initially, an exhaustive workup for gastrointestinal pathology was done; IgA- 37, Vitamin B12-WNL, vitamin D-WNL, ESR-WNL, CRP-WNL, TSH-WNL, tissue transglutaminase antibodies- neg, lipase levels-WNL, calprotectin- neg, H. Pylori stool antigen- neg, enteric bacterial panel- positive for enterotoxigenic E. Coli.  He was given an antibiotic for 1 dose and that did not resolve his symptoms.  7/23/24 CT scan was done as well to further investigate patient's nausea and abdominal pain which showed 5 cm x 4.7 cm x 5.3 cm enhancing soft tissue mass in the mesentery and there are scattered mesenteric lymphadenopathy surrounding this lesion and mesenteric stranding and haziness.  He subsequently saw Dr. Werner from general surgery and underwent laparoscopic biopsy of the mass on 8/6/2024.  Patient also had umbilical hernia that was repaired.  Patient recovered from the surgery without any major complications.  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  Subsequent PET scan done on 8/16/24 showed hypermetabolic mesenteric mass correlating with the prior CT scan with SUV max of 11.  There are scattered hypermetabolic lymphadenopathy in the mesentery surrounding the mass as well as mesenteric stranding. Patient does not have any major cytopenias on peripheral blood work as well as no renal or kidney abnormalities.      He was started on R-CHOP (C1D1=8/28/24).  10/4/24 pet s/p C2 demonstrated  no evidence of disease,  metabolic CR.    Interval history:   Pedrito presents to clinic for follow up after 6 cycles of R-CHOP.   He is doing overall well.  He continues to have some fatigue but it has not worsened. He worked throughout chemoimmunotherapy with only a few days off after the infusions. His appetite is improving and abdominal discomfort that he felt before treatment is much improved. Weight is stable. No night sweats. He has mild dyspnea on rigorous exertion but not limiting his routine activity. No orthopnea, no leg swelling. No palpable lumps.     Denies fevers, night sweats, diarrhea, constipation, neuropathy, mouth sores, rashes/sores, new pains, new lumps/bumps, all other acute issues or concerns.    Current Outpatient Medications   Medication Sig Dispense Refill    acetaminophen (TYLENOL) 325 MG tablet Take 2 tablets (650 mg) by mouth every 4 hours as needed for mild pain      acyclovir (ZOVIRAX) 400 MG tablet Take 1 tablet (400 mg) by mouth every 12 hours. 60 tablet 11    allopurinol (ZYLOPRIM) 300 MG tablet Take 1 tablet (300 mg) by mouth daily. 30 tablet 0    escitalopram (LEXAPRO) 10 MG tablet Take 1 tablet (10 mg) by mouth daily. 60 tablet 1    ondansetron (ZOFRAN) 8 MG tablet Take 1 tablet (8 mg) by mouth every 8 hours as needed for nausea (vomiting). Do not start before August 25, 2024. 30 tablet 2    predniSONE (DELTASONE) 50 MG tablet Take 1 tablet (50 mg) by mouth daily. 5 tablet 1    prochlorperazine (COMPAZINE) 10 MG tablet Take 1 tablet (10 mg) by mouth every 6 hours as needed for nausea or vomiting. 60 tablet 2    SENNA-docusate sodium (SENNA S) 8.6-50 MG tablet Take 1 tablet by mouth at bedtime. 30 tablet 1       Physical Exam:  /86   Pulse 57   Temp 98.3  F (36.8  C)   Resp 16   Wt 88 kg (194 lb)   SpO2 98%   BMI 28.65 kg/m    General: No acute distress, pleasant, well-groomed    HEENT: EOMI, PERRL. Sclerae are anicteric. Oral exam deferred  Lymph: Neck is supple with no lymphadenopathy in the  cervical, supraclavicular, or axillary areas.   Heart: Regular rate and rhythm.   Lungs: Clear to auscultation bilaterally.   Extremities: No lower extremity edema noted bilaterally.   Neuro: Alert and oriented x3, CN grossly intact, steady gait  Skin: No rashes, petechiae, or bruising noted on exposed skin.    Labs:   Most Recent 3 CBC's:  Recent Labs   Lab Test 01/13/25  1646 12/10/24  0923 11/19/24  0700   WBC 4.6 2.3* 3.4*   HGB 12.6* 12.0* 12.1*   MCV 91 90 89    236 292   ANEUTAUTO 3.0 1.3* 2.1     Most Recent 3 BMP's:  Recent Labs   Lab Test 12/10/24  0923 11/19/24  0700 10/29/24  1054    140 139   POTASSIUM 4.1 3.8 4.1   CHLORIDE 104 106 105   CO2 24 22 23   BUN 17.5 14.9 13.6   CR 0.95 0.98 1.04   ANIONGAP 10 12 11   NEY 9.4 9.4 9.7   * 134* 99   PROTTOTAL 6.4 6.3* 6.8   ALBUMIN 4.3 4.2 4.4    Most Recent 3 LFT's:  Recent Labs   Lab Test 12/10/24  0923 11/19/24  0700 10/29/24  1054   AST 47* 40 43   ALT 49 38 47   ALKPHOS 82 89 81   BILITOTAL 0.3 0.2 0.4    Most Recent 2 TSH and T4:  Recent Labs   Lab Test 06/21/24  0853   TSH 2.17     PET/CT 1/10/25  IMPRESSION:   In this patient with history of nodular lymphocyte predominant Hodgkin  lymphoma:  1.  Persistent complete metabolic response, with continued decreased  size of the mesenteric mass and stable appearance of the adjacent  mesenteric lymph nodes.  2.  Unchanged subcortical cystic focus at the right femoral head,  which again may reflect osteoarthritis or osteonecrosis.  3.  Incidental CT findings, as above    Impression/plan:     Nodular lymphocyte predominant Hodgkin lymphoma:  - He is in CR without any radiographic evidence of lymphoma after 6 cycles of R-CHOP which he has tolerated well.  No significant side-effects/toxicities.   - Will follow up in 3 months with labs. May consider CT based on symptoms. Patient advised to call sooner if new symptoms develop or any other concerns.    Nausea, constipation: much improved.  Continue PRN compazine and senna respectively     Depressed Mood: On Lexapro 10 mg daily and has noticed improvement in his moods. Would like to continue at current dose of 10 mg daily.  - Consulted with Psych Onc 11/7    Discussed with Dr. Aly Pal MD MS  Hematology fellow, PGY5  Pager: [phone]

## 2025-01-24 SDOH — HEALTH STABILITY: PHYSICAL HEALTH: ON AVERAGE, HOW MANY MINUTES DO YOU ENGAGE IN EXERCISE AT THIS LEVEL?: 30 MIN

## 2025-01-24 SDOH — HEALTH STABILITY: PHYSICAL HEALTH: ON AVERAGE, HOW MANY DAYS PER WEEK DO YOU ENGAGE IN MODERATE TO STRENUOUS EXERCISE (LIKE A BRISK WALK)?: 2 DAYS

## 2025-01-24 ASSESSMENT — SOCIAL DETERMINANTS OF HEALTH (SDOH): HOW OFTEN DO YOU GET TOGETHER WITH FRIENDS OR RELATIVES?: ONCE A WEEK

## 2025-01-29 ENCOUNTER — OFFICE VISIT (OUTPATIENT)
Dept: FAMILY MEDICINE | Facility: CLINIC | Age: 55
End: 2025-01-29
Attending: FAMILY MEDICINE
Payer: COMMERCIAL

## 2025-01-29 VITALS
WEIGHT: 193 LBS | HEART RATE: 58 BPM | SYSTOLIC BLOOD PRESSURE: 128 MMHG | BODY MASS INDEX: 28.58 KG/M2 | HEIGHT: 69 IN | OXYGEN SATURATION: 96 % | TEMPERATURE: 97.8 F | DIASTOLIC BLOOD PRESSURE: 72 MMHG | RESPIRATION RATE: 12 BRPM

## 2025-01-29 DIAGNOSIS — C81.03 NODULAR LYMPHOCYTE PREDOMINANT HODGKIN LYMPHOMA OF INTRA-ABDOMINAL LYMPH NODES (H): ICD-10-CM

## 2025-01-29 DIAGNOSIS — H61.21 IMPACTED CERUMEN OF RIGHT EAR: ICD-10-CM

## 2025-01-29 DIAGNOSIS — R73.03 PREDIABETES: ICD-10-CM

## 2025-01-29 DIAGNOSIS — Z00.00 ROUTINE GENERAL MEDICAL EXAMINATION AT A HEALTH CARE FACILITY: Primary | ICD-10-CM

## 2025-01-29 PROCEDURE — 69209 REMOVE IMPACTED EAR WAX UNI: CPT | Mod: 4MD | Performed by: FAMILY MEDICINE

## 2025-01-29 PROCEDURE — 99396 PREV VISIT EST AGE 40-64: CPT | Performed by: FAMILY MEDICINE

## 2025-01-29 ASSESSMENT — PAIN SCALES - GENERAL: PAINLEVEL_OUTOF10: NO PAIN (0)

## 2025-01-29 NOTE — PATIENT INSTRUCTIONS
Patient Education   Preventive Care Advice   This is general advice given by our system to help you stay healthy. However, your care team may have specific advice just for you. Please talk to your care team about your preventive care needs.  Nutrition  Eat 5 or more servings of fruits and vegetables each day.  Try wheat bread, brown rice and whole grain pasta (instead of white bread, rice, and pasta).  Get enough calcium and vitamin D. Check the label on foods and aim for 100% of the RDA (recommended daily allowance).  Lifestyle  Exercise at least 150 minutes each week  (30 minutes a day, 5 days a week).  Do muscle strengthening activities 2 days a week. These help control your weight and prevent disease.  No smoking.  Wear sunscreen to prevent skin cancer.  Have a dental exam and cleaning every 6 months.  Yearly exams  See your health care team every year to talk about:  Any changes in your health.  Any medicines your care team has prescribed.  Preventive care, family planning, and ways to prevent chronic diseases.  Shots (vaccines)   HPV shots (up to age 26), if you've never had them before.  Hepatitis B shots (up to age 59), if you've never had them before.  COVID-19 shot: Get this shot when it's due.  Flu shot: Get a flu shot every year.  Tetanus shot: Get a tetanus shot every 10 years.  Pneumococcal, hepatitis A, and RSV shots: Ask your care team if you need these based on your risk.  Shingles shot (for age 50 and up)  General health tests  Diabetes screening:  Starting at age 35, Get screened for diabetes at least every 3 years.  If you are younger than age 35, ask your care team if you should be screened for diabetes.  Cholesterol test: At age 39, start having a cholesterol test every 5 years, or more often if advised.  Bone density scan (DEXA): At age 50, ask your care team if you should have this scan for osteoporosis (brittle bones).  Hepatitis C: Get tested at least once in your life.  STIs (sexually  transmitted infections)  Before age 24: Ask your care team if you should be screened for STIs.  After age 24: Get screened for STIs if you're at risk. You are at risk for STIs (including HIV) if:  You are sexually active with more than one person.  You don't use condoms every time.  You or a partner was diagnosed with a sexually transmitted infection.  If you are at risk for HIV, ask about PrEP medicine to prevent HIV.  Get tested for HIV at least once in your life, whether you are at risk for HIV or not.  Cancer screening tests  Cervical cancer screening: If you have a cervix, begin getting regular cervical cancer screening tests starting at age 21.  Breast cancer scan (mammogram): If you've ever had breasts, begin having regular mammograms starting at age 40. This is a scan to check for breast cancer.  Colon cancer screening: It is important to start screening for colon cancer at age 45.  Have a colonoscopy test every 10 years (or more often if you're at risk) Or, ask your provider about stool tests like a FIT test every year or Cologuard test every 3 years.  To learn more about your testing options, visit:   .  For help making a decision, visit:   https://bit.ly/mm97291.  Prostate cancer screening test: If you have a prostate, ask your care team if a prostate cancer screening test (PSA) at age 55 is right for you.  Lung cancer screening: If you are a current or former smoker ages 50 to 80, ask your care team if ongoing lung cancer screenings are right for you.  For informational purposes only. Not to replace the advice of your health care provider. Copyright   2023 [employer]. All rights reserved. Clinically reviewed by the [facility] Transitions Program. [employer] [id number] - REV 01/24.

## 2025-01-29 NOTE — PROGRESS NOTES
Preventive Care Visit  [facility]  Migue Martinez MD, Family Medicine  Jan 29, 2025    Assessment & Plan   1. Routine general medical examination at a health care facility (Primary)  Discussed personal health and safety. Routine screenings ordered as below. Appropriate anticipatory guidance, vaccinations, and health screening recommendations delivered according to the USPSTF and other appropriate society guidelines. Pedrito reports understanding and in agreement with this mutually agreed upon plan.    Today's Orders:  - Lipid panel reflex to direct LDL Non-fasting; Future  - Prostate Specific Antigen Screen; Future  - Hemoglobin A1c; Future  - SC REMOVAL IMPACTED CERUMEN IRRIGATION/LVG UNILAT    2. 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    3. Prediabetes  Monitor yearly A1c. Encourage weight loss and healthy diet.  - Hemoglobin A1c; Future    4. Impacted cerumen of right ear  Noted on exam. Irrigated by MA staff. OK for nursing only visits for repeat irrigation over the next 3 months for recurrences per [place] policy.  - SC REMOVAL IMPACTED CERUMEN IRRIGATION/LVG UNILAT       Follow-up Visit   Expected date:  Jan 29, 2026 (Approximate)      Follow Up Appointment Details:     Follow-up with whom?: PCP    Follow-Up for what?: Adult Preventive    How?: In Person               Migue Martinez MD  [facility]    Disclaimer: This note consists of symbols derived from keyboarding, dictation and/or voice recognition software. As a result, there may be errors in the script that have gone undetected. Please consider this when interpreting information found in this chart.    The longitudinal plan of care for the diagnosis(es)/condition(s) as documented were addressed during this visit. Due to the added complexity in care, I will continue to  support Pedrito in the subsequent management and with ongoing continuity of care.    Subjective   Pedrito is a 54 year old, presenting for the following:  Physical        1/29/2025     7:34 AM   Additional Questions   Roomed by Marta Orellana CMA   Accompanied by self         1/29/2025     7:34 AM   Patient Reported Additional Medications   Patient reports taking the following new medications none          HPI  No concerns.     Surveillance status now with oncology.    Health Care Directive  Patient does not have a Health Care Directive: Discussed advance care planning with patient; however, patient declined at this time.      1/24/2025   General Health   How would you rate your overall physical health? Good   Feel stress (tense, anxious, or unable to sleep) Only a little   (!) STRESS CONCERN      1/24/2025   Nutrition   Three or more servings of calcium each day? Yes   Diet: Regular (no restrictions)   How many servings of fruit and vegetables per day? (!) 2-3   How many sweetened beverages each day? 0-1         1/24/2025   Exercise   Days per week of moderate/strenous exercise 2 days   Average minutes spent exercising at this level 30 min   (!) EXERCISE CONCERN      1/24/2025   Social Factors   Frequency of gathering with friends or relatives Once a week   Worry food won't last until get money to buy more No    No   Food not last or not have enough money for food? No    No   Do you have housing? (Housing is defined as stable permanent housing and does not include staying ouside in a car, in a tent, in an abandoned building, in an overnight shelter, or couch-surfing.) Yes    Yes   Are you worried about losing your housing? No    No   Lack of transportation? No    No   Unable to get utilities (heat,electricity)? No    No       Multiple values from one day are sorted in reverse-chronological order         1/24/2025   Fall Risk   Fallen 2 or more times in the past year? No   Trouble with walking or balance? No           1/24/2025   Dental   Dentist two times every year? Yes         1/24/2025   TB Screening   Were you born outside of the [place]? No         Today's PHQ-2 Score:       1/28/2025     4:37 PM   PHQ-2 ( 1999 [employer])   Q1: Little interest or pleasure in doing things 1   Q2: Feeling down, depressed or hopeless 1   PHQ-2 Score 2    Q1: Little interest or pleasure in doing things Several days   Q2: Feeling down, depressed or hopeless Several days   PHQ-2 Score 2       Patient-reported           1/24/2025   Substance Use   Alcohol more than 3/day or more than 7/wk No   Do you use any other substances recreationally? No     Social History     Tobacco Use    Smoking status: Never     Passive exposure: Never    Smokeless tobacco: Never   Vaping Use    Vaping status: Never Used   Substance Use Topics    Alcohol use: Yes     Alcohol/week: 2.0 - 3.0 standard drinks of alcohol     Types: 2 - 3 Standard drinks or equivalent per week     Comment: Couple times/month    Drug use: No           1/24/2025   STI Screening   New sexual partner(s) since last STI/HIV test? No   ASCVD Risk   The 10-year ASCVD risk score (Stephy CABALLERO, et al., 2019) is: 4.1%    Values used to calculate the score:      Age: 54 years      Sex: Male      Is Non- : No      Diabetic: No      Tobacco smoker: No      Systolic Blood Pressure: 128 mmHg      Is BP treated: No      HDL Cholesterol: 48 mg/dL      Total Cholesterol: 161 mg/dL    Reviewed and updated as needed this visit by Provider   Tobacco  Allergies  Meds  Problems  Med Hx  Surg Hx  Fam Hx        Social Hx Reviewed    Past Medical History:   Diagnosis Date    Cancer (H) 8/1/2024    Cancer free as of 1/13/3035    NO ACTIVE PROBLEMS      Past Surgical History:   Procedure Laterality Date    BIOPSY  8/16/2024    COLONOSCOPY  4/18/23    COLONOSCOPY WITH CO2 INSUFFLATION N/A 04/18/2023    Procedure: COLONOSCOPY, WITH CO2 INSUFFLATION;  Surgeon: Terrence Cole MD;  Location:   "OR    LAPAROSCOPIC BIOPSY (GENERIC) N/A 08/06/2024    Procedure: LAPAROSCOPIC,  ABDOMINAL BIOPSY, ASPIRATION OF PERITONEAL FLUID, UMBILICAL HERNIA REPAIR;  Surgeon: Charly Werner MD;  Location: [facility] OR    VASECTOMY  2010    Dr. Narayanan     Review of Systems  Constitutional, HEENT, cardiovascular, pulmonary, GI, , musculoskeletal, neuro, skin, endocrine and psych systems are negative, except as otherwise noted.     Objective    Exam  /72   Pulse 58   Temp 97.8  F (36.6  C) (Temporal)   Resp 12   Ht 1.745 m (5' 8.7\")   Wt 87.5 kg (193 lb)   SpO2 96%   BMI 28.75 kg/m     Estimated body mass index is 28.75 kg/m  as calculated from the following:    Height as of this encounter: 1.745 m (5' 8.7\").    Weight as of this encounter: 87.5 kg (193 lb).    Physical Exam  HENT:      Head: Normocephalic and atraumatic.      Right Ear: Tympanic membrane, ear canal and external ear normal. There is no impacted cerumen.      Left Ear: Tympanic membrane, ear canal and external ear normal. There is no impacted cerumen.      Nose: Nose normal. No congestion.      Mouth/Throat:      Pharynx: Oropharynx is clear. No oropharyngeal exudate or posterior oropharyngeal erythema.   Eyes:      General:         Right eye: No discharge.         Left eye: No discharge.      Extraocular Movements: Extraocular movements intact.      Conjunctiva/sclera: Conjunctivae normal.      Pupils: Pupils are equal, round, and reactive to light.   Cardiovascular:      Rate and Rhythm: Normal rate and regular rhythm.      Heart sounds: Normal heart sounds. No murmur heard.     No friction rub.   Pulmonary:      Effort: Pulmonary effort is normal. No respiratory distress.      Breath sounds: Normal breath sounds. No wheezing or rhonchi.   Abdominal:      General: Abdomen is flat. There is no distension.      Palpations: Abdomen is soft. There is no mass.      Tenderness: There is no abdominal tenderness. There is no guarding.   Musculoskeletal:       "   General: No swelling.      Cervical back: Normal range of motion and neck supple. No tenderness.      Right lower leg: No edema.      Left lower leg: No edema.   Lymphadenopathy:      Cervical: No cervical adenopathy.   Skin:     General: Skin is warm.      Findings: No rash.   Neurological:      General: No focal deficit present.      Mental Status: He is alert.      Cranial Nerves: No cranial nerve deficit.      Motor: No weakness.      Coordination: Coordination normal.      Gait: Gait normal.   Psychiatric:         Mood and Affect: Mood normal.         Behavior: Behavior normal.         Thought Content: Thought content normal.         Judgment: Judgment normal.       Labs: Pending    Signed Electronically by: Migue Martinez MD

## 2025-01-29 NOTE — NURSING NOTE
Patient identified using two patient identifiers.  Ear exam showing wax occlusion completed by provider.  Solution: warm water was placed in the right ear(s) via irrigation tool: elephant ear.    Marta Orellana CMA ([facility])

## 2025-03-20 ENCOUNTER — PATIENT OUTREACH (OUTPATIENT)
Dept: ONCOLOGY | Facility: CLINIC | Age: 55
End: 2025-03-20
Payer: COMMERCIAL

## 2025-03-20 NOTE — PROGRESS NOTES
[facility]: Cancer Care Chart Review                                                                                        Situation: Patient chart reviewed by Oncology RN Care Coordinator.     Background: Pt seen by Dr. Lu on 1/13/25.     Assessment: No coordination needed at this time by RNCC. Status set as Maintenance for enrollment.     Plan/Recommendations: Follow-up with provider 3 months from last visit.     JERRY ZaldivarN, RN  RN Care Coordinator   [phone]

## 2025-04-14 ENCOUNTER — ONCOLOGY VISIT (OUTPATIENT)
Dept: ONCOLOGY | Facility: CLINIC | Age: 55
End: 2025-04-14
Attending: STUDENT IN AN ORGANIZED HEALTH CARE EDUCATION/TRAINING PROGRAM
Payer: COMMERCIAL

## 2025-04-14 VITALS
SYSTOLIC BLOOD PRESSURE: 131 MMHG | RESPIRATION RATE: 18 BRPM | TEMPERATURE: 98.1 F | BODY MASS INDEX: 28.99 KG/M2 | HEART RATE: 63 BPM | DIASTOLIC BLOOD PRESSURE: 77 MMHG | WEIGHT: 194.6 LBS | OXYGEN SATURATION: 98 %

## 2025-04-14 DIAGNOSIS — R73.03 PREDIABETES: ICD-10-CM

## 2025-04-14 DIAGNOSIS — Z00.00 ROUTINE GENERAL MEDICAL EXAMINATION AT A HEALTH CARE FACILITY: ICD-10-CM

## 2025-04-14 DIAGNOSIS — C81.03 NODULAR LYMPHOCYTE PREDOMINANT HODGKIN LYMPHOMA OF INTRA-ABDOMINAL LYMPH NODES (H): ICD-10-CM

## 2025-04-14 LAB
ALBUMIN SERPL BCG-MCNC: 4.5 G/DL (ref 3.5–5.2)
ALP SERPL-CCNC: 120 U/L (ref 40–150)
ALT SERPL W P-5'-P-CCNC: 20 U/L (ref 0–70)
ANION GAP SERPL CALCULATED.3IONS-SCNC: 12 MMOL/L (ref 7–15)
AST SERPL W P-5'-P-CCNC: 29 U/L (ref 0–45)
BASOPHILS # BLD AUTO: 0 10E3/UL (ref 0–0.2)
BASOPHILS NFR BLD AUTO: 1 %
BILIRUB SERPL-MCNC: 0.3 MG/DL
BUN SERPL-MCNC: 16.4 MG/DL (ref 6–20)
CALCIUM SERPL-MCNC: 9.5 MG/DL (ref 8.8–10.4)
CHLORIDE SERPL-SCNC: 104 MMOL/L (ref 98–107)
CHOLEST SERPL-MCNC: 154 MG/DL
CREAT SERPL-MCNC: 1.07 MG/DL (ref 0.67–1.17)
EGFRCR SERPLBLD CKD-EPI 2021: 82 ML/MIN/1.73M2
EOSINOPHIL # BLD AUTO: 0.1 10E3/UL (ref 0–0.7)
EOSINOPHIL NFR BLD AUTO: 2 %
ERYTHROCYTE [DISTWIDTH] IN BLOOD BY AUTOMATED COUNT: 12.3 % (ref 10–15)
EST. AVERAGE GLUCOSE BLD GHB EST-MCNC: 111 MG/DL
FASTING STATUS PATIENT QL REPORTED: NO
GLUCOSE SERPL-MCNC: 96 MG/DL (ref 70–99)
HBA1C MFR BLD: 5.5 %
HCO3 SERPL-SCNC: 22 MMOL/L (ref 22–29)
HCT VFR BLD AUTO: 39.6 % (ref 40–53)
HDLC SERPL-MCNC: 51 MG/DL
HGB BLD-MCNC: 14 G/DL (ref 13.3–17.7)
IMM GRANULOCYTES # BLD: 0 10E3/UL
IMM GRANULOCYTES NFR BLD: 0 %
LDH SERPL L TO P-CCNC: 200 U/L (ref 0–250)
LDLC SERPL CALC-MCNC: 66 MG/DL
LYMPHOCYTES # BLD AUTO: 0.6 10E3/UL (ref 0.8–5.3)
LYMPHOCYTES NFR BLD AUTO: 12 %
MCH RBC QN AUTO: 31.4 PG (ref 26.5–33)
MCHC RBC AUTO-ENTMCNC: 35.4 G/DL (ref 31.5–36.5)
MCV RBC AUTO: 89 FL (ref 78–100)
MONOCYTES # BLD AUTO: 0.8 10E3/UL (ref 0–1.3)
MONOCYTES NFR BLD AUTO: 14 %
NEUTROPHILS # BLD AUTO: 3.8 10E3/UL (ref 1.6–8.3)
NEUTROPHILS NFR BLD AUTO: 71 %
NONHDLC SERPL-MCNC: 103 MG/DL
NRBC # BLD AUTO: 0 10E3/UL
NRBC BLD AUTO-RTO: 0 /100
PLATELET # BLD AUTO: 231 10E3/UL (ref 150–450)
POTASSIUM SERPL-SCNC: 3.9 MMOL/L (ref 3.4–5.3)
PROT SERPL-MCNC: 6.7 G/DL (ref 6.4–8.3)
PSA SERPL DL<=0.01 NG/ML-MCNC: 0.42 NG/ML (ref 0–3.5)
RBC # BLD AUTO: 4.46 10E6/UL (ref 4.4–5.9)
SODIUM SERPL-SCNC: 138 MMOL/L (ref 135–145)
TRIGL SERPL-MCNC: 183 MG/DL
WBC # BLD AUTO: 5.3 10E3/UL (ref 4–11)

## 2025-04-14 PROCEDURE — 83718 ASSAY OF LIPOPROTEIN: CPT | Performed by: STUDENT IN AN ORGANIZED HEALTH CARE EDUCATION/TRAINING PROGRAM

## 2025-04-14 PROCEDURE — 99213 OFFICE O/P EST LOW 20 MIN: CPT | Performed by: STUDENT IN AN ORGANIZED HEALTH CARE EDUCATION/TRAINING PROGRAM

## 2025-04-14 PROCEDURE — 83036 HEMOGLOBIN GLYCOSYLATED A1C: CPT | Performed by: STUDENT IN AN ORGANIZED HEALTH CARE EDUCATION/TRAINING PROGRAM

## 2025-04-14 PROCEDURE — 83615 LACTATE (LD) (LDH) ENZYME: CPT | Performed by: STUDENT IN AN ORGANIZED HEALTH CARE EDUCATION/TRAINING PROGRAM

## 2025-04-14 PROCEDURE — 82374 ASSAY BLOOD CARBON DIOXIDE: CPT | Performed by: STUDENT IN AN ORGANIZED HEALTH CARE EDUCATION/TRAINING PROGRAM

## 2025-04-14 PROCEDURE — 85025 COMPLETE CBC W/AUTO DIFF WBC: CPT | Performed by: STUDENT IN AN ORGANIZED HEALTH CARE EDUCATION/TRAINING PROGRAM

## 2025-04-14 PROCEDURE — G0103 PSA SCREENING: HCPCS | Performed by: STUDENT IN AN ORGANIZED HEALTH CARE EDUCATION/TRAINING PROGRAM

## 2025-04-14 PROCEDURE — 36415 COLL VENOUS BLD VENIPUNCTURE: CPT | Performed by: STUDENT IN AN ORGANIZED HEALTH CARE EDUCATION/TRAINING PROGRAM

## 2025-04-14 PROCEDURE — 82465 ASSAY BLD/SERUM CHOLESTEROL: CPT | Performed by: STUDENT IN AN ORGANIZED HEALTH CARE EDUCATION/TRAINING PROGRAM

## 2025-04-14 PROCEDURE — 99213 OFFICE O/P EST LOW 20 MIN: CPT | Mod: GC | Performed by: STUDENT IN AN ORGANIZED HEALTH CARE EDUCATION/TRAINING PROGRAM

## 2025-04-14 PROCEDURE — 82310 ASSAY OF CALCIUM: CPT | Performed by: STUDENT IN AN ORGANIZED HEALTH CARE EDUCATION/TRAINING PROGRAM

## 2025-04-14 ASSESSMENT — PAIN SCALES - GENERAL: PAINLEVEL_OUTOF10: NO PAIN (0)

## 2025-04-14 NOTE — NURSING NOTE
Chief Complaint   Patient presents with    Blood Draw     Labs drawn via  by RN in lab, vitals taken.      Labs collected from venipuncture by RN. Vitals taken. Checked in for appointment(s).    Ronit Teague RN

## 2025-04-14 NOTE — PROGRESS NOTES
[facility]  Date of visit: 04/14/2025      Reason for Visit: Follow-up for nodular lymphocyte predominant Hodgkin lymphoma.      Oncology HPI: Pedrito is a 54 year old man with nodular lymphocyte predominant Hodgkin lymphoma.  Patient had been in his usual state of health until June 2024 when he developed gradually progressive nausea and mild abdominal discomfort.  He also had some loose stools but not watery diarrhea.  The symptoms led to medical attention.  Initially, an exhaustive workup for gastrointestinal pathology was done; IgA- 37, Vitamin B12-WNL, vitamin D-WNL, ESR-WNL, CRP-WNL, TSH-WNL, tissue transglutaminase antibodies- neg, lipase levels-WNL, calprotectin- neg, H. Pylori stool antigen- neg, enteric bacterial panel- positive for enterotoxigenic E. Coli.  He was given an antibiotic for 1 dose and that did not resolve his symptoms.  7/23/24 CT scan was done as well to further investigate patient's nausea and abdominal pain which showed 5 cm x 4.7 cm x 5.3 cm enhancing soft tissue mass in the mesentery and there are scattered mesenteric lymphadenopathy surrounding this lesion and mesenteric stranding and haziness.  He subsequently saw Dr. Werner from general surgery and underwent laparoscopic biopsy of the mass on 8/6/2024.  Patient also had umbilical hernia that was repaired.  Patient recovered from the surgery without any major complications.  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  Subsequent PET scan done on 8/16/24 showed hypermetabolic mesenteric mass correlating with the prior CT scan with SUV max of 11.  There are scattered hypermetabolic lymphadenopathy in the mesentery surrounding the mass as well as mesenteric stranding. Patient does not have any major cytopenias on peripheral blood work as well as no renal or kidney abnormalities.      He was started on R-CHOP (C1D1=8/28/24).  10/4/24 pet s/p C2 demonstrated  no evidence of disease,  metabolic CR. Subsequently he went on to finish cycle 6. End of treatment PET showed persistent CR. His abdominal symptoms resolved.    Interval history:   Pedrito presents to clinic for 3 months follow up. He finished 6 cycles of R-CHOP in December 2024 (last cycle received on 12/10/2024).  Since his last visit in January 2024, he is doing very well.  He is recovering from the effects of chemotherapy.  Recently went to [place] for vacation and at a time.  His mood is much better and he feels well overall.  He works at [employer].  He is physically active, runs on a treadmill and lifts weights.  Dyspnea, PND, cough, energy level is improving.  Appetite is good.  Weight is stable.    Denies fevers, night sweats, diarrhea, constipation, neuropathy, nausea, mouth sores, rashes/sores, new pains, new lumps/bumps, all other acute issues or concerns.    Current Outpatient Medications   Medication Sig Dispense Refill    escitalopram (LEXAPRO) 10 MG tablet Take 1 tablet (10 mg) by mouth daily. 60 tablet 1       Physical Exam:  /77 (BP Location: Right arm, Patient Position: Sitting, Cuff Size: Adult Regular)   Pulse 63   Temp 98.1  F (36.7  C) (Oral)   Resp 18   Wt 88.3 kg (194 lb 9.6 oz)   SpO2 98%   BMI 28.99 kg/m    General: No acute distress, pleasant, well-groomed    HEENT: EOMI, PERRL. Sclerae are anicteric. Oral exam deferred  Lymph: Neck is supple with no lymphadenopathy in the cervical, supraclavicular, or axillary areas.   Heart: Regular rate and rhythm.   Lungs: Clear to auscultation bilaterally.   Extremities: Trace bilateral pedal edema.  Neuro: Alert and oriented x3, CN grossly intact, steady gait  Skin: No rashes, petechiae, or bruising noted on exposed skin.    Labs: I personally reviewed labs mentioned below  Most Recent 3 CBC's:  Recent Labs   Lab Test 04/14/25  1548 01/13/25  1646 12/10/24  0923   WBC 5.3 4.6 2.3*   HGB 14.0 12.6* 12.0*   MCV 89 91 90    196 236   ANEUTAUTO 3.8 3.0 1.3*      Most Recent 3 BMP's:  Recent Labs   Lab Test 04/14/25  1548 01/13/25  1646 12/10/24  0923    140 138   POTASSIUM 3.9 3.6 4.1   CHLORIDE 104 104 104   CO2 22 23 24   BUN 16.4 14.3 17.5   CR 1.07 0.94 0.95   ANIONGAP 12 13 10   NEY 9.5 9.5 9.4   GLC 96 104* 112*   PROTTOTAL 6.7 6.7 6.4   ALBUMIN 4.5 4.5 4.3    Most Recent 3 LFT's:  Recent Labs   Lab Test 04/14/25  1548 01/13/25  1646 12/10/24  0923   AST 29 37 47*   ALT 20 51 49   ALKPHOS 120 91 82   BILITOTAL 0.3 0.3 0.3    Most Recent 2 TSH and T4:  Recent Labs   Lab Test 06/21/24  0853   TSH 2.17       Impression/plan:     1) Nodular lymphocyte predominant Hodgkin lymphoma:  - He has completed therapy with 6 cycles of R-CHOP, achieved CR.  We will continue surveillance with clinical follow-up every 3 months with labs and CT imaging every 6 months for 2 years and less frequently thereafter.      2) Depressed Mood:   - Doing much better.  Continue Lexapro 10 program daily    Discussed with Dr. Aly Pal MD MS  Hematology fellow, PGY5  Pager: [phone]    I saw and examined the patient with fellow. I discussed assessment and plan. I agree with findings documented in fellows note.    Total time spent on date of service in review of medical records, review of labs, history taking, physical exam, discussion of assessment and plan, counseling and patient education is 20 minutes.    Lalito Lu MD  Attending Physician  Pager [phone]

## 2025-04-14 NOTE — LETTER
4/14/2025      Pedrito Jenkins  [address]  [place] [address]      Dear Colleague,    Thank you for referring your patient, Pedrito Jenkins, to the [facility]. Please see a copy of my visit note below.    [facility]  Date of visit: 04/14/2025      Reason for Visit: Follow-up for nodular lymphocyte predominant Hodgkin lymphoma.      Oncology HPI: Pedrito is a 54 year old man with nodular lymphocyte predominant Hodgkin lymphoma.  Patient had been in his usual state of health until June 2024 when he developed gradually progressive nausea and mild abdominal discomfort.  He also had some loose stools but not watery diarrhea.  The symptoms led to medical attention.  Initially, an exhaustive workup for gastrointestinal pathology was done; IgA- 37, Vitamin B12-WNL, vitamin D-WNL, ESR-WNL, CRP-WNL, TSH-WNL, tissue transglutaminase antibodies- neg, lipase levels-WNL, calprotectin- neg, H. Pylori stool antigen- neg, enteric bacterial panel- positive for enterotoxigenic E. Coli.  He was given an antibiotic for 1 dose and that did not resolve his symptoms.  7/23/24 CT scan was done as well to further investigate patient's nausea and abdominal pain which showed 5 cm x 4.7 cm x 5.3 cm enhancing soft tissue mass in the mesentery and there are scattered mesenteric lymphadenopathy surrounding this lesion and mesenteric stranding and haziness.  He subsequently saw Dr. Werner from general surgery and underwent laparoscopic biopsy of the mass on 8/6/2024.  Patient also had umbilical hernia that was repaired.  Patient recovered from the surgery without any major complications.  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  Subsequent PET scan done on 8/16/24 showed hypermetabolic mesenteric mass correlating with the prior CT scan with SUV max of 11.  There are scattered hypermetabolic lymphadenopathy in the mesentery surrounding the mass as well as  mesenteric stranding. Patient does not have any major cytopenias on peripheral blood work as well as no renal or kidney abnormalities.      He was started on R-CHOP (C1D1=8/28/24).  10/4/24 pet s/p C2 demonstrated  no evidence of disease, metabolic CR. Subsequently he went on to finish cycle 6. End of treatment PET showed persistent CR. His abdominal symptoms resolved.    Interval history:   Pedrito presents to clinic for 3 months follow up. He finished 6 cycles of R-CHOP in December 2024 (last cycle received on 12/10/2024).  Since his last visit in January 2024, he is doing very well.  He is recovering from the effects of chemotherapy.  Recently went to [place] for vacation and at a time.  His mood is much better and he feels well overall.  He works at [employer].  He is physically active, runs on a treadmill and lifts weights.  Dyspnea, PND, cough, energy level is improving.  Appetite is good.  Weight is stable.    Denies fevers, night sweats, diarrhea, constipation, neuropathy, nausea, mouth sores, rashes/sores, new pains, new lumps/bumps, all other acute issues or concerns.    Current Outpatient Medications   Medication Sig Dispense Refill     escitalopram (LEXAPRO) 10 MG tablet Take 1 tablet (10 mg) by mouth daily. 60 tablet 1       Physical Exam:  /77 (BP Location: Right arm, Patient Position: Sitting, Cuff Size: Adult Regular)   Pulse 63   Temp 98.1  F (36.7  C) (Oral)   Resp 18   Wt 88.3 kg (194 lb 9.6 oz)   SpO2 98%   BMI 28.99 kg/m    General: No acute distress, pleasant, well-groomed    HEENT: EOMI, PERRL. Sclerae are anicteric. Oral exam deferred  Lymph: Neck is supple with no lymphadenopathy in the cervical, supraclavicular, or axillary areas.   Heart: Regular rate and rhythm.   Lungs: Clear to auscultation bilaterally.   Extremities: Trace bilateral pedal edema.  Neuro: Alert and oriented x3, CN grossly intact, steady gait  Skin: No rashes, petechiae, or bruising noted on exposed  skin.    Labs: I personally reviewed labs mentioned below  Most Recent 3 CBC's:  Recent Labs   Lab Test 04/14/25  1548 01/13/25  1646 12/10/24  0923   WBC 5.3 4.6 2.3*   HGB 14.0 12.6* 12.0*   MCV 89 91 90    196 236   ANEUTAUTO 3.8 3.0 1.3*     Most Recent 3 BMP's:  Recent Labs   Lab Test 04/14/25  1548 01/13/25  1646 12/10/24  0923    140 138   POTASSIUM 3.9 3.6 4.1   CHLORIDE 104 104 104   CO2 22 23 24   BUN 16.4 14.3 17.5   CR 1.07 0.94 0.95   ANIONGAP 12 13 10   NEY 9.5 9.5 9.4   GLC 96 104* 112*   PROTTOTAL 6.7 6.7 6.4   ALBUMIN 4.5 4.5 4.3    Most Recent 3 LFT's:  Recent Labs   Lab Test 04/14/25  1548 01/13/25  1646 12/10/24  0923   AST 29 37 47*   ALT 20 51 49   ALKPHOS 120 91 82   BILITOTAL 0.3 0.3 0.3    Most Recent 2 TSH and T4:  Recent Labs   Lab Test 06/21/24  0853   TSH 2.17       Impression/plan:     1) Nodular lymphocyte predominant Hodgkin lymphoma:  - He has completed therapy with 6 cycles of R-CHOP, achieved CR.  We will continue surveillance with clinical follow-up every 3 months with labs and CT imaging every 6 months for 2 years and less frequently thereafter.      2) Depressed Mood:   - Doing much better.  Continue Lexapro 10 program daily    Discussed with Dr. Aly Pal MD MS  Hematology fellow, PGY5  Pager: [phone]    I saw and examined the patient with fellow. I discussed assessment and plan. I agree with findings documented in fellows note.    Total time spent on date of service in review of medical records, review of labs, history taking, physical exam, discussion of assessment and plan, counseling and patient education is 20 minutes.    Lalito Lu MD  Attending Physician  Pager [phone]      Again, thank you for allowing me to participate in the care of your patient.        Sincerely,        Lalito Lu MD    Electronically signed

## 2025-04-14 NOTE — NURSING NOTE
"Oncology Rooming Note    April 14, 2025 4:16 PM   Pedrito Jenkins is a 54 year old male who presents for:    Chief Complaint   Patient presents with    Blood Draw     Labs drawn via  by RN in lab, vitals taken.     Oncology Clinic Visit     Mediastinal mass, Hodgkin's lymphoma     Initial Vitals: /77 (BP Location: Right arm, Patient Position: Sitting, Cuff Size: Adult Regular)   Pulse 63   Temp 98.1  F (36.7  C) (Oral)   Resp 18   Wt 88.3 kg (194 lb 9.6 oz)   SpO2 98%   BMI 28.99 kg/m   Estimated body mass index is 28.99 kg/m  as calculated from the following:    Height as of 1/29/25: 1.745 m (5' 8.7\").    Weight as of this encounter: 88.3 kg (194 lb 9.6 oz). Body surface area is 2.07 meters squared.  No Pain (0) Comment: Data Unavailable   No LMP for male patient.  Allergies reviewed: Yes  Medications reviewed: Yes    Medications: Medication refills not needed today.  Pharmacy name entered into [facility]: [id number] - [place], [place] - [address] [place] [address]    Frailty Screening:   Is the patient here for a new oncology consult visit in cancer care? 2. No    PHQ9:  Did this patient require a PHQ9?: No      Clinical concerns: none       Tristin Hart              "

## 2025-04-16 NOTE — RESULT ENCOUNTER NOTE
Cyndie Major,    If you have not viewed these results on [employer] within 3 days, we will use an alternative method to contact you. We will contact you via the following protocol:    - Via letter if your results are normal.  - Via phone ([phone]) if your results are abnormal.     Here are my comments about your recent results:    Lipids - Your cholesterol lab results were essentially normal.    A1c - Your 3 month blood sugar average was normal.    PSA - Your Prostate cancer screening lab results were normal.    Please call the clinic ([phone]), or message us on [employer] with any questions you may have.     Have a great day,    Dr. Saucedo

## 2025-05-23 DIAGNOSIS — F43.21 ADJUSTMENT DISORDER WITH DEPRESSED MOOD: ICD-10-CM

## 2025-05-23 NOTE — TELEPHONE ENCOUNTER
"Escitalopram 10mg tab  Last prescribing provider: Cherrie Govea    Last clinic visit date: 4/14/25 w/ Dr. Lu    Recommendations for requested medication (if none, N/A): 4/14/25, \"- Doing much better.  Continue Lexapro 10 program daily\"    Any other pertinent information (if none, N/A): fax request    Refilled: Y/N, if NO, why?    "

## 2025-05-27 RX ORDER — ESCITALOPRAM OXALATE 10 MG/1
10 TABLET ORAL DAILY
Qty: 60 TABLET | Refills: 1 | Status: SHIPPED | OUTPATIENT
Start: 2025-05-27

## 2025-06-05 ENCOUNTER — RESULTS FOLLOW-UP (OUTPATIENT)
Dept: FAMILY MEDICINE | Facility: CLINIC | Age: 55
End: 2025-06-05

## 2025-06-05 ENCOUNTER — OFFICE VISIT (OUTPATIENT)
Dept: FAMILY MEDICINE | Facility: CLINIC | Age: 55
End: 2025-06-05
Payer: COMMERCIAL

## 2025-06-05 VITALS
DIASTOLIC BLOOD PRESSURE: 76 MMHG | BODY MASS INDEX: 28.2 KG/M2 | SYSTOLIC BLOOD PRESSURE: 110 MMHG | RESPIRATION RATE: 14 BRPM | OXYGEN SATURATION: 99 % | HEIGHT: 70 IN | WEIGHT: 197 LBS | TEMPERATURE: 97.1 F | HEART RATE: 68 BPM

## 2025-06-05 DIAGNOSIS — C81.03 NODULAR LYMPHOCYTE PREDOMINANT HODGKIN LYMPHOMA OF INTRA-ABDOMINAL LYMPH NODES (H): ICD-10-CM

## 2025-06-05 DIAGNOSIS — R11.0 NAUSEA: ICD-10-CM

## 2025-06-05 DIAGNOSIS — R05.8 DRY COUGH: Primary | ICD-10-CM

## 2025-06-05 DIAGNOSIS — R53.83 OTHER FATIGUE: ICD-10-CM

## 2025-06-05 LAB
BASOPHILS # BLD AUTO: 0 10E3/UL (ref 0–0.2)
BASOPHILS NFR BLD AUTO: 1 %
EOSINOPHIL # BLD AUTO: 0.1 10E3/UL (ref 0–0.7)
EOSINOPHIL NFR BLD AUTO: 3 %
ERYTHROCYTE [DISTWIDTH] IN BLOOD BY AUTOMATED COUNT: 12.3 % (ref 10–15)
HCT VFR BLD AUTO: 41.4 % (ref 40–53)
HGB BLD-MCNC: 14.8 G/DL (ref 13.3–17.7)
IMM GRANULOCYTES # BLD: 0 10E3/UL
IMM GRANULOCYTES NFR BLD: 0 %
LYMPHOCYTES # BLD AUTO: 0.6 10E3/UL (ref 0.8–5.3)
LYMPHOCYTES NFR BLD AUTO: 14 %
MCH RBC QN AUTO: 31.4 PG (ref 26.5–33)
MCHC RBC AUTO-ENTMCNC: 35.7 G/DL (ref 31.5–36.5)
MCV RBC AUTO: 88 FL (ref 78–100)
MONOCYTES # BLD AUTO: 0.6 10E3/UL (ref 0–1.3)
MONOCYTES NFR BLD AUTO: 13 %
NEUTROPHILS # BLD AUTO: 3.1 10E3/UL (ref 1.6–8.3)
NEUTROPHILS NFR BLD AUTO: 69 %
PLATELET # BLD AUTO: 223 10E3/UL (ref 150–450)
RBC # BLD AUTO: 4.71 10E6/UL (ref 4.4–5.9)
WBC # BLD AUTO: 4.5 10E3/UL (ref 4–11)

## 2025-06-05 RX ORDER — ONDANSETRON 4 MG/1
4 TABLET, ORALLY DISINTEGRATING ORAL EVERY 8 HOURS PRN
Qty: 30 TABLET | Refills: 0 | Status: SHIPPED | OUTPATIENT
Start: 2025-06-05

## 2025-06-05 RX ORDER — BENZONATATE 100 MG/1
100-200 CAPSULE ORAL 3 TIMES DAILY PRN
Qty: 90 CAPSULE | Refills: 0 | Status: SHIPPED | OUTPATIENT
Start: 2025-06-05

## 2025-06-05 ASSESSMENT — ENCOUNTER SYMPTOMS: NAUSEA: 1

## 2025-06-05 ASSESSMENT — PAIN SCALES - GENERAL: PAINLEVEL_OUTOF10: NO PAIN (0)

## 2025-06-05 NOTE — PATIENT INSTRUCTIONS
Important Takeaway Points From This Visit:     Please call [phone] to schedule your imaging study.       Regarding your Cough:  There are many causes for a prolonged cough.   The most common are:  Post nasal drip (phlegm leaking down the back of your throat from your sinuses)  Allergies  Heartburn/reflux (acid coming back up and spilling into your airway)  Irritants such as tobacco smoke, or chemicals at work  Asthma  COPD   Certain medications such as ACE inhibitors (the most commonly used in this area is lisinopril).    To start, we usually try over the counter medications for 1 month first  Nasal Flonase (fluticasone) spray, once each nostril daily for post nasal drip  Acid blocker (Prilosec [Omeprazole] once daily, or Pepcid [Famotidine] twice daily)    If your symptoms do not improve within 1 month we will check a chest xray and lung function tests. Message me if not improved and I will order these for you without the need for another visit. These are more expensive/invasive tests and therefore we usually wait until after the trial of medications above for this.

## 2025-06-05 NOTE — PROGRESS NOTES
Assessment & Plan   1. Dry cough (Primary)  1 month of cough.  Patient does have travel within the last 2 months to [place], history of lymphoma as listed below with upcoming surveillance follow-up previously presenting with abdominal symptoms.  Did trial PPI but this upset his stomach further.  Recommend continued trial of PPI for most common cause of dry cough which includes GERD.  Also recommend over-the-counter Flonase.  Recommend over-the-counter Delsym and Tessalon prescription for symptomatic control in the meantime.  Given the lymphoma history and to look into infection further will order an early CT chest abdomen and pelvis for further workup.  If cannot get in in a timely fashion within the next week for this we will order a chest x-ray instead for short-term evaluation of his cough lasting greater than 1 month.  - CT Chest/Abdomen/Pelvis w Contrast; Future  - Quantiferon-TB Gold Plus; Future  - Quantiferon-TB Gold Plus  - benzonatate (TESSALON) 100 MG capsule; Take 1-2 capsules (100-200 mg) by mouth 3 times daily as needed for cough.  Dispense: 90 capsule; Refill: 0    2. Nausea  Unclear cause.  Treat symptoms with Zofran.  Consider Reglan if not improving.  Having slightly looser stools.  Given prior abdominal symptoms as the original complaint for his lymphoma will order his surveillance CT scan yearly as well as check thyroid, CMP, etc.    - CBC with platelets and differential; Future  - TSH with free T4 reflex; Future  - Comprehensive metabolic panel (BMP + Alb, Alk Phos, ALT, AST, Total. Bili, TP); Future  - Magnesium; Future  - ondansetron (ZOFRAN ODT) 4 MG ODT tab; Take 1 tablet (4 mg) by mouth every 8 hours as needed for nausea.  Dispense: 30 tablet; Refill: 0  - Lipase; Future  - CT Chest/Abdomen/Pelvis w Contrast; Future  - CBC with platelets and differential  - TSH with free T4 reflex  - Comprehensive metabolic panel (BMP + Alb, Alk Phos, ALT, AST, Total. Bili, TP)  - Magnesium  - Lipase    3.  Other fatigue  Unclear cause.  Possibly related to underlying illness above.  If no discernible anemia, hypothyroidism, intra-abdominal pathology etc. consider sleep study.    - CBC with platelets and differential; Future  - TSH with free T4 reflex; Future  - Comprehensive metabolic panel (BMP + Alb, Alk Phos, ALT, AST, Total. Bili, TP); Future  - Magnesium; Future  - Lipase; Future  - CT Chest/Abdomen/Pelvis w Contrast; Future  - CBC with platelets and differential  - TSH with free T4 reflex  - Comprehensive metabolic panel (BMP + Alb, Alk Phos, ALT, AST, Total. Bili, TP)  - Magnesium  - Lipase    4. 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  - CT Chest/Abdomen/Pelvis w Contrast; Future      Migue Martinez MD  [facility]    Disclaimer: This note consists of symbols derived from keyboarding, dictation and/or voice recognition software. As a result, there may be errors in the script that have gone undetected. Please consider this when interpreting information found in this chart.    The longitudinal plan of care for the diagnosis(es)/condition(s) as documented were addressed during this visit. Due to the added complexity in care, I will continue to support Pedrito in the subsequent management and with ongoing continuity of care.    Subjective   Pedrito is a 54 year old, presenting for the following health issues:  Nausea        6/5/2025     1:46 PM   Additional Questions   Roomed by Clara   Accompanied by Self     Nausea  Associated symptoms include nausea.   History of Present Illness       Reason for visit:  Nausea, exhaustion  Symptom onset:  3-4 weeks ago  Symptoms include:  Nausea, sore throat, tired all the time  Symptom intensity:  Moderate  Symptom progression:  Worsening  Had these symptoms  "before:  No  What makes it worse:  Eating         Symptoms started with cough and congestion. Tried omeprazole as well. Did not feel well after taking this.    No fevers. No body aches, fevers. Feels like he could take a nap any time of day. Started in the last month as well.     Went to [place] in April.     Patient with history of nodular lymphocyte predominant Hodgkin lymphoma.  Scheduled for 6-month follow-up CT scan next month for surveillance.      Review of Systems  Constitutional, HEENT, cardiovascular, pulmonary, GI, , musculoskeletal, neuro, skin, endocrine and psych systems are negative, except as otherwise noted.      Objective    /76   Pulse 68   Temp 97.1  F (36.2  C) (Temporal)   Resp 14   Ht 1.769 m (5' 9.65\")   Wt 89.4 kg (197 lb)   SpO2 99%   BMI 28.55 kg/m    Body mass index is 28.55 kg/m .  Physical Exam  Vitals reviewed.   HENT:      Head: Normocephalic and atraumatic.      Right Ear: Tympanic membrane, ear canal and external ear normal. No laceration, drainage or tenderness. No middle ear effusion. There is no impacted cerumen. No foreign body. No mastoid tenderness. No hemotympanum. Tympanic membrane is not injected, perforated or erythematous.      Left Ear: Tympanic membrane, ear canal and external ear normal. No laceration, drainage or tenderness.  No middle ear effusion. There is no impacted cerumen. No foreign body. No mastoid tenderness. No hemotympanum. Tympanic membrane is not injected, perforated or erythematous.      Nose: Nose normal. No congestion or rhinorrhea.      Mouth/Throat:      Mouth: Mucous membranes are moist.      Pharynx: Oropharynx is clear. No oropharyngeal exudate or posterior oropharyngeal erythema.   Eyes:      General:         Right eye: No discharge.         Left eye: No discharge.      Extraocular Movements: Extraocular movements intact.      Conjunctiva/sclera: Conjunctivae normal.      Pupils: Pupils are equal, round, and reactive to light. "   Cardiovascular:      Rate and Rhythm: Normal rate and regular rhythm.      Heart sounds: Normal heart sounds. No murmur heard.     No friction rub.   Pulmonary:      Effort: Pulmonary effort is normal. No respiratory distress.      Breath sounds: Normal breath sounds. No wheezing or rhonchi.   Musculoskeletal:         General: No swelling.      Cervical back: Normal range of motion and neck supple. No tenderness.   Lymphadenopathy:      Cervical: No cervical adenopathy.   Skin:     General: Skin is warm.      Findings: No rash.   Neurological:      General: No focal deficit present.      Mental Status: He is alert.      Motor: No weakness.      Coordination: Coordination normal.      Gait: Gait normal.   Psychiatric:         Mood and Affect: Mood normal.         Behavior: Behavior normal.         Thought Content: Thought content normal.         Judgment: Judgment normal.        Labs: Pending        Signed Electronically by: Migue Martinez MD

## 2025-06-07 LAB
GAMMA INTERFERON BACKGROUND BLD IA-ACNC: 0.03 IU/ML
M TB IFN-G BLD-IMP: NEGATIVE
M TB IFN-G CD4+ BCKGRND COR BLD-ACNC: 9.97 IU/ML
MITOGEN IGNF BCKGRD COR BLD-ACNC: 0.01 IU/ML
MITOGEN IGNF BCKGRD COR BLD-ACNC: 0.02 IU/ML
QUANTIFERON MITOGEN: 10 IU/ML
QUANTIFERON NIL TUBE: 0.03 IU/ML
QUANTIFERON TB1 TUBE: 0.04 IU/ML
QUANTIFERON TB2 TUBE: 0.05

## 2025-06-18 ENCOUNTER — HOSPITAL ENCOUNTER (OUTPATIENT)
Dept: CT IMAGING | Facility: HOSPITAL | Age: 55
Discharge: HOME | End: 2025-06-18
Attending: FAMILY MEDICINE
Payer: COMMERCIAL

## 2025-06-18 DIAGNOSIS — R53.83 OTHER FATIGUE: ICD-10-CM

## 2025-06-18 DIAGNOSIS — R11.0 NAUSEA: ICD-10-CM

## 2025-06-18 DIAGNOSIS — R05.8 DRY COUGH: ICD-10-CM

## 2025-06-18 DIAGNOSIS — C81.03 NODULAR LYMPHOCYTE PREDOMINANT HODGKIN LYMPHOMA OF INTRA-ABDOMINAL LYMPH NODES (H): ICD-10-CM

## 2025-06-18 PROCEDURE — 74177 CT ABD & PELVIS W/CONTRAST: CPT

## 2025-06-18 PROCEDURE — 250N000011 HC RX IP 250 OP 636: Performed by: FAMILY MEDICINE

## 2025-06-18 RX ORDER — IOPAMIDOL 755 MG/ML
75 INJECTION, SOLUTION INTRAVASCULAR ONCE
Status: COMPLETED | OUTPATIENT
Start: 2025-06-18 | End: 2025-06-18

## 2025-06-18 RX ADMIN — IOPAMIDOL 75 ML: 755 INJECTION, SOLUTION INTRAVENOUS at 14:27

## 2025-06-20 ENCOUNTER — MYC MEDICAL ADVICE (OUTPATIENT)
Dept: FAMILY MEDICINE | Facility: CLINIC | Age: 55
End: 2025-06-20
Payer: COMMERCIAL

## 2025-06-20 DIAGNOSIS — R53.83 OTHER FATIGUE: Primary | ICD-10-CM

## 2025-06-20 DIAGNOSIS — R11.0 NAUSEA: ICD-10-CM

## 2025-06-30 ENCOUNTER — MYC MEDICAL ADVICE (OUTPATIENT)
Dept: ONCOLOGY | Facility: CLINIC | Age: 55
End: 2025-06-30
Payer: COMMERCIAL

## 2025-06-30 ENCOUNTER — TELEPHONE (OUTPATIENT)
Dept: GASTROENTEROLOGY | Facility: CLINIC | Age: 55
End: 2025-06-30
Payer: COMMERCIAL

## 2025-06-30 NOTE — TELEPHONE ENCOUNTER
Pre visit planning completed.      Procedure details:    Patient scheduled for Upper endoscopy (EGD) on 7/14/25.     Approximate arrival time: 1155. Procedure time 1240.   *Ensure patient is aware that endoscopy team will be calling about 2 days prior to procedure date to confirm arrival time as this may change.     Facility location: [facility]; [address], [place] [address]. Check in location: 2nd Floor at Surgery desk.  *Disclaimer: Drivers are to check in with patient and stay on campus during procedure.     Sedation type: Conscious sedation     Pre op exam needed? No.    Indication for procedure: nausea       Chart review:     Electronic implanted devices? No    Recent diagnosis of diverticulitis within the last 6 weeks? No      Medication review:    Diabetic? No    Anticoagulants? No    Weight loss medication/injectable? No GLP-1 medication per patient's medication list. Nursing to verify with pre-assessment call.    Other medication HOLDING recommendations:  N/A      Prep for procedure:     Bowel prep recommendation: N/A  Due to: EGD     Procedure information and instructions sent via [place]         Celi Cooley RN  Endoscopy Procedure Pre Assessment   [phone] option 3

## 2025-06-30 NOTE — TELEPHONE ENCOUNTER
"Endoscopy Scheduling Screen    Caller: patient    Have you had any respiratory illness or flu-like symptoms in the last 10 days?  No    Patient is ACTIVE on [employer].  Inform patient that all appointment instructions will be sent via [employer].    Review patient's insurance for any non participating payor.    Ordering/Referring Provider: REBECCA MAHONEY    (If ordering provider performs procedure, schedule with ordering provider unless otherwise instructed. )    BMI: Estimated body mass index is 28.55 kg/m  as calculated from the following:    Height as of 6/5/25: 1.769 m (5' 9.65\").    Weight as of 6/5/25: 89.4 kg (197 lb).     Sedation Ordered  moderate sedation.   If patient BMI > 50 do not schedule in [facility].    If patient [facility] > 45 [facility]    Are you taking methadone or Suboxone?  NO, No RN review required.    Have you been diagnosed and are being treated for severe PTSD or severe anxiety?  NO, No RN review required.    Are you taking any prescription medications for pain 3 or more times per week?   NO, No RN review required.    Do you have a history of malignant hyperthermia?  No    (Females) Are you currently pregnant?   No     Have you been diagnosed or told you have pulmonary hypertension?   No    Do you have an LVAD?  No    Have you been told you have moderate to severe sleep apnea?  No.    Have you been told you have COPD, asthma, or any other lung disease?  No    Has your doctor ordered any cardiac tests like echo, angiogram, stress test, ablation, or EKG, that you have not completed yet?  No    Do you  have a history of any heart conditions?  No     Have you ever had or are you waiting for an organ transplant?  No. Continue scheduling, no site restrictions.    Have you had a stroke or transient ischemic attack (TIA aka \"mini stroke\") in the last 2 years?   No.    Have you been diagnosed with or been told you have cirrhosis of the liver?   No.    Are you currently on dialysis?   No    Do " "you need assistance transferring?   No    BMI: Estimated body mass index is 28.55 kg/m  as calculated from the following:    Height as of 6/5/25: 1.769 m (5' 9.65\").    Weight as of 6/5/25: 89.4 kg (197 lb).     Is patients BMI > 40 and scheduling location [facility]?  No    Do you take an injectable or oral medication for weight loss or diabetes (excluding insulin)?  No    Do you take the medication Naltrexone?  No    Do you take blood thinners?  No       Prep   Are you currently on dialysis or do you have chronic kidney disease?  No    Do you have a diagnosis of diabetes?  No    Do you have a diagnosis of cystic fibrosis (CF)?  No    On a regular basis do you go 3 -5 days between bowel movements?  No    BMI > 40?  No    Preferred Pharmacy:    [id number] - [place] - [id number]  [id number] [address]  Phone: [phone] Fax: [phone]      Final Scheduling Details     Procedure scheduled  Upper endoscopy (EGD)    Surgeon:  RESHMA     Date of procedure:  7/14/25     Pre-OP / PAC:   No - Not required for this site.    Location  [facility] - [facility] - Patient preference.    Sedation   Moderate Sedation - Per order.      Patient Reminders:   You will receive a call from a Nurse to review instructions and health history.  This assessment must be completed prior to your procedure.  Failure to complete the Nurse assessment may result in the procedure being cancelled.      On the day of your procedure, please designate an adult(s) who can drive you home stay with you for the next 24 hours. The medicines used in the exam will make you sleepy. You will not be able to drive.      You cannot take public transportation, ride share services, or non-medical taxi service without a responsible caregiver.  Medical transport services are allowed with the requirement that a responsible caregiver will receive you at your destination.  We require that drivers and caregivers are confirmed prior to your procedure.  "

## 2025-06-30 NOTE — TELEPHONE ENCOUNTER
Pre assessment completed for upcoming procedure.   (Please see previous telephone encounter notes for complete details)    Procedure details:    Procedure date 07/14/25, approximate arrival time 1155 and facility location reviewed.   Patient is aware that endoscopy team will be calling about 2 days prior to confirm arrival time.    Designated  policy reviewed and that site requests drivers to check in and stay on campus. Instructed to have someone stay 6  hours post procedure.   *Disclaimer - please notify the [facility] RN GI staff with any  issues/concerns.     Medication review:    Medications reviewed. Please see supporting documentation below. Holding recommendations discussed (if applicable).       Prep for procedure:     Procedure prep instructions reviewed.        Any additional information needed:  N/A      Patient verbalized understanding and had no questions or concerns at this time.      Celi Cooley RN  Endoscopy Procedure Pre Assessment   [phone] option 3

## 2025-07-10 ENCOUNTER — TELEPHONE (OUTPATIENT)
Dept: GASTROENTEROLOGY | Facility: CLINIC | Age: 55
End: 2025-07-10
Payer: COMMERCIAL

## 2025-07-10 NOTE — TELEPHONE ENCOUNTER
Left voicemail of arrival time of 11:30 AM.     [employer] message sent with updated arrival time.

## 2025-07-13 RX ORDER — LIDOCAINE 40 MG/G
CREAM TOPICAL
Status: CANCELLED | OUTPATIENT
Start: 2025-07-13

## 2025-07-13 RX ORDER — ONDANSETRON 2 MG/ML
4 INJECTION INTRAMUSCULAR; INTRAVENOUS
Status: CANCELLED | OUTPATIENT
Start: 2025-07-13

## 2025-07-14 ENCOUNTER — HOSPITAL ENCOUNTER (OUTPATIENT)
Facility: AMBULATORY SURGERY CENTER | Age: 55
Discharge: HOME | End: 2025-07-14
Attending: INTERNAL MEDICINE
Payer: COMMERCIAL

## 2025-07-14 VITALS
HEART RATE: 57 BPM | DIASTOLIC BLOOD PRESSURE: 78 MMHG | SYSTOLIC BLOOD PRESSURE: 113 MMHG | TEMPERATURE: 97 F | RESPIRATION RATE: 16 BRPM | OXYGEN SATURATION: 96 %

## 2025-07-14 LAB — UPPER GI ENDOSCOPY: NORMAL

## 2025-07-14 RX ORDER — NALOXONE HYDROCHLORIDE 0.4 MG/ML
0.2 INJECTION, SOLUTION INTRAMUSCULAR; INTRAVENOUS; SUBCUTANEOUS
Status: DISCONTINUED | OUTPATIENT
Start: 2025-07-14 | End: 2025-07-15 | Stop reason: HOSPADM

## 2025-07-14 RX ORDER — PROCHLORPERAZINE MALEATE 10 MG
10 TABLET ORAL EVERY 6 HOURS PRN
Status: DISCONTINUED | OUTPATIENT
Start: 2025-07-14 | End: 2025-07-15 | Stop reason: HOSPADM

## 2025-07-14 RX ORDER — ONDANSETRON 4 MG/1
4 TABLET, ORALLY DISINTEGRATING ORAL EVERY 6 HOURS PRN
Status: DISCONTINUED | OUTPATIENT
Start: 2025-07-14 | End: 2025-07-15 | Stop reason: HOSPADM

## 2025-07-14 RX ORDER — ONDANSETRON 2 MG/ML
4 INJECTION INTRAMUSCULAR; INTRAVENOUS EVERY 6 HOURS PRN
Status: DISCONTINUED | OUTPATIENT
Start: 2025-07-14 | End: 2025-07-15 | Stop reason: HOSPADM

## 2025-07-14 RX ORDER — NALOXONE HYDROCHLORIDE 0.4 MG/ML
0.4 INJECTION, SOLUTION INTRAMUSCULAR; INTRAVENOUS; SUBCUTANEOUS
Status: DISCONTINUED | OUTPATIENT
Start: 2025-07-14 | End: 2025-07-15 | Stop reason: HOSPADM

## 2025-07-14 RX ORDER — FENTANYL CITRATE 50 UG/ML
INJECTION, SOLUTION INTRAMUSCULAR; INTRAVENOUS PRN
Status: DISCONTINUED | OUTPATIENT
Start: 2025-07-14 | End: 2025-07-14 | Stop reason: HOSPADM

## 2025-07-14 RX ORDER — FLUMAZENIL 0.1 MG/ML
0.2 INJECTION, SOLUTION INTRAVENOUS
Status: DISCONTINUED | OUTPATIENT
Start: 2025-07-14 | End: 2025-07-15 | Stop reason: HOSPADM

## 2025-07-14 NOTE — H&P
ENDOSCOPY PRE-SEDATION H&P FOR OUTPATIENT PROCEDURES    Pedrito Jenkins  [id number]  1970    Procedure: EGD    Pre-procedure diagnosis: nausea, early satiefy    Past medical history:   Past Medical History:   Diagnosis Date    Cancer (H) 8/1/2024    Cancer free as of 1/13/3035    NO ACTIVE PROBLEMS        Past surgical history:   Past Surgical History:   Procedure Laterality Date    BIOPSY  8/16/2024    COLONOSCOPY  4/18/23    COLONOSCOPY WITH CO2 INSUFFLATION N/A 04/18/2023    Procedure: COLONOSCOPY, WITH CO2 INSUFFLATION;  Surgeon: Terrence Cole MD;  Location: [facility] OR    LAPAROSCOPIC BIOPSY (GENERIC) N/A 08/06/2024    Procedure: LAPAROSCOPIC,  ABDOMINAL BIOPSY, ASPIRATION OF PERITONEAL FLUID, UMBILICAL HERNIA REPAIR;  Surgeon: Charly Werner MD;  Location: [facility] OR    VASECTOMY  2010    Dr. Narayanan       Current Outpatient Medications   Medication Sig Dispense Refill    benzonatate (TESSALON) 100 MG capsule Take 1-2 capsules (100-200 mg) by mouth 3 times daily as needed for cough. 90 capsule 0    ondansetron (ZOFRAN ODT) 4 MG ODT tab Take 1 tablet (4 mg) by mouth every 8 hours as needed for nausea. 30 tablet 0     No current facility-administered medications for this encounter.       No Known Allergies    History of Anesthesia/Sedation Problems: no    PHYSICAL EXAMINATION:  Constitutional: aaox3, cooperative, pleasant  Vitals reviewed: /74   Pulse 55   Temp 97  F (36.1  C) (Temporal)   Resp 16   SpO2 97%   Wt:   Wt Readings from Last 2 Encounters:   06/05/25 89.4 kg (197 lb)   04/14/25 88.3 kg (194 lb 9.6 oz)      Eyes: Sclera anicteric/injected  Ears/nose/mouth/throat: Normal oropharynx without ulcers or exudate, mucus membranes moist, hearing intact  Neck: supple, thyroid normal size  CV: No edema  Respiratory: Unlabored breathing  Lymph: No submandibular, supraclavicular or inguinal lymphadenopathy  Abd: Nondistended, no masses, nontender  Skin: warm, perfused, no jaundice  Psych: Normal  affect  MSK: normal movement on limited exam.    ASA Score: See Provation note    Assessment/Plan:     The patient is an appropriate candidate to receive sedation.    Informed consent was discussed with the patient/family, including the risks, benefits, potential complications and any alternative options associated with sedation.    Patient assessment completed just prior to sedation and while under constant observation by the provider. Condition determined to be adequate for proceeding with sedation.    The specific risks for the procedure were discussed with the patient at the time of informed consent and include but are not limited to perforation which could require surgery, missing significant neoplasm or lesion, hemorrhage and adverse sedative complication.      Jalen Villagran MD

## 2025-07-17 LAB
PATH REPORT.COMMENTS IMP SPEC: NORMAL
PATH REPORT.COMMENTS IMP SPEC: NORMAL
PATH REPORT.FINAL DX SPEC: NORMAL
PATH REPORT.GROSS SPEC: NORMAL
PATH REPORT.MICROSCOPIC SPEC OTHER STN: NORMAL
PATH REPORT.RELEVANT HX SPEC: NORMAL
PHOTO IMAGE: NORMAL

## 2025-07-21 ENCOUNTER — VIRTUAL VISIT (OUTPATIENT)
Dept: ONCOLOGY | Facility: CLINIC | Age: 55
End: 2025-07-21
Attending: STUDENT IN AN ORGANIZED HEALTH CARE EDUCATION/TRAINING PROGRAM
Payer: COMMERCIAL

## 2025-07-21 VITALS — BODY MASS INDEX: 27.92 KG/M2 | HEIGHT: 70 IN | WEIGHT: 195 LBS

## 2025-07-21 DIAGNOSIS — C81.00 NODULAR LYMPHOCYTE PREDOMINANT HODGKIN LYMPHOMA, UNSPECIFIED BODY REGION (H): Primary | ICD-10-CM

## 2025-07-21 ASSESSMENT — PAIN SCALES - GENERAL: PAINLEVEL_OUTOF10: NO PAIN (0)

## 2025-07-21 NOTE — PROGRESS NOTES
Virtual Visit Details    Type of service:  Video Visit     Originating Location (pt. Location): Home    Distant Location (provider location):  On-site  Platform used for Video Visit: Talib    CC: Pedrito is a 55 y/o male coming in for follow up of nodular lymphocyte predominant Hodgkin lymphoma.     HPI:    Oncology History Overview Note   This is a 53-year-old man coming in for initial consultation of newly diagnosed nodular lymphocyte predominant Hodgkin lymphoma.    Patient had been in his usual state of health until June 2024 when he developed gradually progressive nausea and mild abdominal discomfort.  He also had some loose stools but not watery diarrhea.  The symptoms led to medical attention.  Initially an exhaustive workup for gastrointestinal pathology was done including IgA levels in the blood, enteric bacterial panel calprotectin and Helicobacter pylori in the stool, blood lipase levels, blood tissue transglutaminase levels, TSH, CRP, ESR as well as vitamin D and vitamin B12 levels.  Vitamin B12 and vitamin D levels were within normal limits.  Erythrocyte sedimentation rate was also within normal limits CRP was also within normal limits TSH was normal as well tissue transglutaminase antibodies were negative lipase levels were within normal limits, enteric bacterial panel was positive for enterotoxigenic E. coli, calprotectin was negative, H. pylori stool antigen was negative as well.  He was given an antibiotic for 1 dose and that did not resolve his symptoms.  He does not remember the name of it antibiotic.    A CT scan was done as well to further investigate patient's nausea and abdominal pain.  That showed 5 cm x 4.7 cm x 5.3 cm enhancing soft tissue mass in the mesentery and there are scattered mesenteric lymphadenopathy surrounding this lesion and mesenteric stranding and haziness.  He subsequently saw Dr. Werner from general surgery and underwent laparoscopic biopsy of the mass on 6 August 2024.   Patient also had umbilical hernia that was repaired.  Patient recovered from the surgery without any major complications.  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  Subsequent PET scan done on 16th of August showed hypermetabolic mesenteric mass correlating with the prior CT scan with SUV max of 11.  There are scattered hypermetabolic lymphadenopathy in the mesentery surrounding the mass as well as mesenteric stranding. Patient does not have any major cytopenias on peripheral blood work as well as no renal or kidney abnormalities.      Nausea has resolved after surgery but patient still have very mild abdominal discomfort.  No high-grade fevers drenching night sweats.  Patient does feel fatigued.  Appetite is good.  ECOG performance status is 0-1.    He received 2 cycles of RCHOP. He tolerated it well. PET scan in 10/2024 shows no evidence of disease, metabolic CR.    Subsequently he went on to finish cycle 6. End of treatment PET showed persistent CR. His abdominal symptoms resolved.     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 No fevers, chills, night sweats or weight loss, no lymphadenopathy. No pain. He has been having bloating,  nausea for some time so he got earlier CT scan. He also underwent upper GI endoscopy. Biopsy showed reactive gastropathy    Labs: I personally reviewed complete blood count, differential, renal function test, liver function tests LDH.  No cytopenias, LFTs within normal limits, renal function test within normal limits and LDH is normal as well.    Imaging: I personally reviewed CT chest/abdomen/pelvis and  discussed with the patient., no concern for progression/recurrence of lymphoma.    Assessment and Plan:    1) nodular lymphocyte predominant Hodgkin lymphoma:  - He does not have any evidence of lymphoma recurrence or progression.  - return in 3 months with labs.    2) Reactive gastropathy and changes of clay's esophagus;  - I suggested Pepcid daily PO and h.pylori stool antigen. Patient will discuss these with his PCP if his symptoms persist.    Total time spent on date of service in review of medical records, review of labs, history taking, physical exam, discussion of assessment and plan, counseling and patient education is 30 minutes.    Lalito Lu MD  Attending Physician  Pager [phone]

## 2025-07-21 NOTE — NURSING NOTE
Current patient location: [id number] [place] [address] [address]  [facility] [address]    Is the patient currently in the state of [place]? YES    Visit mode: VIDEO    If the visit is dropped, the patient can be reconnected by:dior    Will anyone else be joining the visit? NO  (If patient encounters technical issues they should call [phone])    Are changes needed to the allergy or medication list? No    Are refills needed on medications prescribed by this physician? NO    Rooming Documentation:  Questionnaire(s) not pre-assigned    Reason for visit: RECHECK    Isabel PARADAF

## 2025-07-21 NOTE — LETTER
7/21/2025      Pedrito Jenkins  [address]  [facility] [address]      Dear Colleague,    Thank you for referring your patient, Pedrito Jenkins, to the [facility]. Please see a copy of my visit note below.    Virtual Visit Details    Type of service:  Video Visit     Originating Location (pt. Location): Home    Distant Location (provider location):  On-site  Platform used for Video Visit: Talib    CC: Pedrito is a 53 y/o male coming in for follow up of nodular lymphocyte predominant Hodgkin lymphoma.     HPI:    Oncology History Overview Note   This is a 53-year-old man coming in for initial consultation of newly diagnosed nodular lymphocyte predominant Hodgkin lymphoma.    Patient had been in his usual state of health until June 2024 when he developed gradually progressive nausea and mild abdominal discomfort.  He also had some loose stools but not watery diarrhea.  The symptoms led to medical attention.  Initially an exhaustive workup for gastrointestinal pathology was done including IgA levels in the blood, enteric bacterial panel calprotectin and Helicobacter pylori in the stool, blood lipase levels, blood tissue transglutaminase levels, TSH, CRP, ESR as well as vitamin D and vitamin B12 levels.  Vitamin B12 and vitamin D levels were within normal limits.  Erythrocyte sedimentation rate was also within normal limits CRP was also within normal limits TSH was normal as well tissue transglutaminase antibodies were negative lipase levels were within normal limits, enteric bacterial panel was positive for enterotoxigenic E. coli, calprotectin was negative, H. pylori stool antigen was negative as well.  He was given an antibiotic for 1 dose and that did not resolve his symptoms.  He does not remember the name of it antibiotic.    A CT scan was done as well to further investigate patient's nausea and abdominal pain.  That showed 5 cm x 4.7 cm x 5.3 cm enhancing soft tissue mass in the  mesentery and there are scattered mesenteric lymphadenopathy surrounding this lesion and mesenteric stranding and haziness.  He subsequently saw Dr. Werner from general surgery and underwent laparoscopic biopsy of the mass on 6 August 2024.  Patient also had umbilical hernia that was repaired.  Patient recovered from the surgery without any major complications.  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  Subsequent PET scan done on 16th of August showed hypermetabolic mesenteric mass correlating with the prior CT scan with SUV max of 11.  There are scattered hypermetabolic lymphadenopathy in the mesentery surrounding the mass as well as mesenteric stranding. Patient does not have any major cytopenias on peripheral blood work as well as no renal or kidney abnormalities.      Nausea has resolved after surgery but patient still have very mild abdominal discomfort.  No high-grade fevers drenching night sweats.  Patient does feel fatigued.  Appetite is good.  ECOG performance status is 0-1.    He received 2 cycles of RCHOP. He tolerated it well. PET scan in 10/2024 shows no evidence of disease, metabolic CR.    Subsequently he went on to finish cycle 6. End of treatment PET showed persistent CR. His abdominal symptoms resolved.     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 No fevers, chills, night sweats or weight loss, no lymphadenopathy. No pain. He has been having bloating,  nausea for some time so he got earlier CT scan. He also underwent upper GI endoscopy. Biopsy showed reactive gastropathy    Labs: I personally reviewed complete blood  count, differential, renal function test, liver function tests LDH.  No cytopenias, LFTs within normal limits, renal function test within normal limits and LDH is normal as well.    Imaging: I personally reviewed CT chest/abdomen/pelvis and discussed with the patient., no concern for progression/recurrence of lymphoma.    Assessment and Plan:    1) nodular lymphocyte predominant Hodgkin lymphoma:  - He does not have any evidence of lymphoma recurrence or progression.  - return in 3 months with labs.    2) Reactive gastropathy and changes of clay's esophagus;  - I suggested Pepcid daily PO and h.pylori stool antigen. Patient will discuss these with his PCP if his symptoms persist.    Total time spent on date of service in review of medical records, review of labs, history taking, physical exam, discussion of assessment and plan, counseling and patient education is 30 minutes.    Lalito Lu MD  Attending Physician  Pager [phone]            Again, thank you for allowing me to participate in the care of your patient.        Sincerely,        Lalito Lu MD    Electronically signed

## (undated) DEVICE — SURGICEL HEMOSTAT 4X8" 1952

## (undated) DEVICE — SOL NACL 0.9% IRRIG 1000ML BOTTLE 07138-09

## (undated) DEVICE — NDL 25GA 1.5" 305127

## (undated) DEVICE — ENDO TROCAR FIRST ENTRY KII FIOS Z-THRD 05X100MM CTF03

## (undated) DEVICE — SUCTION MANIFOLD NEPTUNE 2 SYS 4 PORT 0702-020-000

## (undated) DEVICE — SU VICRYL+ 0 27 UR6 VLT VCP603H

## (undated) DEVICE — NDL INSUFFLATION 13GA 150MM C2202

## (undated) DEVICE — ESU ENDO SCISSORS 5MM CVD 5DCS

## (undated) DEVICE — ENDO TROCAR FIRST ENTRY KII FIOS Z-THRD 12X100MM CTF73

## (undated) DEVICE — SOL WATER IRRIG 1000ML BOTTLE 07139-09

## (undated) DEVICE — GLOVE BIOGEL PI ULTRATOUCH SZ 8.0 41180

## (undated) DEVICE — ESU PENCIL W/COATED BLADE E2450H

## (undated) DEVICE — SU VICRYL 3-0 SH 27" UND J416H

## (undated) DEVICE — STRAP UNIVERSAL POSITIONING 2-PIECE 4X47X76" 91-287

## (undated) DEVICE — TUBING SUCTION 10'X3/16" N510

## (undated) DEVICE — LINEN TOWEL PACK X6 WHITE 5487

## (undated) DEVICE — DRAPE IOBAN INCISE 23X17" 6650EZ

## (undated) DEVICE — SUCTION IRR STRYKERFLOW II W/TIP 250-070-520

## (undated) DEVICE — ANTIFOG SOLUTION W/FOAM PAD CF-1002

## (undated) DEVICE — Device

## (undated) DEVICE — COVER CAMERA IN-LIGHT DISP LT-C02

## (undated) DEVICE — PREP CHLORAPREP 26ML TINTED HI-LITE ORANGE 930815

## (undated) DEVICE — ENDO TROCAR SLEEVE KII Z-THREADED 05X100MM CTS02

## (undated) DEVICE — DRSG TELFA 3X8" 1238

## (undated) DEVICE — SPECIMEN TRAP MUCOUS 40ML LUKI C30200A

## (undated) DEVICE — SU PDS II 2-0 SH 27" Z317H

## (undated) DEVICE — LINEN TOWEL PACK X30 5481

## (undated) DEVICE — ESU HARMONIC ACE LAP SHEARS STRYKER ACE+ 7 5MMX36CM HARH36

## (undated) DEVICE — ESU GROUND PAD ADULT W/CORD E7507

## (undated) DEVICE — SU PDS II 4-0 FS-2 27" Z422H

## (undated) DEVICE — SURGICEL ABSORBABLE HEMOSTAT SNOW 4"X4" 2083

## (undated) DEVICE — SU DERMABOND ADVANCED .7ML DNX12

## (undated) RX ORDER — ACETAMINOPHEN 325 MG/1
TABLET ORAL
Status: DISPENSED
Start: 2024-08-06

## (undated) RX ORDER — FENTANYL CITRATE 50 UG/ML
INJECTION, SOLUTION INTRAMUSCULAR; INTRAVENOUS
Status: DISPENSED
Start: 2023-04-18

## (undated) RX ORDER — CEFAZOLIN SODIUM/WATER 2 G/20 ML
SYRINGE (ML) INTRAVENOUS
Status: DISPENSED
Start: 2024-08-06

## (undated) RX ORDER — KETOROLAC TROMETHAMINE 30 MG/ML
INJECTION, SOLUTION INTRAMUSCULAR; INTRAVENOUS
Status: DISPENSED
Start: 2024-08-06

## (undated) RX ORDER — SCOLOPAMINE TRANSDERMAL SYSTEM 1 MG/1
PATCH, EXTENDED RELEASE TRANSDERMAL
Status: DISPENSED
Start: 2024-08-06

## (undated) RX ORDER — FENTANYL CITRATE 50 UG/ML
INJECTION, SOLUTION INTRAMUSCULAR; INTRAVENOUS
Status: DISPENSED
Start: 2024-08-06

## (undated) RX ORDER — ONDANSETRON 2 MG/ML
INJECTION INTRAMUSCULAR; INTRAVENOUS
Status: DISPENSED
Start: 2024-08-06

## (undated) RX ORDER — OXYCODONE HYDROCHLORIDE 5 MG/1
TABLET ORAL
Status: DISPENSED
Start: 2024-08-06

## (undated) RX ORDER — DEXAMETHASONE SODIUM PHOSPHATE 4 MG/ML
INJECTION, SOLUTION INTRA-ARTICULAR; INTRALESIONAL; INTRAMUSCULAR; INTRAVENOUS; SOFT TISSUE
Status: DISPENSED
Start: 2024-08-06

## (undated) RX ORDER — FENTANYL CITRATE 50 UG/ML
INJECTION, SOLUTION INTRAMUSCULAR; INTRAVENOUS
Status: DISPENSED
Start: 2025-07-14

## (undated) RX ORDER — METHOCARBAMOL 500 MG/1
TABLET, FILM COATED ORAL
Status: DISPENSED
Start: 2024-08-06

## (undated) RX ORDER — ENOXAPARIN SODIUM 100 MG/ML
INJECTION SUBCUTANEOUS
Status: DISPENSED
Start: 2024-08-06

## (undated) RX ORDER — PROPOFOL 10 MG/ML
INJECTION, EMULSION INTRAVENOUS
Status: DISPENSED
Start: 2024-08-06

## (undated) RX ORDER — HYDROMORPHONE HYDROCHLORIDE 1 MG/ML
INJECTION, SOLUTION INTRAMUSCULAR; INTRAVENOUS; SUBCUTANEOUS
Status: DISPENSED
Start: 2024-08-06

## (undated) RX ORDER — FENTANYL CITRATE-0.9 % NACL/PF 10 MCG/ML
PLASTIC BAG, INJECTION (ML) INTRAVENOUS
Status: DISPENSED
Start: 2024-08-06

## (undated) RX ORDER — BUPIVACAINE HYDROCHLORIDE AND EPINEPHRINE 2.5; 5 MG/ML; UG/ML
INJECTION, SOLUTION EPIDURAL; INFILTRATION; INTRACAUDAL; PERINEURAL
Status: DISPENSED
Start: 2024-08-06

## (undated) RX ORDER — BUPIVACAINE HYDROCHLORIDE 2.5 MG/ML
INJECTION, SOLUTION EPIDURAL; INFILTRATION; INTRACAUDAL
Status: DISPENSED
Start: 2024-08-06

## (undated) RX ORDER — EPHEDRINE SULFATE 50 MG/ML
INJECTION, SOLUTION INTRAMUSCULAR; INTRAVENOUS; SUBCUTANEOUS
Status: DISPENSED
Start: 2024-08-06